# Patient Record
Sex: MALE | Race: WHITE | Employment: UNEMPLOYED | URBAN - METROPOLITAN AREA
[De-identification: names, ages, dates, MRNs, and addresses within clinical notes are randomized per-mention and may not be internally consistent; named-entity substitution may affect disease eponyms.]

---

## 2017-05-31 ENCOUNTER — OFFICE VISIT (OUTPATIENT)
Dept: FAMILY MEDICINE | Facility: CLINIC | Age: 22
End: 2017-05-31
Payer: COMMERCIAL

## 2017-05-31 VITALS
HEART RATE: 82 BPM | TEMPERATURE: 97.9 F | RESPIRATION RATE: 14 BRPM | SYSTOLIC BLOOD PRESSURE: 118 MMHG | HEIGHT: 71 IN | OXYGEN SATURATION: 99 % | DIASTOLIC BLOOD PRESSURE: 76 MMHG | WEIGHT: 208 LBS | BODY MASS INDEX: 29.12 KG/M2

## 2017-05-31 DIAGNOSIS — R53.83 OTHER FATIGUE: Primary | ICD-10-CM

## 2017-05-31 DIAGNOSIS — F41.9 ANXIETY: ICD-10-CM

## 2017-05-31 DIAGNOSIS — L70.8 OTHER ACNE: ICD-10-CM

## 2017-05-31 LAB
BASOPHILS # BLD AUTO: 0 10E9/L (ref 0–0.2)
BASOPHILS NFR BLD AUTO: 0.5 %
DIFFERENTIAL METHOD BLD: NORMAL
EOSINOPHIL # BLD AUTO: 0.2 10E9/L (ref 0–0.7)
EOSINOPHIL NFR BLD AUTO: 2.6 %
ERYTHROCYTE [DISTWIDTH] IN BLOOD BY AUTOMATED COUNT: 13.2 % (ref 10–15)
HCT VFR BLD AUTO: 45.8 % (ref 40–53)
HETEROPH AB SER QL: NEGATIVE
HGB BLD-MCNC: 15.8 G/DL (ref 13.3–17.7)
LYMPHOCYTES # BLD AUTO: 2.2 10E9/L (ref 0.8–5.3)
LYMPHOCYTES NFR BLD AUTO: 28.5 %
MCH RBC QN AUTO: 30.5 PG (ref 26.5–33)
MCHC RBC AUTO-ENTMCNC: 34.5 G/DL (ref 31.5–36.5)
MCV RBC AUTO: 88 FL (ref 78–100)
MONOCYTES # BLD AUTO: 0.7 10E9/L (ref 0–1.3)
MONOCYTES NFR BLD AUTO: 8.7 %
NEUTROPHILS # BLD AUTO: 4.7 10E9/L (ref 1.6–8.3)
NEUTROPHILS NFR BLD AUTO: 59.7 %
PLATELET # BLD AUTO: 232 10E9/L (ref 150–450)
RBC # BLD AUTO: 5.18 10E12/L (ref 4.4–5.9)
WBC # BLD AUTO: 7.8 10E9/L (ref 4–11)

## 2017-05-31 PROCEDURE — 86308 HETEROPHILE ANTIBODY SCREEN: CPT | Performed by: NURSE PRACTITIONER

## 2017-05-31 PROCEDURE — 80050 GENERAL HEALTH PANEL: CPT | Performed by: NURSE PRACTITIONER

## 2017-05-31 PROCEDURE — 99214 OFFICE O/P EST MOD 30 MIN: CPT | Performed by: NURSE PRACTITIONER

## 2017-05-31 PROCEDURE — 82306 VITAMIN D 25 HYDROXY: CPT | Performed by: NURSE PRACTITIONER

## 2017-05-31 PROCEDURE — 36415 COLL VENOUS BLD VENIPUNCTURE: CPT | Performed by: NURSE PRACTITIONER

## 2017-05-31 RX ORDER — BENZOYL PEROXIDE 10 G/100G
GEL TOPICAL AT BEDTIME
Qty: 56 G | Refills: 9 | Status: SHIPPED | OUTPATIENT
Start: 2017-05-31 | End: 2019-03-04

## 2017-05-31 RX ORDER — FLUOXETINE 10 MG/1
CAPSULE ORAL
Qty: 60 CAPSULE | Refills: 1 | Status: SHIPPED | OUTPATIENT
Start: 2017-05-31 | End: 2017-06-05

## 2017-05-31 ASSESSMENT — ANXIETY QUESTIONNAIRES
IF YOU CHECKED OFF ANY PROBLEMS ON THIS QUESTIONNAIRE, HOW DIFFICULT HAVE THESE PROBLEMS MADE IT FOR YOU TO DO YOUR WORK, TAKE CARE OF THINGS AT HOME, OR GET ALONG WITH OTHER PEOPLE: VERY DIFFICULT
5. BEING SO RESTLESS THAT IT IS HARD TO SIT STILL: MORE THAN HALF THE DAYS
3. WORRYING TOO MUCH ABOUT DIFFERENT THINGS: MORE THAN HALF THE DAYS
7. FEELING AFRAID AS IF SOMETHING AWFUL MIGHT HAPPEN: NOT AT ALL
GAD7 TOTAL SCORE: 11
2. NOT BEING ABLE TO STOP OR CONTROL WORRYING: SEVERAL DAYS
1. FEELING NERVOUS, ANXIOUS, OR ON EDGE: SEVERAL DAYS
6. BECOMING EASILY ANNOYED OR IRRITABLE: NEARLY EVERY DAY

## 2017-05-31 ASSESSMENT — PATIENT HEALTH QUESTIONNAIRE - PHQ9: 5. POOR APPETITE OR OVEREATING: MORE THAN HALF THE DAYS

## 2017-05-31 NOTE — MR AVS SNAPSHOT
"              After Visit Summary   2017    Ren Lawton    MRN: [id number]           Patient Information     Date Of Birth          1995        Visit Information        Provider Department      2017 11:00 AM Haase, Susan Rachele, APRN CNP [facility]        Today's Diagnoses     Anxiety    -  1    Other fatigue           Follow-ups after your visit        Follow-up notes from your care team     Return in about 4 weeks (around 2017).      Who to contact     If you have questions or need follow up information about today's clinic visit or your schedule please contact [facility] directly at [phone].  Normal or non-critical lab and imaging results will be communicated to you by MyChart, letter or phone within 4 business days after the clinic has received the results. If you do not hear from us within 7 days, please contact the clinic through MyChart or phone. If you have a critical or abnormal lab result, we will notify you by phone as soon as possible.  Submit refill requests through [employer] or call your pharmacy and they will forward the refill request to us. Please allow 3 business days for your refill to be completed.          Additional Information About Your Visit        MyChart Information     [employer] lets you send messages to your doctor, view your test results, renew your prescriptions, schedule appointments and more. To sign up, go to [email or web address] . Click on \"Log in\" on the left side of the screen, which will take you to the Welcome page. Then click on \"Sign up Now\" on the right side of the page.     You will be asked to enter the access code listed below, as well as some personal information. Please follow the directions to create your username and password.     Your access code is: [email or web address]  Expires: 2017 11:34 AM     Your access code will  in 90 days. If you need help or a new code, please call your [facility] " "or [phone].        Care EveryWhere ID     This is your Care EveryWhere ID. This could be used by other organizations to access your [place] medical records  [id number]        Your Vitals Were     Pulse Temperature Respirations Height Pulse Oximetry BMI (Body Mass Index)    82 97.9  F (36.6  C) (Oral) 14 5' 11\" (1.803 m) 99% 29.01 kg/m2       Blood Pressure from Last 3 Encounters:   05/31/17 118/76   04/03/16 (!) 153/92   12/10/15 127/80    Weight from Last 3 Encounters:   05/31/17 208 lb (94.3 kg) (95 %)*   04/03/16 230 lb (104.3 kg) (98 %)*   12/07/15 226 lb (102.5 kg) (98 %)*     * Growth percentiles are based on [facility] 2-20 Years data.              We Performed the Following     CBC with platelets differential     Comprehensive metabolic panel     Mononucleosis screen     TSH with free T4 reflex     Vitamin D Deficiency          Today's Medication Changes          These changes are accurate as of: 5/31/17 11:34 AM.  If you have any questions, ask your nurse or doctor.               Start taking these medicines.        Dose/Directions    FLUoxetine 10 MG capsule   Commonly known as:  PROzac   Used for:  Anxiety   Started by:  Haase, Susan Rachele, APRN CNP        Take 1 tablet every day for 2 weeks, after 2 weeks increase to 20 mg.   Quantity:  60 capsule   Refills:  1            Where to get your medicines      These medications were sent to [facility] [address] [place] [address] [facility]  [address] [place] [address]     Phone:  [phone]     FLUoxetine 10 MG capsule                Primary Care Provider Office Phone # Fax #    Susan Rachele Haase, APRN -[phone]       [facility] [address]  [facility] [address]        Thank you!     Thank you for choosing [facility]  for your care. Our goal is always to provide you with excellent care. Hearing back from our patients is one way we can continue to improve our " services. Please take a few minutes to complete the written survey that you may receive in the mail after your visit with us. Thank you!             Your Updated Medication List - Protect others around you: Learn how to safely use, store and throw away your medicines at www.disposemymeds.org.          This list is accurate as of: 5/31/17 11:34 AM.  Always use your most recent med list.                   Brand Name Dispense Instructions for use    benzoyl peroxide 10 % topical gel     56 g    Apply topically At Bedtime       clindamycin 1 % topical gel    CLINDAMAX    60 g    Apply topically 2 times daily       doxycycline 100 MG capsule    VIBRAMYCIN    60 capsule    Take 1 capsule (100 mg) by mouth 2 times daily       erythromycin ophthalmic ointment    ROMYCIN    1 Tube    Place 1 Application into the right eye 3 times daily       FLUoxetine 10 MG capsule    PROzac    60 capsule    Take 1 tablet every day for 2 weeks, after 2 weeks increase to 20 mg.

## 2017-05-31 NOTE — PROGRESS NOTES
"  SUBJECTIVE:                                                    Ren Lawton is a 21 year old male who presents to clinic today for the following health issues:    Fatigue    Duration: 7 months but worse for past 3    Description (location/character/radiation): always tired     Intensity:  severe    Accompanying signs and symptoms: pressure in head and allergies    History (similar episodes/previous evaluation): None    Precipitating or alleviating factors: None    Therapies tried and outcome: ibuprofen    Ren reports fatigue for the past several months but worsening for the past 3 months. States that he is tired all the time. Feels more \"mentally fatigued\" like he \"can't turn his brain off\" rather than physically fatigued. He is getting and average of 5-6 hours of sleep per night. Reports difficulty falling asleep and does not feel rested when he wakes up. Has associated pressure in head, sometimes feels sad but believes this is normal. Has been taking sudafed for allergies. Denies headaches, sneezing, or sore throat. Of note, patient eats about 1 meal per day at home, does not have a very good appetite. Spends most of his time working at [employer]. Not currently in school. Spending free time at home or with friends. Has a few drinks per week and smoking marijuana on the weekends but denies daily substance use.    Ren's mother reports that he has been more agitated/irritable than usual and seems down. Thinks symptoms may be exacerbated by allergies. Also going through difficult transition from graduating high school to working and putting more schooling on hold. He originally moved out but is not living at home again. Denies history of depression or anxiety, denies thoughts of harming self or others.    PHQ-9: 13  SUDHEER-7: 11   Acne:  Requests refill of benzoyl peroxide,well controlled.   Problem list and histories reviewed & adjusted, as indicated.  Additional history: as documented    Patient " "Active Problem List   Diagnosis     GERD (gastroesophageal reflux disease)     Intervertebral disc protrusion     Lumbar foraminal stenosis     Obesity     History reviewed. No pertinent surgical history.    Social History   Substance Use Topics     Smoking status: Never Smoker     Smokeless tobacco: Never Used     Alcohol use No     Family History   Problem Relation Age of Onset     DIABETES Mother      gestational diabetes     DIABETES Paternal Grandmother          Current Outpatient Prescriptions   Medication Sig Dispense Refill     doxycycline (VIBRAMYCIN) 100 MG capsule Take 1 capsule (100 mg) by mouth 2 times daily (Patient not taking: Reported on 5/31/2017) 60 capsule 3     erythromycin (ROMYCIN) ophthalmic ointment Place 1 Application into the right eye 3 times daily (Patient not taking: Reported on 5/31/2017) 1 Tube 0     clindamycin (CLINDAMAX) 1 % gel Apply topically 2 times daily (Patient not taking: Reported on 5/31/2017) 60 g 11     benzoyl peroxide 10 % gel Apply topically At Bedtime (Patient not taking: Reported on 5/31/2017) 56 g 9     Allergies   Allergen Reactions     Cats      Reviewed and updated as needed this visit by clinical staff  Reviewed and updated as needed this visit by Provider    ROS:  Constitutional, HEENT, cardiovascular, pulmonary, GI, , musculoskeletal, neuro, skin, endocrine and psych systems are negative, except as otherwise noted.    This document serves as a record of the services and decisions personally performed and made by Susan Haase, CNP. It was created on her behalf by Candis Back, a trained medical scribe. The creation of this document is based the provider's statements to the medical scribe.  Candis Back May 31, 2017 11:15 AM   OBJECTIVE:                                                    /76 (BP Location: Left arm, Patient Position: Chair, Cuff Size: Adult Large)  Pulse 82  Temp 97.9  F (36.6  C) (Oral)  Resp 14  Ht 1.803 m (5' 11\")  Wt 94.3 " kg (208 lb)  SpO2 99%  BMI 29.01 kg/m2  Body mass index is 29.01 kg/(m^2).  GENERAL: healthy, alert and no distress, overweight  HENT: ear canals and TM's normal, nose and mouth without ulcers or lesions  NECK: no adenopathy, no asymmetry, masses, or scars and thyroid normal to palpation  RESP: lungs clear to auscultation - no rales, rhonchi or wheezes  CV: regular rate and rhythm, normal S1 S2, no S3 or S4, no murmur, click or rub, no peripheral edema   ABDOMEN: soft, nontender, no hepatosplenomegaly, no masses and bowel sounds normal  NEURO: Normal strength and tone, mentation intact and speech normal  PSYCH: mentation appears normal, affect normal    Diagnostic Test Results:  none      ASSESSMENT/PLAN:                                                    Ren was seen today for fatigue.    Diagnoses and all orders for this visit:    Other fatigue: will obtain the following labs due to symptoms of fatigue,  -     CBC with platelets differential  -     Comprehensive metabolic panel  -     TSH with free T4 reflex  -     Vitamin D Deficiency  -     Mononucleosis screen    Anxiety  -     FLUoxetine (PROZAC) 10 MG capsule; Take 1 tablet every day for 2 weeks, after 2 weeks increase to 20 mg.  Discussed risks and benefits of medication, need to take on a daily basis, will take at least 2-4 weeks to notice decrease in depression or anxiety, if symptoms of depression worsen stop taking medication and notify the clinic.  Is aware of emergency resources.  Encouraged increased in exercise and counseling.   Other acne  -     benzoyl peroxide 10 % topical gel; Apply topically At Bedtime      Follow up visit in 4-6 weeks, sooner as needed.    There are no Patient Instructions on file for this visit.    The information in this document, created by the medical scribe for me, accurately reflects the services I personally performed and the decisions made by me. I have reviewed and approved this document for accuracy.   Apurva  Haase, APRN [facility]

## 2017-05-31 NOTE — LETTER
[facility]  [address] [place], [place], [id number] [phone]        June 2, 2017      Ren Lawton  [address] [facility] [address]        [place] lab results are as below:   1)  TSH (thyroid level) 0.68 which is normal (range 0.4-4)   2)  CBC (checks for anemia and infection) was normal.   3)  Glucose was normal at 88 (normal fasting is <100).   4)  Mono test was negative.   5)  Vitamin D level was normal at 28.     Results for orders placed or performed in visit on 05/31/17   CBC with platelets differential   Result Value Ref Range    WBC 7.8 4.0 - 11.0 10e9/L    RBC Count 5.18 4.4 - 5.9 10e12/L    Hemoglobin 15.8 13.3 - 17.7 g/dL    Hematocrit 45.8 40.0 - 53.0 %    MCV 88 78 - 100 fl    MCH 30.5 26.5 - 33.0 pg    MCHC 34.5 31.5 - 36.5 g/dL    RDW 13.2 10.0 - 15.0 %    Platelet Count 232 150 - 450 10e9/L    Diff Method Automated Method     % Neutrophils 59.7 %    % Lymphocytes 28.5 %    % Monocytes 8.7 %    % Eosinophils 2.6 %    % Basophils 0.5 %    Absolute Neutrophil 4.7 1.6 - 8.3 10e9/L    Absolute Lymphocytes 2.2 0.8 - 5.3 10e9/L    Absolute Monocytes 0.7 0.0 - 1.3 10e9/L    Absolute Eosinophils 0.2 0.0 - 0.7 10e9/L    Absolute Basophils 0.0 0.0 - 0.2 10e9/L   Comprehensive metabolic panel   Result Value Ref Range    Sodium 141 133 - 144 mmol/L    Potassium 4.5 3.4 - 5.3 mmol/L    Chloride 106 98 - 110 mmol/L    Carbon Dioxide 25 20 - 32 mmol/L    Anion Gap 10 3 - 14 mmol/L    Glucose 88 70 - 99 mg/dL    Urea Nitrogen 19 7 - 30 mg/dL    Creatinine 1.04 (H) 0.50 - 1.00 mg/dL    GFR Estimate >90  Non  GFR Calc   >60 mL/min/1.7m2    GFR Estimate If Black >90   GFR Calc   >60 mL/min/1.7m2    Calcium 9.4 8.5 - 10.1 mg/dL    Bilirubin Total 0.6 0.2 - 1.3 mg/dL    Albumin 4.6 3.4 - 5.0 g/dL    Protein Total 8.2 6.8 - 8.8 g/dL    Alkaline Phosphatase 92 65 - 260 U/L    ALT 57 (H) 0 - 50 U/L    AST 48 (H) 0 - 35 U/L   TSH with  free T4 reflex   Result Value Ref Range    TSH 0.68 0.40 - 4.00 mU/L   Vitamin D Deficiency   Result Value Ref Range    Vitamin D Deficiency screening 28 20 - 75 ug/L   Mononucleosis screen   Result Value Ref Range    Mononucleosis Screen Negative NEG     If you have any questions do not hesitate to call the clinic to discuss the results with me further.     Sincerely,     Susan Haase, CNP

## 2017-05-31 NOTE — NURSING NOTE
"Chief Complaint   Patient presents with     Fatigue       Initial /76 (BP Location: Left arm, Patient Position: Chair, Cuff Size: Adult Large)  Pulse 82  Temp 97.9  F (36.6  C) (Oral)  Resp 14  Ht 5' 11\" (1.803 m)  Wt 208 lb (94.3 kg)  SpO2 99%  BMI 29.01 kg/m2 Estimated body mass index is 29.01 kg/(m^2) as calculated from the following:    Height as of this encounter: 5' 11\" (1.803 m).    Weight as of this encounter: 208 lb (94.3 kg).  Medication Reconciliation: complete   Kira Kruger CMA      "

## 2017-06-01 LAB
ALBUMIN SERPL-MCNC: 4.6 G/DL (ref 3.4–5)
ALP SERPL-CCNC: 92 U/L (ref 65–260)
ALT SERPL W P-5'-P-CCNC: 57 U/L (ref 0–50)
ANION GAP SERPL CALCULATED.3IONS-SCNC: 10 MMOL/L (ref 3–14)
AST SERPL W P-5'-P-CCNC: 48 U/L (ref 0–35)
BILIRUB SERPL-MCNC: 0.6 MG/DL (ref 0.2–1.3)
BUN SERPL-MCNC: 19 MG/DL (ref 7–30)
CALCIUM SERPL-MCNC: 9.4 MG/DL (ref 8.5–10.1)
CHLORIDE SERPL-SCNC: 106 MMOL/L (ref 98–110)
CO2 SERPL-SCNC: 25 MMOL/L (ref 20–32)
CREAT SERPL-MCNC: 1.04 MG/DL (ref 0.5–1)
DEPRECATED CALCIDIOL+CALCIFEROL SERPL-MC: 28 UG/L (ref 20–75)
GFR SERPL CREATININE-BSD FRML MDRD: ABNORMAL ML/MIN/1.7M2
GLUCOSE SERPL-MCNC: 88 MG/DL (ref 70–99)
POTASSIUM SERPL-SCNC: 4.5 MMOL/L (ref 3.4–5.3)
PROT SERPL-MCNC: 8.2 G/DL (ref 6.8–8.8)
SODIUM SERPL-SCNC: 141 MMOL/L (ref 133–144)
TSH SERPL DL<=0.005 MIU/L-ACNC: 0.68 MU/L (ref 0.4–4)

## 2017-06-01 ASSESSMENT — PATIENT HEALTH QUESTIONNAIRE - PHQ9: SUM OF ALL RESPONSES TO PHQ QUESTIONS 1-9: 13

## 2017-06-01 ASSESSMENT — ANXIETY QUESTIONNAIRES: GAD7 TOTAL SCORE: 11

## 2017-06-05 ENCOUNTER — TELEPHONE (OUTPATIENT)
Dept: FAMILY MEDICINE | Facility: CLINIC | Age: 22
End: 2017-06-05

## 2017-06-05 DIAGNOSIS — F41.9 ANXIETY: ICD-10-CM

## 2017-06-05 RX ORDER — FLUOXETINE 10 MG/1
CAPSULE ORAL
Qty: 14 CAPSULE | Refills: 0 | Status: SHIPPED | OUTPATIENT
Start: 2017-06-05 | End: 2017-06-27 | Stop reason: ALTCHOICE

## 2017-06-05 NOTE — TELEPHONE ENCOUNTER
Fax from [facility], insurance over covers fluoxetine only 1 per day, resent for 2 prescriptions  Prescription approved per [facility] Refill Protocol.  Rolanda Tripp, RN, BSN

## 2017-06-27 ENCOUNTER — TELEPHONE (OUTPATIENT)
Dept: FAMILY MEDICINE | Facility: CLINIC | Age: 22
End: 2017-06-27

## 2017-06-27 ENCOUNTER — VIRTUAL VISIT (OUTPATIENT)
Dept: FAMILY MEDICINE | Facility: CLINIC | Age: 22
End: 2017-06-27
Payer: COMMERCIAL

## 2017-06-27 DIAGNOSIS — F41.9 ANXIETY: Primary | ICD-10-CM

## 2017-06-27 PROCEDURE — 98966 PH1 ASSMT&MGMT NQHP 5-10: CPT | Performed by: NURSE PRACTITIONER

## 2017-06-27 RX ORDER — BUPROPION HYDROCHLORIDE 150 MG/1
150 TABLET ORAL EVERY MORNING
Qty: 30 TABLET | Refills: 1 | Status: SHIPPED | OUTPATIENT
Start: 2017-06-27 | End: 2017-07-18

## 2017-06-27 ASSESSMENT — ANXIETY QUESTIONNAIRES
2. NOT BEING ABLE TO STOP OR CONTROL WORRYING: SEVERAL DAYS
5. BEING SO RESTLESS THAT IT IS HARD TO SIT STILL: NEARLY EVERY DAY
6. BECOMING EASILY ANNOYED OR IRRITABLE: NEARLY EVERY DAY
GAD7 TOTAL SCORE: 9
3. WORRYING TOO MUCH ABOUT DIFFERENT THINGS: SEVERAL DAYS
IF YOU CHECKED OFF ANY PROBLEMS ON THIS QUESTIONNAIRE, HOW DIFFICULT HAVE THESE PROBLEMS MADE IT FOR YOU TO DO YOUR WORK, TAKE CARE OF THINGS AT HOME, OR GET ALONG WITH OTHER PEOPLE: VERY DIFFICULT
7. FEELING AFRAID AS IF SOMETHING AWFUL MIGHT HAPPEN: NOT AT ALL
1. FEELING NERVOUS, ANXIOUS, OR ON EDGE: SEVERAL DAYS

## 2017-06-27 ASSESSMENT — PATIENT HEALTH QUESTIONNAIRE - PHQ9: 5. POOR APPETITE OR OVEREATING: NOT AT ALL

## 2017-06-27 NOTE — MR AVS SNAPSHOT
After Visit Summary   6/27/2017    Ren Lawton    MRN: [id number]           Patient Information     Date Of Birth          1995        Visit Information        Provider Department      6/27/2017 11:15 AM Haase, Susan Rachele, APRN CNP [facility]        Today's Diagnoses     Anxiety    -  1       Follow-ups after your visit        Additional Services     MENTAL HEALTH REFERRAL       Your provider has referred you to: [facility] [phone] for ADD testing    All scheduling is subject to the client's specific insurance plan & benefits, provider/location availability, and provider clinical specialities.  Please arrive 15 minutes early for your first appointment and bring your completed paperwork.    Please be aware that coverage of these services is subject to the terms and limitations of your health insurance plan.  Call member services at your health plan with any benefit or coverage questions.                  Follow-up notes from your care team     Return in about 2 months (around 8/27/2017).      Who to contact     If you have questions or need follow up information about today's clinic visit or your schedule please contact [facility] directly at [phone].  Normal or non-critical lab and imaging results will be communicated to you by [employer], letter or phone within 4 business days after the clinic has received the results. If you do not hear from us within 7 days, please contact the clinic through [employer] or phone. If you have a critical or abnormal lab result, we will notify you by phone as soon as possible.  Submit refill requests through [employer] or call your pharmacy and they will forward the refill request to us. Please allow 3 business days for your refill to be completed.          Additional Information About Your Visit        [employer] Information     [employer] lets you send messages to your doctor, view your test results,  "renew your prescriptions, schedule appointments and more. To sign up, go to [email or web address] . Click on \"Log in\" on the left side of the screen, which will take you to the Welcome page. Then click on \"Sign up Now\" on the right side of the page.     You will be asked to enter the access code listed below, as well as some personal information. Please follow the directions to create your username and password.     Your access code is: [email or web address]  Expires: 2017 11:34 AM     Your access code will  in 90 days. If you need help or a new code, please call your [place] clinic or [phone].        Care EveryWhere ID     This is your Care EveryWhere ID. This could be used by other organizations to access your [place] medical records  [id number]         Blood Pressure from Last 3 Encounters:   17 118/76   16 (!) 153/92   12/10/15 127/80    Weight from Last 3 Encounters:   17 208 lb (94.3 kg) (95 %)*   16 230 lb (104.3 kg) (98 %)*   12/07/15 226 lb (102.5 kg) (98 %)*     * Growth percentiles are based on [facility] 2-20 Years data.              We Performed the Following     MENTAL HEALTH REFERRAL          Today's Medication Changes          These changes are accurate as of: 17 12:33 PM.  If you have any questions, ask your nurse or doctor.               Start taking these medicines.        Dose/Directions    buPROPion 150 MG 24 hr tablet   Commonly known as:  WELLBUTRIN XL   Used for:  Anxiety   Started by:  Haase, Susan Rachele, APRN CNP        Dose:  150 mg   Take 1 tablet (150 mg) by mouth every morning   Quantity:  30 tablet   Refills:  1         Stop taking these medicines if you haven't already. Please contact your care team if you have questions.     FLUoxetine 10 MG capsule   Commonly known as:  PROzac   Stopped by:  Haase, Susan Rachele, APRN CNP           FLUoxetine 20 MG capsule   Commonly known as:  PROzac   Stopped by:  Haase, Susan Rachele, APRN CNP              "   Where to get your medicines      These medications were sent to [facility] - [place], [place] - [address] [facility]  [address] [facility] [address]     Phone:  [phone]     buPROPion 150 MG 24 hr tablet                Primary Care Provider Office Phone # Fax #    Susan Rachele Haase, APRN -[phone]       [facility] [address]  [facility] [address]        Equal Access to Services     KLARISSA HALE : Hadii aad ku hadasho Soomaali, waaxda luqadaha, qaybta kaalmada adeegyada, waxay idiin hayaan adeeg kharash lamarlyn . So [facility] [phone].    ATENCIÓN: Si habla español, tiene a foley disposición servicios gratuitos de asistencia lingüística. [facility] [phone].    We comply with applicable federal civil rights laws and [place] laws. We do not discriminate on the basis of race, color, national origin, age, disability sex, sexual orientation or gender identity.            Thank you!     Thank you for choosing [facility]  for your care. Our goal is always to provide you with excellent care. Hearing back from our patients is one way we can continue to improve our services. Please take a few minutes to complete the written survey that you may receive in the mail after your visit with us. Thank you!             Your Updated Medication List - Protect others around you: Learn how to safely use, store and throw away your medicines at www.disposemymeds.org.          This list is accurate as of: 6/27/17 12:33 PM.  Always use your most recent med list.                   Brand Name Dispense Instructions for use Diagnosis    benzoyl peroxide 10 % topical gel     56 g    Apply topically At Bedtime    Other acne       buPROPion 150 MG 24 hr tablet    WELLBUTRIN XL    30 tablet    Take 1 tablet (150 mg) by mouth every morning    Anxiety       clindamycin 1 % topical gel    CLINDAMAX    60 g    Apply topically 2 times daily    Other acne        doxycycline 100 MG capsule    VIBRAMYCIN    60 capsule    Take 1 capsule (100 mg) by mouth 2 times daily    Other acne       erythromycin ophthalmic ointment    ROMYCIN    1 Tube    Place 1 Application into the right eye 3 times daily

## 2017-06-27 NOTE — TELEPHONE ENCOUNTER
Mom calls for patient. CTC on file.   Pt is confused whether he is to discontinue Prozac without a taper?  Is he to start Wellbutrin?  Or hold all meds until eval for ADD?  He recalled Apurva told him not to  Wellbutrin but mom just received call from pharmacy Rx is ready.    Ramior Brooks RN

## 2017-06-27 NOTE — PROGRESS NOTES
SUBJECTIVE:                                                    Ren Lawton is a 21 year old male who presents to clinic today for the following health issues:    Anxiety Follow-Up    Status since last visit: No change    Other associated symptoms:None    Complicating factors:   Significant life event: No   Current substance abuse: None  Depression symptoms: No  SUDHEER-7 SCORE 5/31/2017 6/27/2017   Total Score 11 9       GAD7            Amount of exercise or physical activity: 3-4 days/week for an average of greater than 60 minutes    Problems taking medications regularly: No    Medication side effects: none    Diet: regular (no restrictions)  Has been taking prozac 20 mg for the past 2 months, has not had any change in depression or anxiety.  Is concerned that he may have ADD since anxious with difficulty concentrating. Has had this issues since childhood, has never been treated for ADD.    Problem list and histories reviewed & adjusted, as indicated.  Additional history: as documented    Patient Active Problem List   Diagnosis     GERD (gastroesophageal reflux disease)     Intervertebral disc protrusion     Lumbar foraminal stenosis     Obesity     Anxiety     History reviewed. No pertinent surgical history.    Social History   Substance Use Topics     Smoking status: Never Smoker     Smokeless tobacco: Never Used     Alcohol use No     Family History   Problem Relation Age of Onset     DIABETES Mother      gestational diabetes     DIABETES Paternal Grandmother          Current Outpatient Prescriptions   Medication Sig Dispense Refill     FLUoxetine (PROZAC) 20 MG capsule Take 1 capsule (20 mg) by mouth daily 30 capsule 1     benzoyl peroxide 10 % topical gel Apply topically At Bedtime 56 g 9     FLUoxetine (PROZAC) 10 MG capsule Take 1 tablet every day for 2 weeks, than change to 20 mg rx (Patient not taking: Reported on 6/27/2017) 14 capsule 0     doxycycline (VIBRAMYCIN) 100 MG capsule Take 1 capsule (100  mg) by mouth 2 times daily (Patient not taking: Reported on 5/31/2017) 60 capsule 3     erythromycin (ROMYCIN) ophthalmic ointment Place 1 Application into the right eye 3 times daily (Patient not taking: Reported on 5/31/2017) 1 Tube 0     clindamycin (CLINDAMAX) 1 % gel Apply topically 2 times daily (Patient not taking: Reported on 5/31/2017) 60 g 11     Allergies   Allergen Reactions     Cats        Reviewed and updated as needed this visit by clinical staff  Tobacco  Allergies  Med Hx  Surg Hx  Fam Hx  Soc Hx      Reviewed and updated as needed this visit by Provider         ROS:  C: NEGATIVE for fever, chills, change in weight  R: NEGATIVE for significant cough or SOB  CV: NEGATIVE for chest pain, palpitations or peripheral edema  PSYCHIATRIC: NEGATIVE for changes in mood or affect    OBJECTIVE:      Phone visit    ASSESSMENT:       PLAN:   Ren was seen today for recheck medication.    Diagnoses and all orders for this visit:    Anxiety  -     buPROPion (WELLBUTRIN XL) 150 MG 24 hr tablet; Take 1 tablet (150 mg) by mouth every morning  -     MENTAL HEALTH REFERRAL    Discussed evaluation for ADD with [facility], after testing please bring a copy of the assessment for my evaluation, if indicated medication will be prescribed.  Follow up in 2-3 months, sooner as needed.  Phone visit lasted 10 minutes.  Susan Haase, APRN [facility]

## 2017-06-28 ASSESSMENT — PATIENT HEALTH QUESTIONNAIRE - PHQ9: SUM OF ALL RESPONSES TO PHQ QUESTIONS 1-9: 13

## 2017-06-28 ASSESSMENT — ANXIETY QUESTIONNAIRES: GAD7 TOTAL SCORE: 9

## 2017-06-28 NOTE — TELEPHONE ENCOUNTER
Spoke with Magy (mother), discussed the he may discontinue prozac at this time. Refer to [facility] for ADD assessment.  Has an appt for next week.  Instructed to only start wellbutrin if not able to get in for evaluation.  Susan Haase, CNP

## 2017-07-17 ENCOUNTER — TELEPHONE (OUTPATIENT)
Dept: FAMILY MEDICINE | Facility: CLINIC | Age: 22
End: 2017-07-17

## 2017-07-17 DIAGNOSIS — F41.9 ANXIETY: ICD-10-CM

## 2017-07-17 NOTE — TELEPHONE ENCOUNTER
Mom calls, CTC on file, calls with update to , ok for 7/18, pt canceled psych appointment due to cost, does not have insurance, see recent notes, pt has Improved energy but  Focus still an issue, wonders about next step, wants  input, routed, inform mom of plan  Rolanda Tripp RN, BSN  Message handled by Nurse Triage.

## 2017-07-18 RX ORDER — BUPROPION HYDROCHLORIDE 300 MG/1
300 TABLET ORAL EVERY MORNING
Qty: 30 TABLET | Refills: 1 | Status: SHIPPED | OUTPATIENT
Start: 2017-07-18 | End: 2017-08-08 | Stop reason: DRUGHIGH

## 2017-07-18 NOTE — TELEPHONE ENCOUNTER
I would suggest an office visit or phone visit with both mom and delilah on the line, at that time we can discuss possibility of starting on Wellbutrin.  Thanks,  Susan Haase, CNP

## 2017-07-18 NOTE — TELEPHONE ENCOUNTER
Called mom, pt started on wellbutrin  mg on  6/27, prozac was discontinued, did have increased energy but focus still an issue, wonders if can increase dose? SH please confirm plan and if you still need phone visit, route again for processing  Rolanda Tripp RN, BSN  Message handled by Nurse Triage.

## 2017-07-19 NOTE — TELEPHONE ENCOUNTER
Called mom, informed, agrees, f/u appointment made  Rolanda Tripp RN, BSN  Message handled by Nurse Triage.

## 2017-07-19 NOTE — TELEPHONE ENCOUNTER
I would suggest increasing to wellbutrin  mg, a new prescription has liza sent. I will need to see him for follow up in 1 month.  Thanks,  Susan Haase, CNP

## 2017-08-08 ENCOUNTER — OFFICE VISIT (OUTPATIENT)
Dept: FAMILY MEDICINE | Facility: CLINIC | Age: 22
End: 2017-08-08
Payer: COMMERCIAL

## 2017-08-08 VITALS
OXYGEN SATURATION: 100 % | HEART RATE: 68 BPM | SYSTOLIC BLOOD PRESSURE: 152 MMHG | WEIGHT: 215 LBS | DIASTOLIC BLOOD PRESSURE: 80 MMHG | BODY MASS INDEX: 29.99 KG/M2 | RESPIRATION RATE: 14 BRPM | TEMPERATURE: 98.1 F

## 2017-08-08 DIAGNOSIS — F41.9 ANXIETY: Primary | ICD-10-CM

## 2017-08-08 PROCEDURE — 99213 OFFICE O/P EST LOW 20 MIN: CPT | Performed by: NURSE PRACTITIONER

## 2017-08-08 RX ORDER — BUPROPION HYDROCHLORIDE 150 MG/1
150 TABLET ORAL EVERY MORNING
Qty: 30 TABLET | Refills: 1 | Status: SHIPPED | OUTPATIENT
Start: 2017-08-08 | End: 2017-09-08

## 2017-08-08 ASSESSMENT — ANXIETY QUESTIONNAIRES
2. NOT BEING ABLE TO STOP OR CONTROL WORRYING: SEVERAL DAYS
GAD7 TOTAL SCORE: 10
3. WORRYING TOO MUCH ABOUT DIFFERENT THINGS: NEARLY EVERY DAY
1. FEELING NERVOUS, ANXIOUS, OR ON EDGE: MORE THAN HALF THE DAYS
6. BECOMING EASILY ANNOYED OR IRRITABLE: MORE THAN HALF THE DAYS
5. BEING SO RESTLESS THAT IT IS HARD TO SIT STILL: SEVERAL DAYS
7. FEELING AFRAID AS IF SOMETHING AWFUL MIGHT HAPPEN: NOT AT ALL

## 2017-08-08 ASSESSMENT — PATIENT HEALTH QUESTIONNAIRE - PHQ9
SUM OF ALL RESPONSES TO PHQ QUESTIONS 1-9: 13
5. POOR APPETITE OR OVEREATING: SEVERAL DAYS

## 2017-08-08 NOTE — PATIENT INSTRUCTIONS
Follow up with Neuropsychology regarding ADD testing, as discussed    Consider [facility] genetic testing to find potential effective medications    Continue with Wellbutrin for 2-4 weeks - if not improvement, decrease to 150 mg daily at next refill

## 2017-08-08 NOTE — PROGRESS NOTES
SUBJECTIVE:                                                    Ren Lawton is a 21 year old male who presents to clinic today with his mother for the following health issues:    Anxiety Follow-Up    Status since last visit: Worsened     Other associated symptoms:None    Complicating factors:   Significant life event: No   Current substance abuse: None  Depression symptoms: No  SUDHEER-7 SCORE 5/31/2017 6/27/2017   Total Score 11 9     Wellbutrin  mg daily. He reported feeling worse, and that he is having significant trouble focusing. He has tried other medications, which also haven't helped.     He reported intermittent nights where he wakes up lots of times - 3-4 days/week. Average of 5-9 hours of sleep and fatigued during the day.     He reported days of overeating and days of under-eating.     PHQ-9: 13, denies thoughts of harming self or others.  SUDHEER-7: 10    Amount of exercise or physical activity: 2-3 days/week for an average of 30-45 minutes    Problems taking medications regularly: No    Medication side effects: none    Diet: regular (no restrictions)    Problem list and histories reviewed & adjusted, as indicated.  Additional history: as documented    Reviewed and updated as needed this visit by clinical staffTobacco  Allergies  Med Hx  Surg Hx  Fam Hx  Soc Hx      Reviewed and updated as needed this visit by Provider         ROS:  Constitutional, HEENT, cardiovascular, pulmonary, GI, , musculoskeletal, neuro, skin, endocrine and psych systems are negative, except as otherwise noted.    This document serves as a record of the services and decisions personally performed and made by Susan Haase, CNP. It was created on her behalf by Gildardo Valle, a trained medical scribe. The creation of this document is based the provider's statements to the medical scribe.  Gildardo Valle August 8, 2017 11:55 AM    OBJECTIVE:   /80 (BP Location: Left arm, Patient Position: Chair, Cuff Size: Adult Large)   Pulse 68  Temp 98.1  F (36.7  C) (Oral)  Resp 14  Wt 97.5 kg (215 lb)  SpO2 100%  BMI 29.99 kg/m2  Body mass index is 29.99 kg/(m^2).  GENERAL: healthy, alert and no distress, overweight  NEURO: mentation intact and speech normal  PSYCH: mentation appears normal, affect flat/agitated, tearful    Diagnostic Test Results:  Results for orders placed or performed in visit on 05/31/17   CBC with platelets differential   Result Value Ref Range    WBC 7.8 4.0 - 11.0 10e9/L    RBC Count 5.18 4.4 - 5.9 10e12/L    Hemoglobin 15.8 13.3 - 17.7 g/dL    Hematocrit 45.8 40.0 - 53.0 %    MCV 88 78 - 100 fl    MCH 30.5 26.5 - 33.0 pg    MCHC 34.5 31.5 - 36.5 g/dL    RDW 13.2 10.0 - 15.0 %    Platelet Count 232 150 - 450 10e9/L    Diff Method Automated Method     % Neutrophils 59.7 %    % Lymphocytes 28.5 %    % Monocytes 8.7 %    % Eosinophils 2.6 %    % Basophils 0.5 %    Absolute Neutrophil 4.7 1.6 - 8.3 10e9/L    Absolute Lymphocytes 2.2 0.8 - 5.3 10e9/L    Absolute Monocytes 0.7 0.0 - 1.3 10e9/L    Absolute Eosinophils 0.2 0.0 - 0.7 10e9/L    Absolute Basophils 0.0 0.0 - 0.2 10e9/L   Comprehensive metabolic panel   Result Value Ref Range    Sodium 141 133 - 144 mmol/L    Potassium 4.5 3.4 - 5.3 mmol/L    Chloride 106 98 - 110 mmol/L    Carbon Dioxide 25 20 - 32 mmol/L    Anion Gap 10 3 - 14 mmol/L    Glucose 88 70 - 99 mg/dL    Urea Nitrogen 19 7 - 30 mg/dL    Creatinine 1.04 (H) 0.50 - 1.00 mg/dL    GFR Estimate >90  Non  GFR Calc   >60 mL/min/1.7m2    GFR Estimate If Black >90   GFR Calc   >60 mL/min/1.7m2    Calcium 9.4 8.5 - 10.1 mg/dL    Bilirubin Total 0.6 0.2 - 1.3 mg/dL    Albumin 4.6 3.4 - 5.0 g/dL    Protein Total 8.2 6.8 - 8.8 g/dL    Alkaline Phosphatase 92 65 - 260 U/L    ALT 57 (H) 0 - 50 U/L    AST 48 (H) 0 - 35 U/L   TSH with free T4 reflex   Result Value Ref Range    TSH 0.68 0.40 - 4.00 mU/L   Vitamin D Deficiency   Result Value Ref Range    Vitamin D Deficiency screening 28  20 - 75 ug/L   Mononucleosis screen   Result Value Ref Range    Mononucleosis Screen Negative NEG     ASSESSMENT/PLAN:   Ren was seen today for recheck medication.    Diagnoses and all orders for this visit:    Anxiety  -     buPROPion (WELLBUTRIN XL) 150 MG 24 hr tablet; Take 1 tablet (150 mg) by mouth every morning  Encouraged further assess ment for ADD since focus seems to be the main issue, once he has received test results instructed to bring a copy to the clinic and to set up a phone visit to discuss medication options if recommended.     Patient Instructions   Follow up with Neuropsychology regarding ADD testing, as discussed    Consider [facility] genetic testing to find potential effective medications    Continue with Wellbutrin for 2-4 weeks - if not improvement, decrease to 150 mg daily at next refill    The information in this document, created by the medical scribe for me, accurately reflects the services I personally performed and the decisions made by me. I have reviewed and approved this document for accuracy.   Susan Haase, CNP Susan Haase, APRN CNP  [facility]

## 2017-08-08 NOTE — NURSING NOTE
"Chief Complaint   Patient presents with     Recheck Medication       Initial /80 (BP Location: Left arm, Patient Position: Chair, Cuff Size: Adult Large)  Pulse 68  Temp 98.1  F (36.7  C) (Oral)  Resp 14  Wt 215 lb (97.5 kg)  SpO2 100%  BMI 29.99 kg/m2 Estimated body mass index is 29.99 kg/(m^2) as calculated from the following:    Height as of 5/31/17: 5' 11\" (1.803 m).    Weight as of this encounter: 215 lb (97.5 kg).  Medication Reconciliation: complete   Kira Kruger CMA      "

## 2017-08-08 NOTE — MR AVS SNAPSHOT
"              After Visit Summary   8/8/2017    Ren Lawton    MRN: [id number]           Patient Information     Date Of Birth          1995        Visit Information        Provider Department      8/8/2017 11:45 AM Haase, Susan Rachele, APRN CNP [facility]        Today's Diagnoses     Anxiety    -  1      Care Instructions    Follow up with Neuropsychology regarding ADD testing, as discussed    Consider [facility] genetic testing to find potential effective medications    Continue with Wellbutrin for 2-4 weeks - if not improvement, decrease to 150 mg daily at next refill          Follow-ups after your visit        Who to contact     If you have questions or need follow up information about today's clinic visit or your schedule please contact [facility] directly at [phone].  Normal or non-critical lab and imaging results will be communicated to you by MyChart, letter or phone within 4 business days after the clinic has received the results. If you do not hear from us within 7 days, please contact the clinic through MyChart or phone. If you have a critical or abnormal lab result, we will notify you by phone as soon as possible.  Submit refill requests through [employer] or call your pharmacy and they will forward the refill request to us. Please allow 3 business days for your refill to be completed.          Additional Information About Your Visit        MyChart Information     [employer] lets you send messages to your doctor, view your test results, renew your prescriptions, schedule appointments and more. To sign up, go to [email or web address] . Click on \"Log in\" on the left side of the screen, which will take you to the Welcome page. Then click on \"Sign up Now\" on the right side of the page.     You will be asked to enter the access code listed below, as well as some personal information. Please follow the directions to create your username and password.     Your " access code is: [email or web address]  Expires: 2017 11:34 AM     Your access code will  in 90 days. If you need help or a new code, please call your [place] clinic or [phone].        Care EveryWhere ID     This is your Care EveryWhere ID. This could be used by other organizations to access your [place] medical records  [id number]        Your Vitals Were     Pulse Temperature Respirations Pulse Oximetry BMI (Body Mass Index)       68 98.1  F (36.7  C) (Oral) 14 100% 29.99 kg/m2        Blood Pressure from Last 3 Encounters:   17 152/80   17 118/76   16 (!) 153/92    Weight from Last 3 Encounters:   17 215 lb (97.5 kg) (96 %)*   17 208 lb (94.3 kg) (95 %)*   16 230 lb (104.3 kg) (98 %)*     * Growth percentiles are based on [facility] 2-20 Years data.              Today, you had the following     No orders found for display         Today's Medication Changes          These changes are accurate as of: 17 12:26 PM.  If you have any questions, ask your nurse or doctor.               These medicines have changed or have updated prescriptions.        Dose/Directions    * buPROPion 300 MG 24 hr tablet   Commonly known as:  WELLBUTRIN XL   This may have changed:  Another medication with the same name was added. Make sure you understand how and when to take each.   Used for:  Anxiety   Changed by:  Haase, Susan Rachele, APRN CNP        Dose:  300 mg   Take 1 tablet (300 mg) by mouth every morning   Quantity:  30 tablet   Refills:  1       * buPROPion 150 MG 24 hr tablet   Commonly known as:  WELLBUTRIN XL   This may have changed:  You were already taking a medication with the same name, and this prescription was added. Make sure you understand how and when to take each.   Used for:  Anxiety   Changed by:  Haase, Susan Rachele, APRN CNP        Dose:  150 mg   Take 1 tablet (150 mg) by mouth every morning   Quantity:  30 tablet   Refills:  1       * Notice:  This list has 2  medication(s) that are the same as other medications prescribed for you. Read the directions carefully, and ask your doctor or other care provider to review them with you.         Where to get your medicines      These medications were sent to [facility] [address] [address] [facility]  [address] [facility] [address]     Phone:  [phone]     buPROPion 150 MG 24 hr tablet                Primary Care Provider Office Phone # Fax #    Susan Rachele Haase, APRN -[phone]       [facility] [address]  [facility] [address]        Equal Access to Services     [facility]ino[facility]RI[facility] : Hadii aad ku hadasho Soomaali, waaxda luqadaha, qaybta kaalmada adeegyada, puma rosales hayjay gao . So [facility] [phone].    ATENCIÓN: Si habla español, tiene a foley disposición servicios gratuitos de asistencia lingüística. Llame al [phone].    We comply with applicable federal civil rights laws and [place] laws. We do not discriminate on the basis of race, color, national origin, age, disability sex, sexual orientation or gender identity.            Thank you!     Thank you for choosing [facility]  for your care. Our goal is always to provide you with excellent care. Hearing back from our patients is one way we can continue to improve our services. Please take a few minutes to complete the written survey that you may receive in the mail after your visit with us. Thank you!             Your Updated Medication List - Protect others around you: Learn how to safely use, store and throw away your medicines at www.disposemymeds.org.          This list is accurate as of: 8/8/17 12:26 PM.  Always use your most recent med list.                   Brand Name Dispense Instructions for use Diagnosis    benzoyl peroxide 10 % topical gel     56 g    Apply topically At Bedtime    Other acne       * buPROPion 300 MG 24 hr tablet    WELLBUTRIN  XL    30 tablet    Take 1 tablet (300 mg) by mouth every morning    Anxiety       * buPROPion 150 MG 24 hr tablet    WELLBUTRIN XL    30 tablet    Take 1 tablet (150 mg) by mouth every morning    Anxiety       clindamycin 1 % topical gel    CLINDAMAX    60 g    Apply topically 2 times daily    Other acne       doxycycline 100 MG capsule    VIBRAMYCIN    60 capsule    Take 1 capsule (100 mg) by mouth 2 times daily    Other acne       erythromycin ophthalmic ointment    ROMYCIN    1 Tube    Place 1 Application into the right eye 3 times daily        * Notice:  This list has 2 medication(s) that are the same as other medications prescribed for you. Read the directions carefully, and ask your doctor or other care provider to review them with you.

## 2017-08-09 ASSESSMENT — ANXIETY QUESTIONNAIRES: GAD7 TOTAL SCORE: 10

## 2017-08-22 ENCOUNTER — TRANSFERRED RECORDS (OUTPATIENT)
Dept: HEALTH INFORMATION MANAGEMENT | Facility: CLINIC | Age: 22
End: 2017-08-22

## 2017-09-08 ENCOUNTER — VIRTUAL VISIT (OUTPATIENT)
Dept: FAMILY MEDICINE | Facility: CLINIC | Age: 22
End: 2017-09-08
Payer: COMMERCIAL

## 2017-09-08 DIAGNOSIS — F90.9 ATTENTION DEFICIT HYPERACTIVITY DISORDER (ADHD), UNSPECIFIED ADHD TYPE: Primary | ICD-10-CM

## 2017-09-08 PROCEDURE — 98966 PH1 ASSMT&MGMT NQHP 5-10: CPT | Performed by: NURSE PRACTITIONER

## 2017-09-08 RX ORDER — DEXTROAMPHETAMINE SACCHARATE, AMPHETAMINE ASPARTATE MONOHYDRATE, DEXTROAMPHETAMINE SULFATE AND AMPHETAMINE SULFATE 5; 5; 5; 5 MG/1; MG/1; MG/1; MG/1
20 CAPSULE, EXTENDED RELEASE ORAL DAILY
Qty: 30 CAPSULE | Refills: 0 | Status: SHIPPED | OUTPATIENT
Start: 2017-09-08 | End: 2017-10-09

## 2017-09-08 NOTE — PROGRESS NOTES
SUBJECTIVE:                                                    Ren Lawton is a 21 year old male who presents to clinic today     Chief Complaint   Patient presents with     Medication Request        HPI:  ADHD Initial    Major concerns: discuss results from [facility].  Had evaluation completed and diagnosed with ADHD, predominately attention deficit.    Is currently taking Wellbutrin without any change in symptoms.    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     Symptom Checklist:  Inattentiveness: often failing to give attention to detail or making careless error(s), often having trouble sustaining attention, often not seeming to listen when spoken to directly, often not following through on instructions, school work, or chores, often having difficulty with organizing tasks and activities, often avoiding tasks that require sustained mental effort, often losing things, often easily distracted and often forgetful in daily activities.  Hyperactivity: no symptoms.  Impulsivity: no symptoms.     Co-Morbid Diagnosis: Depression  Currently in counseling: No    New diagnosis:  Testing completed at [facility]    Family Cardiac history reviewed and is negative.      ROS:  GENERAL: No fever, weight change, fatigue  SKIN: No rash, hives, or significant lesions  HEENT: Hearing/vision: No Eye redness/discharge, nasal congestion, sneezing, snoring  RESP: No cough, wheezing, SOB  CV: No cyanosis, palpitations, syncope, chest pain  GI: No constipation, diarrhea, abdominal pain  Neuro: No headaches, tics, migraines, tremor  PSYCH: No history of depression or ODD, suicide attempts, cutting    PROBLEM LIST:Patient Active Problem List    Diagnosis Date Noted     Anxiety 05/31/2017     Priority: Medium     Intervertebral disc protrusion 06/08/2015     Priority: Medium     Lumbar foraminal stenosis 06/08/2015     Priority: Medium     Obesity 06/08/2015      Priority: Medium     GERD (gastroesophageal reflux disease) 04/21/2011     Priority: Medium      MEDICATIONS:  Current Outpatient Prescriptions   Medication Sig Dispense Refill     buPROPion (WELLBUTRIN XL) 150 MG 24 hr tablet Take 1 tablet (150 mg) by mouth every morning 30 tablet 1     benzoyl peroxide 10 % topical gel Apply topically At Bedtime 56 g 9     doxycycline (VIBRAMYCIN) 100 MG capsule Take 1 capsule (100 mg) by mouth 2 times daily 60 capsule 3     clindamycin (CLINDAMAX) 1 % gel Apply topically 2 times daily 60 g 11      ALLERGIES:  Allergies   Allergen Reactions     Cats        Problem list and histories reviewed & adjusted, as indicated.    OBJECTIVE:                                                    Phone visit.     ASSESSMENT/PLAN:                                                    Ren was seen today for medication request.    Diagnoses and all orders for this visit:    Attention deficit hyperactivity disorder (ADHD), unspecified ADHD type  -     amphetamine-dextroamphetamine (ADDERALL XR) 20 MG per 24 hr capsule; Take 1 capsule (20 mg) by mouth daily    Discussed risks and benefits of medication, take prior to 10 am or will have an impact on insomnia. If symptoms of ADHD worsen stop taking medication and notify the clinic.  Encouraged increased in exercise and counseling.   Discussed need to be seen every 3 months, needs to follow up in 3 months for a clinic visit at that time will complete a CSA.   Phone visit lasted 8 minutes.    Susan Haase, APRN CNP

## 2017-09-08 NOTE — MR AVS SNAPSHOT
"              After Visit Summary   2017    Ren Lawton    MRN: [id number]           Patient Information     Date Of Birth          1995        Visit Information        Provider Department      2017 1:30 PM Haase, Susan Rachele, APRN CNP [facility]        Today's Diagnoses     Attention deficit hyperactivity disorder (ADHD), unspecified ADHD type    -  1       Follow-ups after your visit        Follow-up notes from your care team     Return in about 3 months (around 2017).      Who to contact     If you have questions or need follow up information about today's clinic visit or your schedule please contact [facility] directly at [phone].  Normal or non-critical lab and imaging results will be communicated to you by MyChart, letter or phone within 4 business days after the clinic has received the results. If you do not hear from us within 7 days, please contact the clinic through MyChart or phone. If you have a critical or abnormal lab result, we will notify you by phone as soon as possible.  Submit refill requests through [employer] or call your pharmacy and they will forward the refill request to us. Please allow 3 business days for your refill to be completed.          Additional Information About Your Visit        MyChart Information     [employer]tex lets you send messages to your doctor, view your test results, renew your prescriptions, schedule appointments and more. To sign up, go to [email or web address] . Click on \"Log in\" on the left side of the screen, which will take you to the Welcome page. Then click on \"Sign up Now\" on the right side of the page.     You will be asked to enter the access code listed below, as well as some personal information. Please follow the directions to create your username and password.     Your access code is: [email or web address]  Expires: 2017  9:09 PM     Your access code will  in 90 days. If you need help or a " new code, please call your [place] clinic or [phone].        Care EveryWhere ID     This is your Care EveryWhere ID. This could be used by other organizations to access your [place] medical records  [id number]         Blood Pressure from Last 3 Encounters:   08/08/17 152/80   05/31/17 118/76   04/03/16 (!) 153/92    Weight from Last 3 Encounters:   08/08/17 215 lb (97.5 kg) (96 %)*   05/31/17 208 lb (94.3 kg) (95 %)*   04/03/16 230 lb (104.3 kg) (98 %)*     * Growth percentiles are based on [facility] 2-20 Years data.              Today, you had the following     No orders found for display         Today's Medication Changes          These changes are accurate as of: 9/8/17  9:09 PM.  If you have any questions, ask your nurse or doctor.               Start taking these medicines.        Dose/Directions    amphetamine-dextroamphetamine 20 MG per 24 hr capsule   Commonly known as:  ADDERALL XR   Used for:  Attention deficit hyperactivity disorder (ADHD), unspecified ADHD type   Started by:  Haase, Susan Rachele, APRN CNP        Dose:  20 mg   Take 1 capsule (20 mg) by mouth daily   Quantity:  30 capsule   Refills:  0            Where to get your medicines      Some of these will need a paper prescription and others can be bought over the counter.  Ask your nurse if you have questions.     Bring a paper prescription for each of these medications     amphetamine-dextroamphetamine 20 MG per 24 hr capsule                Primary Care Provider Office Phone # Fax #    Susan Rachele Haase, APRN -[phone] [address] [facility] [address]        Equal Access to Services     [facility] AH: Hadii tarik yarbrough Sodaxa, waaxda luqadaha, qaybta kaalmapuma epperson. So [facility] [phone].    ATENCIÓN: Si habla español, tiene a foley disposición servicios gratuitos de asistencia lingüística. Llame al [phone].    We comply with applicable [facility] civil  rights laws and [place] laws. We do not discriminate on the basis of race, color, national origin, age, disability sex, sexual orientation or gender identity.            Thank you!     Thank you for choosing [facility]  for your care. Our goal is always to provide you with excellent care. Hearing back from our patients is one way we can continue to improve our services. Please take a few minutes to complete the written survey that you may receive in the mail after your visit with us. Thank you!             Your Updated Medication List - Protect others around you: Learn how to safely use, store and throw away your medicines at www.disposemymeds.org.          This list is accurate as of: 9/8/17  9:09 PM.  Always use your most recent med list.                   Brand Name Dispense Instructions for use Diagnosis    amphetamine-dextroamphetamine 20 MG per 24 hr capsule    ADDERALL XR    30 capsule    Take 1 capsule (20 mg) by mouth daily    Attention deficit hyperactivity disorder (ADHD), unspecified ADHD type       benzoyl peroxide 10 % topical gel     56 g    Apply topically At Bedtime    Other acne       clindamycin 1 % topical gel    CLINDAMAX    60 g    Apply topically 2 times daily    Other acne       doxycycline 100 MG capsule    VIBRAMYCIN    60 capsule    Take 1 capsule (100 mg) by mouth 2 times daily    Other acne

## 2017-09-18 ENCOUNTER — TELEPHONE (OUTPATIENT)
Dept: FAMILY MEDICINE | Facility: CLINIC | Age: 22
End: 2017-09-18

## 2017-09-18 DIAGNOSIS — F90.9 ATTENTION DEFICIT HYPERACTIVITY DISORDER (ADHD), UNSPECIFIED ADHD TYPE: Primary | ICD-10-CM

## 2017-09-18 NOTE — TELEPHONE ENCOUNTER
Mother, Magy calling [facility] on file.  States on Adderall XR 20 mg since a week ago Friday and states is noticing it helps for school but then by afternoon is wearing off between 2-4 pm and trouble focusing again.  Wondering about increasing dose or taking something twice a day?  Please advise.  If new RX would pick-up at .  Call Magy back at [phone].  Malathi Lowe RN

## 2017-09-19 RX ORDER — DEXTROAMPHETAMINE SACCHARATE, AMPHETAMINE ASPARTATE, DEXTROAMPHETAMINE SULFATE AND AMPHETAMINE SULFATE 2.5; 2.5; 2.5; 2.5 MG/1; MG/1; MG/1; MG/1
10 TABLET ORAL
Qty: 30 TABLET | Refills: 0 | Status: SHIPPED | OUTPATIENT
Start: 2017-09-19 | End: 2017-10-11

## 2017-09-19 NOTE — TELEPHONE ENCOUNTER
Mom informed, TC will place RX up front for   Rolanda Tripp RN, BSN  Message handled by Nurse Triage.

## 2017-09-19 NOTE — TELEPHONE ENCOUNTER
Can you call mom and let her know the following: Let's try adderall 10 mg at lunch and see how that goes. My only concern is if this will cause problems with sleep?  Thanks,  Susan Haase, CNP

## 2017-10-09 DIAGNOSIS — F90.9 ATTENTION DEFICIT HYPERACTIVITY DISORDER (ADHD), UNSPECIFIED ADHD TYPE: ICD-10-CM

## 2017-10-09 NOTE — TELEPHONE ENCOUNTER
Controlled Substance Refill Request for amphetamine-dextroamphetamine (ADDERALL XR) 20 MG per 24 hr capsule  Problem List Complete:  Yes    Patient is followed by HAASE, SUSAN RACHELE for ongoing prescription of stimulants.  All refills should be approved by this provider, or covering partner.     Medication(s): adderall XR 20 mg every day.   Maximum quantity per month: #30  Clinic visit frequency required: Q 3 months      Controlled substance agreement on file: No  Neuropsych evaluation for ADD completed:  Yes, completed 8/22/2017, on file and diagnosis confirmed     Last [facility] website verification:  done on 9/8/2017      Last Written Prescription Date:  09/08/17  Last Fill Quantity: 30,   # refills: 0    Last Office Visit with [facility] primary care provider: 09/08/17    Clinic visit frequency required: Q 3 months     Future Office visit:   Next 5 appointments (look out 90 days)     Dec 05, 2017 10:00 AM CST   SHORT with Susan Rachele Haase, APRN [facility] ([facility])    [address] [place] [address] [id number] [phone]                  Controlled substance agreement on file: No.     Processing:  Patient will  in clinic     checked in past 6 months?  Yes 09/08/17

## 2017-10-11 RX ORDER — DEXTROAMPHETAMINE SACCHARATE, AMPHETAMINE ASPARTATE MONOHYDRATE, DEXTROAMPHETAMINE SULFATE AND AMPHETAMINE SULFATE 5; 5; 5; 5 MG/1; MG/1; MG/1; MG/1
20 CAPSULE, EXTENDED RELEASE ORAL DAILY
Qty: 30 CAPSULE | Refills: 0 | Status: SHIPPED | OUTPATIENT
Start: 2017-10-11 | End: 2017-11-09

## 2017-10-11 RX ORDER — DEXTROAMPHETAMINE SACCHARATE, AMPHETAMINE ASPARTATE, DEXTROAMPHETAMINE SULFATE AND AMPHETAMINE SULFATE 2.5; 2.5; 2.5; 2.5 MG/1; MG/1; MG/1; MG/1
10 TABLET ORAL
Qty: 30 TABLET | Refills: 0 | Status: SHIPPED | OUTPATIENT
Start: 2017-10-11 | End: 2017-12-05

## 2017-10-11 NOTE — TELEPHONE ENCOUNTER
"Called mom, discussed, mom does not appear upset, \"blames on son not informing until out of med\", mom trying to get son more involved in process, discussed Mychart as this is processed same day so hopefully no delays, mom would prefer that and also would put responsibility on pt, signed rx's placed up front, mom will f/u with pharmacy if insurance issue, aware [facility] wrote ok to fill early, Emma Tripp, RN, BSN  Message handled by Nurse Triage.    "

## 2017-10-11 NOTE — TELEPHONE ENCOUNTER
Can you let Odalys know about this, I am upset as well that I didn't get this refill until today, now he is going to have difficulty getting the 10's reiflled because it it too early.    I will write on the prescription to fill early so hopefully not an issue.    Thanks,  Susan Haase, CNP

## 2017-10-11 NOTE — TELEPHONE ENCOUNTER
Mom reports pt has been taking 10 mg  2 tablets daily since Monday because out of pills Monday.  So now need 10 mg refilled too.  Please call mom [phone] when Rx's ready for        (Apurva, mom upset. Was told someone would call her Monday.)  Ramiro Brooks RN

## 2017-10-11 NOTE — TELEPHONE ENCOUNTER
Mom called to check on status of refill.  I advised that the prescription was not ready yet.  I advised of the 24-72 hour turn around time.  Mom states patient needs prescription today and requested to speak to a nurse.    Astrid Clemons/

## 2017-11-09 DIAGNOSIS — F90.9 ATTENTION DEFICIT HYPERACTIVITY DISORDER (ADHD), UNSPECIFIED ADHD TYPE: ICD-10-CM

## 2017-11-09 NOTE — TELEPHONE ENCOUNTER
Controlled Substance Refill Request for Adderall  Problem List Complete:  Yes  Patient is followed by HAASE, SUSAN RACHELE for ongoing prescription of stimulants.  All refills should be approved by this provider, or covering partner.     Medication(s): adderall XR 20 mg every day.   Maximum quantity per month: #30  Clinic visit frequency required: Q 3 months      Controlled substance agreement on file: No  Neuropsych evaluation for ADD completed:  Yes, completed 8/22/2017, on file and diagnosis confirmed     Last [facility] website verification:  done on 9/8/20  Last Written Prescription Date:  10/11/17  Last Fill Quantity: 30,   # refills: 0    Last Office Visit with [facility] primary care provider: 8/08/17 w/Susan Haase    Clinic visit frequency required: Q 3 months     Future Office visit:   Next 5 appointments (look out 90 days)     Dec 05, 2017 10:00 AM CST   SHORT with Susan Rachele Haase, APRN [facility] ([facility])    [address] [place] [id number] [phone]                  Controlled substance agreement on file: No.     Processing:  Patient will  in clinic     checked in past 6 months?  Yes 09/08/17

## 2017-11-10 RX ORDER — DEXTROAMPHETAMINE SACCHARATE, AMPHETAMINE ASPARTATE MONOHYDRATE, DEXTROAMPHETAMINE SULFATE AND AMPHETAMINE SULFATE 5; 5; 5; 5 MG/1; MG/1; MG/1; MG/1
20 CAPSULE, EXTENDED RELEASE ORAL DAILY
Qty: 30 CAPSULE | Refills: 0 | Status: SHIPPED | OUTPATIENT
Start: 2017-11-10 | End: 2017-12-05 | Stop reason: DRUGHIGH

## 2017-11-10 NOTE — TELEPHONE ENCOUNTER
Can you let Ren know that he will be due for an in clinic visit in 12/2017.  Thanks,  Susan Haase, CNP

## 2017-12-01 DIAGNOSIS — F90.9 ATTENTION DEFICIT HYPERACTIVITY DISORDER (ADHD), UNSPECIFIED ADHD TYPE: ICD-10-CM

## 2017-12-04 RX ORDER — DEXTROAMPHETAMINE SACCHARATE, AMPHETAMINE ASPARTATE, DEXTROAMPHETAMINE SULFATE AND AMPHETAMINE SULFATE 2.5; 2.5; 2.5; 2.5 MG/1; MG/1; MG/1; MG/1
10 TABLET ORAL
Qty: 30 TABLET | Refills: 0 | OUTPATIENT
Start: 2017-12-04

## 2017-12-05 ENCOUNTER — OFFICE VISIT (OUTPATIENT)
Dept: FAMILY MEDICINE | Facility: CLINIC | Age: 22
End: 2017-12-05
Payer: COMMERCIAL

## 2017-12-05 VITALS
BODY MASS INDEX: 29.01 KG/M2 | HEART RATE: 78 BPM | DIASTOLIC BLOOD PRESSURE: 70 MMHG | WEIGHT: 208 LBS | RESPIRATION RATE: 12 BRPM | SYSTOLIC BLOOD PRESSURE: 130 MMHG | TEMPERATURE: 98 F | OXYGEN SATURATION: 100 %

## 2017-12-05 DIAGNOSIS — F90.9 ATTENTION DEFICIT HYPERACTIVITY DISORDER (ADHD), UNSPECIFIED ADHD TYPE: Primary | ICD-10-CM

## 2017-12-05 PROCEDURE — 80307 DRUG TEST PRSMV CHEM ANLYZR: CPT | Mod: 90 | Performed by: NURSE PRACTITIONER

## 2017-12-05 PROCEDURE — 99000 SPECIMEN HANDLING OFFICE-LAB: CPT | Performed by: NURSE PRACTITIONER

## 2017-12-05 PROCEDURE — 99213 OFFICE O/P EST LOW 20 MIN: CPT | Performed by: NURSE PRACTITIONER

## 2017-12-05 RX ORDER — DEXTROAMPHETAMINE SACCHARATE, AMPHETAMINE ASPARTATE MONOHYDRATE, DEXTROAMPHETAMINE SULFATE AND AMPHETAMINE SULFATE 7.5; 7.5; 7.5; 7.5 MG/1; MG/1; MG/1; MG/1
30 CAPSULE, EXTENDED RELEASE ORAL DAILY
Qty: 30 CAPSULE | Refills: 0 | Status: SHIPPED | OUTPATIENT
Start: 2017-12-10 | End: 2017-12-05

## 2017-12-05 RX ORDER — DEXTROAMPHETAMINE SACCHARATE, AMPHETAMINE ASPARTATE, DEXTROAMPHETAMINE SULFATE AND AMPHETAMINE SULFATE 2.5; 2.5; 2.5; 2.5 MG/1; MG/1; MG/1; MG/1
10 TABLET ORAL
Qty: 30 TABLET | Refills: 0 | Status: SHIPPED | OUTPATIENT
Start: 2018-02-05 | End: 2018-04-04

## 2017-12-05 RX ORDER — DEXTROAMPHETAMINE SACCHARATE, AMPHETAMINE ASPARTATE, DEXTROAMPHETAMINE SULFATE AND AMPHETAMINE SULFATE 2.5; 2.5; 2.5; 2.5 MG/1; MG/1; MG/1; MG/1
10 TABLET ORAL
Qty: 30 TABLET | Refills: 0 | Status: SHIPPED | OUTPATIENT
Start: 2018-01-05 | End: 2017-12-05

## 2017-12-05 RX ORDER — DEXTROAMPHETAMINE SACCHARATE, AMPHETAMINE ASPARTATE MONOHYDRATE, DEXTROAMPHETAMINE SULFATE AND AMPHETAMINE SULFATE 7.5; 7.5; 7.5; 7.5 MG/1; MG/1; MG/1; MG/1
30 CAPSULE, EXTENDED RELEASE ORAL DAILY
Qty: 30 CAPSULE | Refills: 0 | Status: SHIPPED | OUTPATIENT
Start: 2018-02-10 | End: 2018-04-04

## 2017-12-05 RX ORDER — DEXTROAMPHETAMINE SACCHARATE, AMPHETAMINE ASPARTATE MONOHYDRATE, DEXTROAMPHETAMINE SULFATE AND AMPHETAMINE SULFATE 7.5; 7.5; 7.5; 7.5 MG/1; MG/1; MG/1; MG/1
30 CAPSULE, EXTENDED RELEASE ORAL DAILY
Qty: 30 CAPSULE | Refills: 0 | Status: SHIPPED | OUTPATIENT
Start: 2018-01-10 | End: 2017-12-05

## 2017-12-05 RX ORDER — DEXTROAMPHETAMINE SACCHARATE, AMPHETAMINE ASPARTATE, DEXTROAMPHETAMINE SULFATE AND AMPHETAMINE SULFATE 2.5; 2.5; 2.5; 2.5 MG/1; MG/1; MG/1; MG/1
10 TABLET ORAL
Qty: 30 TABLET | Refills: 0 | Status: SHIPPED | OUTPATIENT
Start: 2017-12-05 | End: 2017-12-05

## 2017-12-05 NOTE — LETTER
December 18, 2017      Ren Lawton  [address] [facility] [address]        Hi Ren,   Your urine drug screen was positive for THC.  This is an illegal substance and can not be taken with adderall.  Please follow up for a clinic visit in 3 months, at that appointment we will be required to complete another drug test.  If you have any future tests that are positive for THC I will no longer be able to prescribe adderall.       Resulted Orders   Drug  Screen Comprehensive , Urine with Reported Meds (MedTox) (Pain Care Package)   Result Value Ref Range    Pain Drug SCR UR W RPTD Meds FINAL       Comment:      (Note)  ====================================================================  [facility]RE COMP DRUG ANALYSIS,UR  ====================================================================  Test                             Result       Flag       Units        Drug Present not Declared for Prescription Verification   Amphetamine                    2703         UNEXPECTED ng/mg creat    Amphetamine is available as a schedule II prescription drug.   Carboxy-THC                    131          UNEXPECTED ng/mg creat    Carboxy-THC is a metabolite of tetrahydrocannabinol  (THC).    Source of THC is most commonly illicit, but THC is also present    in a scheduled prescription medication.   Ibuprofen                      PRESENT      UNEXPECTED              Drug Absent but Declared for Prescription Verification   Ephedrine/Pseudoephedrine      Not Detected UNEXPECTED              ====================================================================  Test                      Result    Flag   Units        Ref Range        Creatinine              202              mg/dL      >=20            ====================================================================  Declared Medications:  The flagging and interpretation on this report are based on the  following declared medications.  Unexpected results may arise  from  inaccuracies in the declared medications.  **Note: The testing scope of this panel includes these medications:  Pseudoephedrine (Sudafed)  ====================================================================  For clinical consultation, please call [phone].  ====================================================================  Analysis performed by [facility] [employer] [employer] [place] [address]         If you have any questions or concerns, please call the clinic at the number listed above.       Sincerely,        Susan Haase, APRN CNP

## 2017-12-05 NOTE — PROGRESS NOTES
"  SUBJECTIVE:   Ren Lawton is a 22 year old male who presents to clinic today for the following health issues:    HPI  ADHD Follow-Up    Date of last ADHD office visit: 9/8/2017  Status since last visit: Improving  Taking controlled (daily) medications as prescribed: Yes                       Parent/Patient Concerns with Medications: focus   School:  Name of : [employer]  Grade: College - 2 classes  School Concerns/Teacher Feedback: None  School services/Modifications: none  Homework: Improving  Grades: Stable    Sleep: trouble falling asleep and trouble staying asleep  Home/Family Concerns: None  Peer Concerns: None    Co-Morbid Diagnosis: None    Currently in counseling: No    Follow-up [place] completed: Criteria met for ADHD -  Inattentive    Medication Benefits:   Controlled symptoms: Hyperactivity - motor restlessness, Attention span, Distractability, Finishing tasks, Impulse control, Frustration tolerance, Accepting limits and Peer relations  Uncontrolled symptoms: None    Medication side effects:  Side effects noted: appetite suppression and weight loss  Denies: insomnia, tics, palpitations, stomach ache, headache, emotional lability, rebound irritability, drowsiness, \"zombie\" effect, growth suppression and dry mouth    Ren reports that his energy levels have been much better since starting on Adderall, but he is concerned that he is not as focused. He reports that he has had problems sleeping, but says he has had problems with sleep in the past and doesn't think that is due to the Adderall. He also has also noticed he has had a decreased appetite and has lost weight.      Problem list and histories reviewed & adjusted, as indicated.  Additional history: as documented    Patient Active Problem List   Diagnosis     GERD (gastroesophageal reflux disease)     Intervertebral disc protrusion     Lumbar foraminal stenosis     Obesity     Anxiety     Attention deficit hyperactivity " disorder (ADHD), unspecified ADHD type     History reviewed. No pertinent surgical history.    Social History   Substance Use Topics     Smoking status: Current Every Day Smoker     Smokeless tobacco: Never Used     Alcohol use Yes     Family History   Problem Relation Age of Onset     DIABETES Mother      gestational diabetes     DIABETES Paternal Grandmother          Reviewed and updated as needed this visit by clinical staff       Reviewed and updated as needed this visit by Provider       ROS:  Constitutional, HEENT, cardiovascular, pulmonary, GI, , musculoskeletal, neuro, skin, endocrine and psych systems are negative, except as otherwise noted.    This document serves as a record of the services and decisions personally performed and made by Susan Haase, CNP. It was created on her behalf by Raphael Miller, a trained medical scribe. The creation of this document is based the provider's statements to the medical scribe.  Raphael Miller December 5, 2017 10:12 AM      OBJECTIVE:   /70 (BP Location: Left arm, Patient Position: Chair, Cuff Size: Adult Large)  Pulse 78  Temp 98  F (36.7  C) (Oral)  Resp 12  Wt 94.3 kg (208 lb)  SpO2 100%  BMI 29.01 kg/m2  Body mass index is 29.01 kg/(m^2).    GENERAL:  Alert and interactive.,   EYES:  Normal extra-ocular movements.  PERRLA,   LUNGS:  Clear,   HEART:  Normal rate and rhythm.  Normal S1 and S2.  No murmurs.,   ABDOMEN:  Soft, non-tender, no organomegaly. and   NEURO:  No tics or tremor.  Normal tone and strength. Normal gait and balance.       ASSESSMENT/PLAN:   Ren was seen today for recheck medication.    Diagnoses and all orders for this visit:    Attention deficit hyperactivity disorder (ADHD), unspecified ADHD type:  Will increase adderall XR to 30 mg every day, continue 10 mg IR at noon.    -     Drug  Screen Comprehensive , Urine with Reported Meds (MedTox) (Pain Care Package)  -     Discontinue: amphetamine-dextroamphetamine (ADDERALL XR) 30 MG per  24 hr capsule; Take 1 capsule (30 mg) by mouth daily  -     Discontinue: amphetamine-dextroamphetamine (ADDERALL XR) 30 MG per 24 hr capsule; Take 1 capsule (30 mg) by mouth daily  -     amphetamine-dextroamphetamine (ADDERALL XR) 30 MG per 24 hr capsule; Take 1 capsule (30 mg) by mouth daily  -     Discontinue: amphetamine-dextroamphetamine (ADDERALL) 10 MG per tablet; Take 1 tablet (10 mg) by mouth daily (with lunch)  -     Discontinue: amphetamine-dextroamphetamine (ADDERALL) 10 MG per tablet; Take 1 tablet (10 mg) by mouth daily (with lunch)  -     amphetamine-dextroamphetamine (ADDERALL) 10 MG per tablet; Take 1 tablet (10 mg) by mouth daily (with lunch)  Reviewed and signed controlled substance agreement, copy given to Ren.   Follow up  Phone visit in 3 months.   The information in this document, created by the medical scribe for me, accurately reflects the services I personally performed and the decisions made by me. I have reviewed and approved this document for accuracy.   Susan Haase, CNP Susan Haase, ANIRUDH [facility]          .[email or web address]

## 2017-12-05 NOTE — MR AVS SNAPSHOT
"              After Visit Summary   2017    Ren Lawton    MRN: [id number]           Patient Information     Date Of Birth          1995        Visit Information        Provider Department      2017 10:00 AM Haase, Susan Rachele, APRN CNP [facility]        Today's Diagnoses     Attention deficit hyperactivity disorder (ADHD), unspecified ADHD type    -  1       Follow-ups after your visit        Follow-up notes from your care team     Return in about 3 months (around 3/5/2018) for phone visit.      Who to contact     If you have questions or need follow up information about today's clinic visit or your schedule please contact [facility] directly at [phone].  Normal or non-critical lab and imaging results will be communicated to you by MyChart, letter or phone within 4 business days after the clinic has received the results. If you do not hear from us within 7 days, please contact the clinic through MyChart or phone. If you have a critical or abnormal lab result, we will notify you by phone as soon as possible.  Submit refill requests through [employer] or call your pharmacy and they will forward the refill request to us. Please allow 3 business days for your refill to be completed.          Additional Information About Your Visit        MyChart Information     [employer] lets you send messages to your doctor, view your test results, renew your prescriptions, schedule appointments and more. To sign up, go to [email or web address] . Click on \"Log in\" on the left side of the screen, which will take you to the Welcome page. Then click on \"Sign up Now\" on the right side of the page.     You will be asked to enter the access code listed below, as well as some personal information. Please follow the directions to create your username and password.     Your access code is: [email or web address]  Expires: 2017  8:09 PM     Your access code will  in 90 days. If you " need help or a new code, please call your [place] clinic or [phone].        Care EveryWhere ID     This is your Care EveryWhere ID. This could be used by other organizations to access your [place] medical records  [id number]        Your Vitals Were     Pulse Temperature Respirations Pulse Oximetry BMI (Body Mass Index)       78 98  F (36.7  C) (Oral) 12 100% 29.01 kg/m2        Blood Pressure from Last 3 Encounters:   12/05/17 130/70   08/08/17 152/80   05/31/17 118/76    Weight from Last 3 Encounters:   12/05/17 208 lb (94.3 kg)   08/08/17 215 lb (97.5 kg) (96 %)*   05/31/17 208 lb (94.3 kg) (95 %)*     * Growth percentiles are based on [facility] 2-20 Years data.              We Performed the Following     Drug  Screen Comprehensive , Urine with Reported Meds (MedTox) (Pain Care Package)        Primary Care Provider Office Phone # Fax #    Susan Rachele Haase, APRN -[phone] [address] [facility] [address]        Equal Access to Services     [facility] : Hadii aad ku hadasho Socristianeali, waaxda luqadaha, qaybta kaalmada adeegyada, puma gao . So [place] [phone].    ATENCIÓN: Si habla español, tiene a foley disposición servicios gratuitos de asistencia lingüística. Llame al [phone].    We comply with applicable federal civil rights laws and [place] laws. We do not discriminate on the basis of race, color, national origin, age, disability, sex, sexual orientation, or gender identity.            Thank you!     Thank you for choosing [facility]  for your care. Our goal is always to provide you with excellent care. Hearing back from our patients is one way we can continue to improve our services. Please take a few minutes to complete the written survey that you may receive in the mail after your visit with us. Thank you!             Your Updated Medication List - Protect others around you: Learn how to safely use, store and throw  away your medicines at www.disposemymeds.org.          This list is accurate as of: 12/5/17 10:29 AM.  Always use your most recent med list.                   Brand Name Dispense Instructions for use Diagnosis    * amphetamine-dextroamphetamine 10 MG per tablet    ADDERALL    30 tablet    Take 1 tablet (10 mg) by mouth daily (with lunch)    Attention deficit hyperactivity disorder (ADHD), unspecified ADHD type       * amphetamine-dextroamphetamine 20 MG per 24 hr capsule    ADDERALL XR    30 capsule    Take 1 capsule (20 mg) by mouth daily    Attention deficit hyperactivity disorder (ADHD), unspecified ADHD type       benzoyl peroxide 10 % topical gel     56 g    Apply topically At Bedtime    Other acne       * Notice:  This list has 2 medication(s) that are the same as other medications prescribed for you. Read the directions carefully, and ask your doctor or other care provider to review them with you.

## 2017-12-09 LAB — PAIN DRUG SCR UR W RPTD MEDS: NORMAL

## 2018-01-06 ENCOUNTER — HOSPITAL ENCOUNTER (EMERGENCY)
Facility: CLINIC | Age: 23
Discharge: HOME | End: 2018-01-06
Attending: EMERGENCY MEDICINE | Admitting: EMERGENCY MEDICINE
Payer: COMMERCIAL

## 2018-01-06 VITALS
TEMPERATURE: 97.5 F | HEART RATE: 72 BPM | SYSTOLIC BLOOD PRESSURE: 137 MMHG | OXYGEN SATURATION: 100 % | DIASTOLIC BLOOD PRESSURE: 85 MMHG | RESPIRATION RATE: 16 BRPM

## 2018-01-06 DIAGNOSIS — R55 SYNCOPE, UNSPECIFIED SYNCOPE TYPE: ICD-10-CM

## 2018-01-06 LAB
ANION GAP SERPL CALCULATED.3IONS-SCNC: 5 MMOL/L (ref 3–14)
BASOPHILS # BLD AUTO: 0.1 10E9/L (ref 0–0.2)
BASOPHILS NFR BLD AUTO: 0.5 %
BUN SERPL-MCNC: 13 MG/DL (ref 7–30)
CALCIUM SERPL-MCNC: 8.9 MG/DL (ref 8.5–10.1)
CHLORIDE SERPL-SCNC: 106 MMOL/L (ref 94–109)
CO2 SERPL-SCNC: 29 MMOL/L (ref 20–32)
CREAT SERPL-MCNC: 0.94 MG/DL (ref 0.66–1.25)
DIFFERENTIAL METHOD BLD: ABNORMAL
EOSINOPHIL # BLD AUTO: 0.2 10E9/L (ref 0–0.7)
EOSINOPHIL NFR BLD AUTO: 0.9 %
ERYTHROCYTE [DISTWIDTH] IN BLOOD BY AUTOMATED COUNT: 12.5 % (ref 10–15)
GFR SERPL CREATININE-BSD FRML MDRD: >90 ML/MIN/1.7M2
GLUCOSE SERPL-MCNC: 97 MG/DL (ref 70–99)
HCT VFR BLD AUTO: 52.1 % (ref 40–53)
HGB BLD-MCNC: 17.6 G/DL (ref 13.3–17.7)
IMM GRANULOCYTES # BLD: 0.3 10E9/L (ref 0–0.4)
IMM GRANULOCYTES NFR BLD: 1.9 %
INTERPRETATION ECG - MUSE: NORMAL
LYMPHOCYTES # BLD AUTO: 2.2 10E9/L (ref 0.8–5.3)
LYMPHOCYTES NFR BLD AUTO: 12.4 %
MCH RBC QN AUTO: 30.7 PG (ref 26.5–33)
MCHC RBC AUTO-ENTMCNC: 33.8 G/DL (ref 31.5–36.5)
MCV RBC AUTO: 91 FL (ref 78–100)
MONOCYTES # BLD AUTO: 0.6 10E9/L (ref 0–1.3)
MONOCYTES NFR BLD AUTO: 3.4 %
NEUTROPHILS # BLD AUTO: 14.3 10E9/L (ref 1.6–8.3)
NEUTROPHILS NFR BLD AUTO: 80.9 %
NRBC # BLD AUTO: 0 10*3/UL
NRBC BLD AUTO-RTO: 0 /100
PLATELET # BLD AUTO: 280 10E9/L (ref 150–450)
POTASSIUM SERPL-SCNC: 4.5 MMOL/L (ref 3.4–5.3)
RBC # BLD AUTO: 5.73 10E12/L (ref 4.4–5.9)
SODIUM SERPL-SCNC: 140 MMOL/L (ref 133–144)
TROPONIN I SERPL-MCNC: <0.015 UG/L (ref 0–0.04)
WBC # BLD AUTO: 17.6 10E9/L (ref 4–11)

## 2018-01-06 PROCEDURE — 25000128 H RX IP 250 OP 636: Performed by: EMERGENCY MEDICINE

## 2018-01-06 PROCEDURE — 99284 EMERGENCY DEPT VISIT MOD MDM: CPT | Mod: 25

## 2018-01-06 PROCEDURE — 80048 BASIC METABOLIC PNL TOTAL CA: CPT | Performed by: PHYSICIAN ASSISTANT

## 2018-01-06 PROCEDURE — 84484 ASSAY OF TROPONIN QUANT: CPT | Performed by: PHYSICIAN ASSISTANT

## 2018-01-06 PROCEDURE — 85025 COMPLETE CBC W/AUTO DIFF WBC: CPT | Performed by: PHYSICIAN ASSISTANT

## 2018-01-06 PROCEDURE — 96360 HYDRATION IV INFUSION INIT: CPT

## 2018-01-06 PROCEDURE — 93005 ELECTROCARDIOGRAM TRACING: CPT

## 2018-01-06 RX ADMIN — SODIUM CHLORIDE, POTASSIUM CHLORIDE, SODIUM LACTATE AND CALCIUM CHLORIDE 1000 ML: 600; 310; 30; 20 INJECTION, SOLUTION INTRAVENOUS at 15:40

## 2018-01-06 ASSESSMENT — ENCOUNTER SYMPTOMS
ABDOMINAL PAIN: 0
HEADACHES: 0
VOMITING: 0
COLOR CHANGE: 1
NUMBNESS: 1
NAUSEA: 1

## 2018-01-06 NOTE — ED AVS SNAPSHOT
[facility] Emergency Department    [address]    [facility] [address]    Phone:  [phone]    Fax:  [phone]                                       Ren Lawton   MRN: [id number]    Department:  [facility] Emergency Department   Date of Visit:  1/6/2018           Patient Information     Date Of Birth          1995        Your diagnoses for this visit were:     Syncope, unspecified syncope type        You were seen by Merna Carias MD.      Follow-up Information     Follow up with Haase, Susan Rachele, APRN CNP In 3 days.    Specialty:  Nurse Practitioner    Contact information:    [address]  [facility] [id number] [phone]          Follow up with [facility] Emergency Department.    Specialty:  EMERGENCY MEDICINE    Why:  As needed, If symptoms worsen    Contact information:    [address] [facility] [id number] [phone]        Discharge Instructions         Discharge Instructions  Syncope    Syncope (fainting) is a sudden, short loss of consciousness (passing out spell). People will usually fall to the ground when they faint or slump over if seated.  People may also shake when this happens, and it can sometimes be difficult to tell the difference between syncope and a seizure. At this time, your provider does not find a reason to suspect that your fainting spell is a sign of anything dangerous or life-threatening.  However, sometimes the signs of serious illness do not show up right away.     Generally, every Emergency Department visit should have a follow-up clinic visit with either a primary or a specialty clinic/provider. Please follow-up as instructed by your emergency provider today.    Return to the Emergency Department if:    You faint again.     You have any significant bleeding.    You have chest pain or a fast or irregular heartbeat.    You feel short of breath.    You cough up any blood.    You  have abdominal (belly) pain or unusual back pain.    You have ongoing vomiting (throwing up) or diarrhea (loose stools).    You have a black or tarry bowel movement, or blood in the stool or in your vomit.    You have a fever over 101 F.    You lose feeling or cannot move a part of your body or cannot talk normally.    You are confused, have a headache, cannot see well, or have a seizure.    DO NOT DRIVE. CALL 911 INSTEAD!    What can I do to help myself?    Follow any specific instructions that your provider discussed with you.    If you feel light-headed, make sure to sit down right away, even if you have to sit on the floor.    Follow up with your regular medical provider as discussed for further management. This may include lowering your blood pressure medications, insulin or other diabetic medications, checking your blood sugar more frequently, and drinking more fluids, taking medicines for vomiting or diarrhea or getting up slower.  If you were given a prescription for medicine here today, be sure to read all of the information (including the package insert) that comes with your prescription.  This will include important information about the medicine, its side effects, and any warnings that you need to know about.  The pharmacist who fills the prescription can provide more information and answer questions you may have about the medicine.  If you have questions or concerns that the pharmacist cannot address, please call or return to the Emergency Department.   Remember that you can always come back to the Emergency Department if you are not able to see your regular provider in the amount of time listed above, if you get any new symptoms, or if there is anything that worries you.      24 Hour Appointment Hotline       To make an appointment at any [facility], call [phone] ([phone]). If you don't have a family doctor or clinic, we will help you find one. [facility] are conveniently  located to serve the needs of you and your family.             Review of your medicines      Our records show that you are taking the medicines listed below. If these are incorrect, please call your family doctor or clinic.        Dose / Directions Last dose taken    * amphetamine-dextroamphetamine 10 MG per tablet   Commonly known as:  ADDERALL   Dose:  10 mg   Quantity:  30 tablet   Start taking on:  2/5/2018        Take 1 tablet (10 mg) by mouth daily (with lunch)   Refills:  0        * amphetamine-dextroamphetamine 30 MG per 24 hr capsule   Commonly known as:  ADDERALL XR   Dose:  30 mg   Quantity:  30 capsule   Start taking on:  2/10/2018        Take 1 capsule (30 mg) by mouth daily   Refills:  0        benzoyl peroxide 10 % topical gel   Quantity:  56 g        Apply topically At Bedtime   Refills:  9        * Notice:  This list has 2 medication(s) that are the same as other medications prescribed for you. Read the directions carefully, and ask your doctor or other care provider to review them with you.            Procedures and tests performed during your visit     Basic metabolic panel (BMP)    CBC + differential    EKG 12 lead    Orthostatic blood pressure and pulse    Troponin I (now)      Orders Needing Specimen Collection     None      Pending Results     Date and Time Order Name Status Description    1/6/2018 1357 EKG 12 lead Preliminary             Pending Culture Results     No orders found from 1/4/2018 to 1/7/2018.            Pending Results Instructions     If you had any lab results that were not finalized at the time of your Discharge, you can call the ED Lab Result RN at [phone]. You will be contacted by this team for any positive Lab results or changes in treatment. The nurses are available 7 days a week from 10A to 6:30P.  You can leave a message 24 hours per day and they will return your call.        Test Results From Your Hospital Stay        1/6/2018  2:55 PM      Component Results      Component Value Ref Range & Units Status    WBC 17.6 (H) 4.0 - 11.0 10e9/L Final    RBC Count 5.73 4.4 - 5.9 10e12/L Final    Hemoglobin 17.6 13.3 - 17.7 g/dL Final    Hematocrit 52.1 40.0 - 53.0 % Final    MCV 91 78 - 100 fl Final    MCH 30.7 26.5 - 33.0 pg Final    MCHC 33.8 31.5 - 36.5 g/dL Final    RDW 12.5 10.0 - 15.0 % Final    Platelet Count 280 150 - 450 10e9/L Final    Diff Method Automated Method  Final    % Neutrophils 80.9 % Final    % Lymphocytes 12.4 % Final    % Monocytes 3.4 % Final    % Eosinophils 0.9 % Final    % Basophils 0.5 % Final    % Immature Granulocytes 1.9 % Final    Nucleated RBCs 0 0 /100 Final    Absolute Neutrophil 14.3 (H) 1.6 - 8.3 10e9/L Final    Absolute Lymphocytes 2.2 0.8 - 5.3 10e9/L Final    Absolute Monocytes 0.6 0.0 - 1.3 10e9/L Final    Absolute Eosinophils 0.2 0.0 - 0.7 10e9/L Final    Absolute Basophils 0.1 0.0 - 0.2 10e9/L Final    Abs Immature Granulocytes 0.3 0 - 0.4 10e9/L Final    Absolute Nucleated RBC 0.0  Final         1/6/2018  3:14 PM      Component Results     Component Value Ref Range & Units Status    Sodium 140 133 - 144 mmol/L Final    Potassium 4.5 3.4 - 5.3 mmol/L Final    Chloride 106 94 - 109 mmol/L Final    Carbon Dioxide 29 20 - 32 mmol/L Final    Anion Gap 5 3 - 14 mmol/L Final    Glucose 97 70 - 99 mg/dL Final    Urea Nitrogen 13 7 - 30 mg/dL Final    Creatinine 0.94 0.66 - 1.25 mg/dL Final    GFR Estimate >90 >60 mL/min/1.7m2 Final    Non  GFR Calc    GFR Estimate If Black >90 >60 mL/min/1.7m2 Final    African American GFR Calc    Calcium 8.9 8.5 - 10.1 mg/dL Final         1/6/2018  3:14 PM      Component Results     Component Value Ref Range & Units Status    Troponin I ES <0.015 0.000 - 0.045 ug/L Final    The 99th percentile for upper reference range is 0.045 ug/L.  Troponin values   in the range of 0.045 - 0.120 ug/L may be associated with risks of adverse   clinical events.                  Clinical Quality Measure: Blood  Pressure Screening     Your blood pressure was checked while you were in the emergency department today. The last reading we obtained was  BP: (!) 139/93 . Please read the guidelines below about what these numbers mean and what you should do about them.  If your systolic blood pressure (the top number) is less than 120 and your diastolic blood pressure (the bottom number) is less than 80, then your blood pressure is normal. There is nothing more that you need to do about it.  If your systolic blood pressure (the top number) is 120-139 or your diastolic blood pressure (the bottom number) is 80-89, your blood pressure may be higher than it should be. You should have your blood pressure rechecked within a year by a primary care provider.  If your systolic blood pressure (the top number) is 140 or greater or your diastolic blood pressure (the bottom number) is 90 or greater, you may have high blood pressure. High blood pressure is treatable, but if left untreated over time it can put you at risk for heart attack, stroke, or kidney failure. You should have your blood pressure rechecked by a primary care provider within the next 4 weeks.  If your provider in the emergency department today gave you specific instructions to follow-up with your doctor or provider even sooner than that, you should follow that instruction and not wait for up to 4 weeks for your follow-up visit.        Thank you for choosing [facility]       Thank you for choosing [facility] for your care. Our goal is always to provide you with excellent care. Hearing back from our patients is one way we can continue to improve our services. Please take a few minutes to complete the written survey that you may receive in the mail after you visit with us. Thank you!        [facility] Information     [employer] lets you send messages to your doctor, view your test results, renew your prescriptions, schedule appointments and more. To sign up, go to [email or web address]  [email or web address] Click on \"Log in\" on the left side of the screen, which will take you to the Welcome page. Then click on \"Sign up Now\" on the right side of the page.     You will be asked to enter the access code listed below, as well as some personal information. Please follow the directions to create your username and password.     Your access code is: [email or web address]  Expires: 2018  4:25 PM     Your access code will  in 90 days. If you need help or a new code, please call your [facility] clinic or [phone].        Care EveryWhere ID     This is your Care EveryWhere ID. This could be used by other organizations to access your [facility] medical records  [id number]        Equal Access to Services     KLARISSA HALE : Woody Mclean, mane nath, favio kaalangelica soliz, puma gao . So [facility] [phone].    ATENCIÓN: Si habla español, tiene a foley disposición servicios gratuitos de asistencia lingüística. Llame al [phone].    We comply with applicable federal civil rights laws and [place] laws. We do not discriminate on the basis of race, color, national origin, age, disability, sex, sexual orientation, or gender identity.            After Visit Summary       This is your record. Keep this with you and show to your community pharmacist(s) and doctor(s) at your next visit.                  "

## 2018-01-06 NOTE — ED AVS SNAPSHOT
[facility] Emergency Department    [address]    [facility] [address]    Phone:  [phone]    Fax:  [phone]                                       Ren Lawton   MRN: [id number]    Department:  [facility] Emergency Department   Date of Visit:  1/6/2018           After Visit Summary Signature Page     I have received my discharge instructions, and my questions have been answered. I have discussed any challenges I see with this plan with the nurse or doctor.    ..........................................................................................................................................  Patient/Patient Representative Signature      ..........................................................................................................................................  Patient Representative Print Name and Relationship to Patient    ..................................................               ................................................  Date                                            Time    ..........................................................................................................................................  Reviewed by Signature/Title    ...................................................              ..............................................  Date                                                            Time

## 2018-01-06 NOTE — ED NOTES
"ABC's intact.  Alert and oriented x4.    Pt states at work today he felt \"pressure to my face\" and noticed his face was reddened when looking in mirror.  Pt went to tell his boss.  As he was sitting with his boss, pt reportedely \"lost consciousness\" slumped in chair.  As pt awoke, his boss was helping him to the floor.  Pt states he now feels fine.  He reports he had an acid reflux sensation throughout the morning and took an omeprazole 20 mg. Pt states he continues to have acid reflux sensation.  "

## 2018-01-06 NOTE — ED PROVIDER NOTES
"  History     Chief Complaint:  Loss of Consciousness      HPI   Ren Lawton is a 22 year old male who presents with loss of consciousness. 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 He went to talk to his boss who told him to sit down. After sitting down he saw flashing lights and felt presyncopal. He then lost consciousness and slumped over in the chair for approximately 2-5 minutes. Upon regaining consciousness he noticed redness and pins/needles tingling in his bilateral upper and lower extremities and felt nauseated. He states that he otherwise felt normal when he woke up this morning besides some acid reflux. Upon his arrival to the ED he notes complete resolution of his symptoms. Patient denies any alcohol or drug use in the last 24 hours. Patient denies headache, abdominal pain, vomiting, and all other complaints.     Allergies:  Cats      Medications:    Adderall XR  Omeprazole    Past Medical History:    Anxiety  Intervertebral Disc Protrusion  Lumbar Foraminal Stenosis  Obesity  GERD  ADHD    Past Surgical History:    History reviewed. No pertinent surgical history.     Family History:    Diabetes     Social History:  Presents with step dad   Tobacco use: current everyday smoker  Alcohol use: Yes  PCP: Susan Haase    Marital Status:  Single      Review of Systems   Gastrointestinal: Positive for nausea. Negative for abdominal pain and vomiting.   Skin: Positive for color change.   Neurological: Positive for syncope and numbness (pins and needles). Negative for headaches.   All other systems reviewed and are negative.    Physical Exam     Patient Vitals for the past 24 hrs:   BP Temp Temp src Pulse Heart Rate Resp SpO2   01/06/18 1615 (!) 139/93 - - - 71 - 100 %   01/06/18 " 1600 144/88 - - - 73 - 100 %   01/06/18 1545 134/80 - - - 79 - 99 %   01/06/18 1531 126/88 - - - 93 - 100 %   01/06/18 1530 137/83 - - - - - -   01/06/18 1528 134/81 - - - 80 - 100 %   01/06/18 1515 122/76 - - - 76 - 100 %   01/06/18 1500 126/77 - - - 87 - 100 %   01/06/18 1445 126/84 - - - 78 - 100 %   01/06/18 1430 - - - - 82 - 100 %   01/06/18 1404 142/85 97.5  F (36.4  C) Oral 72 - 16 100 %      Physical Exam  General: Alert, interactive in no distress. Blanching redness to bilateral upper extremities.   Head:  Scalp is atraumatic.  Eyes:  EOM intact. The pupils are equal, round, and reactive to light. No scleral icterus.    Neck:  Normal range of motion. There is no rigidity.   CV:  Regular rate and rhythm. No murmur. 2+ radial and DP pulses  Resp:  Breath sounds are clear bilaterally. Non-labored, no retractions or accessory muscle use.  GI:  Abdomen is soft, no distension, no tenderness.   MS:  Normal strength in all 4 extremities.   Skin:  Warm and dry, No rash or lesions noted.  Neuro:  Strength 5/5 x4. Sensation intact in all 4 extremities. GCS: 15  Psych:  Awake. Alert and orientedx3. Appropriate interactions.   Lymph: No anterior or posterior cervical lymphadenopathy noted.       Emergency Department Course     ECG (14:09:56):  Rate 83 bpm. LA interval 138. QRS duration 94. QT/QTc 362/425. P-R-T axes 67 65 37. Normal sinus rhythm. Normal ECG. Interpreted at 1414 by Merna Carias MD.     Laboratory:  CBC: WNL (WBC 17.6, HGB 17.6, )    BMP: WNL (Creatinine 0.94)   Troponin I: <0.015     Interventions:  Medications   lactated ringers BOLUS 1,000 mL (0 mLs Intravenous Stopped 1/6/18 1624)       Emergency Department Course:  Past medical records, nursing notes, and vitals reviewed.  1418: I performed an exam of the patient and obtained history, as documented above.  IV inserted and blood drawn.   Above interventions provided.   Orthostatics completed, plan on 1 liter NS.  3:45 PM: Updated the  "patient and family on the findings thus far.   The patient was ambulated here in the emergency department without difficulty.   I personally reviewed the laboratory results with the Patient and step dad and answered all related questions prior to   Findings and plan explained to the Patient and father. Patient discharged home, status improved, with instructions regarding supportive care, medications, and reasons to return as well as the importance of close follow-up was reviewed. .        Impression & Plan    Medical Decision Making:  eRn Lawton is a 22 year old male hx of ADHD, GERD, and anxiety who presents with syncope. The patient reports prior to arrival while at work he felt \"pressure to [his] face\". He went to his boss who noted a red face and the patient reports feeling like he was going to faint and next thing he remembers was his boss lowering him to the ground. Here, he has no complaints and is alert&orientedx3. The differential for syncope is broad and includes etiologies such as cardiac arrhythmia, ACS, HOCM, PE, orthostatic hypotension, drugs, seizure, vasovagal.  Orthostacis here are positive with HR increase of 40 from lying to standing. He has not eaten anything today and I suspect vasovagal. Received 1L NS. Patient was alert immediantly afterwards and was able to talk to EMS. With no post-ictal state and lack of seizure-like activity I do not suspect seizure. In addition, there is no family history of sudden death, no chest pain, no murmur, no focal neurologic symptoms, and no complaints of concerning headache. The EKG was reviewed and showed no evidence of Brugada syndrome, hypertrophic cardiomyopathy, gonzalez-parkinson white, pericarditis or prolonged QTc syndrome. The workup here is negative and the physical exam is re-assurring. There are no signs of a concerning etiology for syncope at this point. Follow up with PCP in 3-5 days for continued symptoms or sooner if worsening. The patient was " ambulated here in the emergency department without difficulty. Return precautions discussed including headache, numbness, weakness, chest pain, shortness of breath, visual changes, or for other concerns.      Diagnosis:    ICD-10-CM    1. Syncope, unspecified syncope type R55        Disposition:  discharged to home      1/6/2018   [facility] EMERGENCY DEPARTMENT  Archana MAGALLANES am serving as a scribe at 2:18 PM on 1/6/2018 to document services personally performed by Meeta Gracia PA-C based on my observations and the provider's statements to me.       Meeta Gracia PA-C  01/06/18 8219

## 2018-01-06 NOTE — DISCHARGE INSTRUCTIONS
Discharge Instructions  Syncope    Syncope (fainting) is a sudden, short loss of consciousness (passing out spell). People will usually fall to the ground when they faint or slump over if seated.  People may also shake when this happens, and it can sometimes be difficult to tell the difference between syncope and a seizure. At this time, your provider does not find a reason to suspect that your fainting spell is a sign of anything dangerous or life-threatening.  However, sometimes the signs of serious illness do not show up right away.     Generally, every Emergency Department visit should have a follow-up clinic visit with either a primary or a specialty clinic/provider. Please follow-up as instructed by your emergency provider today.    Return to the Emergency Department if:    You faint again.     You have any significant bleeding.    You have chest pain or a fast or irregular heartbeat.    You feel short of breath.    You cough up any blood.    You have abdominal (belly) pain or unusual back pain.    You have ongoing vomiting (throwing up) or diarrhea (loose stools).    You have a black or tarry bowel movement, or blood in the stool or in your vomit.    You have a fever over 101 F.    You lose feeling or cannot move a part of your body or cannot talk normally.    You are confused, have a headache, cannot see well, or have a seizure.    DO NOT DRIVE. CALL 911 INSTEAD!    What can I do to help myself?    Follow any specific instructions that your provider discussed with you.    If you feel light-headed, make sure to sit down right away, even if you have to sit on the floor.    Follow up with your regular medical provider as discussed for further management. This may include lowering your blood pressure medications, insulin or other diabetic medications, checking your blood sugar more frequently, and drinking more fluids, taking medicines for vomiting or diarrhea or getting up slower.  If you were given a  prescription for medicine here today, be sure to read all of the information (including the package insert) that comes with your prescription.  This will include important information about the medicine, its side effects, and any warnings that you need to know about.  The pharmacist who fills the prescription can provide more information and answer questions you may have about the medicine.  If you have questions or concerns that the pharmacist cannot address, please call or return to the Emergency Department.   Remember that you can always come back to the Emergency Department if you are not able to see your regular provider in the amount of time listed above, if you get any new symptoms, or if there is anything that worries you.

## 2018-03-07 DIAGNOSIS — F90.9 ATTENTION DEFICIT HYPERACTIVITY DISORDER (ADHD), UNSPECIFIED ADHD TYPE: ICD-10-CM

## 2018-03-07 RX ORDER — DEXTROAMPHETAMINE SACCHARATE, AMPHETAMINE ASPARTATE MONOHYDRATE, DEXTROAMPHETAMINE SULFATE AND AMPHETAMINE SULFATE 7.5; 7.5; 7.5; 7.5 MG/1; MG/1; MG/1; MG/1
30 CAPSULE, EXTENDED RELEASE ORAL DAILY
Start: 2018-03-07

## 2018-03-07 RX ORDER — DEXTROAMPHETAMINE SACCHARATE, AMPHETAMINE ASPARTATE, DEXTROAMPHETAMINE SULFATE AND AMPHETAMINE SULFATE 2.5; 2.5; 2.5; 2.5 MG/1; MG/1; MG/1; MG/1
10 TABLET ORAL
Start: 2018-03-07

## 2018-03-07 NOTE — TELEPHONE ENCOUNTER
Controlled Substance Refill Request for amphetamine-dextroamphetamine (ADDERALL XR) 30 MG per 24 hr capsule  Problem List Complete:  Yes    Patient is followed by HAASE, SUSAN RACHELE for ongoing prescription of stimulants.  All refills should be approved by this provider, or covering partner.     Medication(s): adderall XR 30 mg every day. Adderall IR 10 mg every day  Maximum quantity per month: #30, #30  Clinic visit frequency required: Q 3 months      Controlled substance agreement on file: Yes  Neuropsych evaluation for ADD completed:  Yes, completed 8/22/2017, on file and diagnosis confirmed     Last [facility] website verification:  done on 9/8/2017    Last Written Prescription Date:  02/10/18  Last Fill Quantity: 30,   # refills: 0    Last Office Visit with [facility] primary care provider: 12/05/17    Clinic visit frequency required: Q 3 months     Future Office visit:     Controlled substance agreement on file: Yes:  Date 12/05/17.     Processing:  Patient will  in clinic   checked in past 6 months?  No, route to RN       Controlled Substance Refill Request for amphetamine-dextroamphetamine (ADDERALL) 10 MG per tablet  Problem List Complete:  Yes    Patient is followed by HAASE, SUSAN RACHELE for ongoing prescription of stimulants.  All refills should be approved by this provider, or covering partner.     Medication(s): adderall XR 30 mg every day. Adderall IR 10 mg every day  Maximum quantity per month: #30, #30  Clinic visit frequency required: Q 3 months      Controlled substance agreement on file: Yes  Neuropsych evaluation for ADD completed:  Yes, completed 8/22/2017, on file and diagnosis confirmed     Last [facility] website verification:  done on 9/8/2017     Last Written Prescription Date:  02/05/18  Last Fill Quantity: 30,   # refills: 0    Last Office Visit with [facility] primary care provider: 12/05/17    Clinic visit frequency required: Q 3 months     Future Office visit:     Controlled substance agreement  on file: Yes:  Date 12/05/17.     Processing:  Patient will  in clinic   checked in past 6 months?  No, route to RN

## 2018-03-07 NOTE — TELEPHONE ENCOUNTER
We called Ren.  12/5/17 visit note f/u 3 months telephone visit.  Agrees. Scheduled PV 3/8/18 5:30 PM.   Ramiro Brooks RN

## 2018-03-08 ENCOUNTER — VIRTUAL VISIT (OUTPATIENT)
Dept: FAMILY MEDICINE | Facility: CLINIC | Age: 23
End: 2018-03-08
Payer: COMMERCIAL

## 2018-03-08 DIAGNOSIS — F90.9 ATTENTION DEFICIT HYPERACTIVITY DISORDER (ADHD), UNSPECIFIED ADHD TYPE: Primary | ICD-10-CM

## 2018-03-08 PROCEDURE — 98966 PH1 ASSMT&MGMT NQHP 5-10: CPT | Performed by: NURSE PRACTITIONER

## 2018-03-08 NOTE — PROGRESS NOTES
"Ren Lawton is a 22 year old male who is being evaluated via a telephone visit.      The patient has been notified of following:     \"This telephone visit will be conducted via a call between you and your physician/provider. We have found that certain health care needs can be provided without the need for a physical exam.  This service lets us provide the care you need with a short phone conversation.  If a prescription is necessary we can send it directly to your pharmacy.  If lab work is needed we can place an order for that and you can then stop by our lab to have the test done at a later time.    We will bill your insurance company for this service.  Please check with your medical insurance if this type of visit is covered. You may be responsible for the cost of this type of visit if insurance coverage is denied.  The typical cost is $30 (10 min), $59 (11-20 min) and $85 (21-30 min).  Most often these visits are shorter than 10 minutes.    If during the course of the call the physician/provider feels a telephone visit is not appropriate, you will not be charged for this service.\"       Consent has been obtained for this service by care team member: yes.   See the scanned image in the medical record.    Ren Lawton complains of  ADHD follow up    I have reviewed and updated the patient's Past Medical History, Social History, Family History and Medication List.    ALLERGIES  Cats    Kira Kruger CMA   (MA signature)    Date of last ADHD office visit: 12/5/2017  Status since last visit: Improving  Taking controlled (daily) medications as prescribed: Yes                       Parent/Patient Concerns with Medications: focus   School:  Name of :Not in college at this time.      Sleep:denies difficulty falling or staying asleep.  Home/Family Concerns: None  Peer Concerns: None     Co-Morbid Diagnosis: None     Currently in counseling: No     Follow-up [place] completed: Criteria met for ADHD -  " "Inattentive     Medication Benefits:   Controlled symptoms: Hyperactivity - motor restlessness, Attention span, Distractability, Finishing tasks, Impulse control, Frustration tolerance, Accepting limits and Peer relations  Uncontrolled symptoms: None     Medication side effects:  Side effects noted: appetite suppression and weight loss  Denies: insomnia, tics, palpitations, stomach ache, headache, emotional lability, rebound irritability, drowsiness, \"zombie\" effect, growth suppression and dry mouth     Assessment/Plan:  Ren was seen today for recheck medication.    Diagnoses and all orders for this visit:    Attention deficit hyperactivity disorder (ADHD), unspecified ADHD type:  Urine in 12/2017 with THC, Ren reports that he did not receive the results from his urine drug test.  Informed him of the results, will not be able to refill his adderall without a negative repeat urine drug test.  Will return tomorrow for urine drug screen.  If negative will refill for 3 months, is positive will not longer be prescribed adderall by our clinic.   -     Drug abuse screen 8 urine (UR); Future    Follow up tomorrow for urine drug screen.  Will notify Ren of results.      I have reviewed the note as documented above.  This accurately captures the substance of my conversation with the patient,  Ren Lawton    Total time of call between patient and provider was 10 minutes     "

## 2018-03-08 NOTE — MR AVS SNAPSHOT
"              After Visit Summary   3/8/2018    Ren Lawton    MRN: [id number]           Patient Information     Date Of Birth          1995        Visit Information        Provider Department      3/8/2018 5:30 PM Haase, Susan Rachele, APRN CNP [facility]        Today's Diagnoses     Attention deficit hyperactivity disorder (ADHD), unspecified ADHD type    -  1       Follow-ups after your visit        Follow-up notes from your care team     Return in about 3 months (around 6/8/2018).      Future tests that were ordered for you today     Open Future Orders        Priority Expected Expires Ordered    Drug abuse screen 8 urine (UR) Routine  4/8/2018 3/8/2018            Who to contact     If you have questions or need follow up information about today's clinic visit or your schedule please contact [facility] directly at [phone].  Normal or non-critical lab and imaging results will be communicated to you by MyChart, letter or phone within 4 business days after the clinic has received the results. If you do not hear from us within 7 days, please contact the clinic through MyChart or phone. If you have a critical or abnormal lab result, we will notify you by phone as soon as possible.  Submit refill requests through [employer] or call your pharmacy and they will forward the refill request to us. Please allow 3 business days for your refill to be completed.          Additional Information About Your Visit        MyChart Information     [employer] lets you send messages to your doctor, view your test results, renew your prescriptions, schedule appointments and more. To sign up, go to [email or web address] . Click on \"Log in\" on the left side of the screen, which will take you to the Welcome page. Then click on \"Sign up Now\" on the right side of the page.     You will be asked to enter the access code listed below, as well as some personal information. Please follow the " directions to create your username and password.     Your access code is: [email or web address]  Expires: 2018  4:25 PM     Your access code will  in 90 days. If you need help or a new code, please call your [place] clinic or [phone].        Care EveryWhere ID     This is your Care EveryWhere ID. This could be used by other organizations to access your [place] medical records  [id number]         Blood Pressure from Last 3 Encounters:   18 137/85   17 130/70   17 152/80    Weight from Last 3 Encounters:   17 208 lb (94.3 kg)   17 215 lb (97.5 kg) (96 %)*   17 208 lb (94.3 kg) (95 %)*     * Growth percentiles are based on [facility] 2-20 Years data.               Primary Care Provider Office Phone # Fax #    Apurva Rachele Haase, APRN [phone] [address] [facility] [address]        Equal Access to Services     [facility] Center[facility] : Hadii tarik ku hadasho Sod[place]a, waaxda luqadaha, qaybta kaalmada martínez, puma gao . So [facility] [phone].    ATENCIÓN: Si habla español, tiene a foley disposición servicios gratuitos de asistencia lingüística. [facility] [phone].    We comply with applicable federal civil rights laws and [place] laws. We do not discriminate on the basis of race, color, national origin, age, disability, sex, sexual orientation, or gender identity.            Thank you!     Thank you for choosing [facility]  for your care. Our goal is always to provide you with excellent care. Hearing back from our patients is one way we can continue to improve our services. Please take a few minutes to complete the written survey that you may receive in the mail after your visit with us. Thank you!             Your Updated Medication List - Protect others around you: Learn how to safely use, store and throw away your medicines at www.disposemymeds.org.          This list is accurate as of 3/8/18  9:21 PM.   Always use your most recent med list.                   Brand Name Dispense Instructions for use Diagnosis    * amphetamine-dextroamphetamine 10 MG per tablet    ADDERALL    30 tablet    Take 1 tablet (10 mg) by mouth daily (with lunch)    Attention deficit hyperactivity disorder (ADHD), unspecified ADHD type       * amphetamine-dextroamphetamine 30 MG per 24 hr capsule    ADDERALL XR    30 capsule    Take 1 capsule (30 mg) by mouth daily    Attention deficit hyperactivity disorder (ADHD), unspecified ADHD type       benzoyl peroxide 10 % topical gel     56 g    Apply topically At Bedtime    Other acne       * Notice:  This list has 2 medication(s) that are the same as other medications prescribed for you. Read the directions carefully, and ask your doctor or other care provider to review them with you.

## 2018-04-03 DIAGNOSIS — F90.9 ATTENTION DEFICIT HYPERACTIVITY DISORDER (ADHD), UNSPECIFIED ADHD TYPE: ICD-10-CM

## 2018-04-03 LAB
AMPHETAMINES UR QL SCN: NEGATIVE
BARBITURATES UR QL: NEGATIVE
BENZODIAZ UR QL: NEGATIVE
CANNABINOIDS UR QL SCN: NEGATIVE
COCAINE UR QL: NEGATIVE
ETHANOL UR QL SCN: NEGATIVE
OPIATES UR QL SCN: NEGATIVE
PCP UR QL SCN: NEGATIVE

## 2018-04-03 PROCEDURE — 80307 DRUG TEST PRSMV CHEM ANLYZR: CPT | Performed by: NURSE PRACTITIONER

## 2018-04-03 PROCEDURE — 80320 DRUG SCREEN QUANTALCOHOLS: CPT | Performed by: NURSE PRACTITIONER

## 2018-04-04 ENCOUNTER — TELEPHONE (OUTPATIENT)
Dept: FAMILY MEDICINE | Facility: CLINIC | Age: 23
End: 2018-04-04

## 2018-04-04 DIAGNOSIS — F90.9 ATTENTION DEFICIT HYPERACTIVITY DISORDER (ADHD), UNSPECIFIED ADHD TYPE: ICD-10-CM

## 2018-04-04 RX ORDER — DEXTROAMPHETAMINE SACCHARATE, AMPHETAMINE ASPARTATE MONOHYDRATE, DEXTROAMPHETAMINE SULFATE AND AMPHETAMINE SULFATE 7.5; 7.5; 7.5; 7.5 MG/1; MG/1; MG/1; MG/1
30 CAPSULE, EXTENDED RELEASE ORAL DAILY
Qty: 30 CAPSULE | Refills: 0 | Status: SHIPPED | OUTPATIENT
Start: 2018-05-04 | End: 2018-04-04

## 2018-04-04 RX ORDER — DEXTROAMPHETAMINE SACCHARATE, AMPHETAMINE ASPARTATE, DEXTROAMPHETAMINE SULFATE AND AMPHETAMINE SULFATE 2.5; 2.5; 2.5; 2.5 MG/1; MG/1; MG/1; MG/1
10 TABLET ORAL
Qty: 30 TABLET | Refills: 0 | Status: SHIPPED | OUTPATIENT
Start: 2018-04-04 | End: 2018-04-04

## 2018-04-04 RX ORDER — DEXTROAMPHETAMINE SACCHARATE, AMPHETAMINE ASPARTATE MONOHYDRATE, DEXTROAMPHETAMINE SULFATE AND AMPHETAMINE SULFATE 7.5; 7.5; 7.5; 7.5 MG/1; MG/1; MG/1; MG/1
30 CAPSULE, EXTENDED RELEASE ORAL DAILY
Qty: 30 CAPSULE | Refills: 0 | Status: SHIPPED | OUTPATIENT
Start: 2018-04-04 | End: 2018-04-04

## 2018-04-04 RX ORDER — DEXTROAMPHETAMINE SACCHARATE, AMPHETAMINE ASPARTATE, DEXTROAMPHETAMINE SULFATE AND AMPHETAMINE SULFATE 2.5; 2.5; 2.5; 2.5 MG/1; MG/1; MG/1; MG/1
10 TABLET ORAL
Qty: 30 TABLET | Refills: 0 | Status: SHIPPED | OUTPATIENT
Start: 2018-06-04 | End: 2018-07-10

## 2018-04-04 RX ORDER — DEXTROAMPHETAMINE SACCHARATE, AMPHETAMINE ASPARTATE MONOHYDRATE, DEXTROAMPHETAMINE SULFATE AND AMPHETAMINE SULFATE 7.5; 7.5; 7.5; 7.5 MG/1; MG/1; MG/1; MG/1
30 CAPSULE, EXTENDED RELEASE ORAL DAILY
Qty: 30 CAPSULE | Refills: 0 | Status: SHIPPED | OUTPATIENT
Start: 2018-06-04 | End: 2018-07-10

## 2018-04-04 RX ORDER — DEXTROAMPHETAMINE SACCHARATE, AMPHETAMINE ASPARTATE, DEXTROAMPHETAMINE SULFATE AND AMPHETAMINE SULFATE 2.5; 2.5; 2.5; 2.5 MG/1; MG/1; MG/1; MG/1
10 TABLET ORAL
Qty: 30 TABLET | Refills: 0 | Status: SHIPPED | OUTPATIENT
Start: 2018-05-04 | End: 2018-04-04

## 2018-04-04 NOTE — TELEPHONE ENCOUNTER
Can you let Ren know that his urine drug screen is negative.  I have printed 3 months of prescriptions for him to .  I will need to see him back in clinic (not a phone visit) in 3 months for further refills.  Also let him know that if any future drug screens are positive I will not longer prescribe ADHD medications for him.  Thanks,  Susan Haase, CNP

## 2018-04-17 ENCOUNTER — HOSPITAL ENCOUNTER (OUTPATIENT)
Dept: MRI IMAGING | Facility: CLINIC | Age: 23
Discharge: HOME | End: 2018-04-17
Attending: NURSE PRACTITIONER | Admitting: NURSE PRACTITIONER
Payer: COMMERCIAL

## 2018-04-17 ENCOUNTER — OFFICE VISIT (OUTPATIENT)
Dept: FAMILY MEDICINE | Facility: CLINIC | Age: 23
End: 2018-04-17
Payer: COMMERCIAL

## 2018-04-17 VITALS
WEIGHT: 195 LBS | RESPIRATION RATE: 14 BRPM | SYSTOLIC BLOOD PRESSURE: 122 MMHG | HEART RATE: 82 BPM | BODY MASS INDEX: 27.2 KG/M2 | OXYGEN SATURATION: 100 % | DIASTOLIC BLOOD PRESSURE: 80 MMHG | TEMPERATURE: 99 F

## 2018-04-17 DIAGNOSIS — M48.061 LUMBAR FORAMINAL STENOSIS: ICD-10-CM

## 2018-04-17 DIAGNOSIS — F90.9 ATTENTION DEFICIT HYPERACTIVITY DISORDER (ADHD), UNSPECIFIED ADHD TYPE: ICD-10-CM

## 2018-04-17 DIAGNOSIS — F41.9 ANXIETY: ICD-10-CM

## 2018-04-17 DIAGNOSIS — M48.061 LUMBAR FORAMINAL STENOSIS: Primary | ICD-10-CM

## 2018-04-17 PROCEDURE — 72148 MRI LUMBAR SPINE W/O DYE: CPT

## 2018-04-17 PROCEDURE — 99000 SPECIMEN HANDLING OFFICE-LAB: CPT | Performed by: NURSE PRACTITIONER

## 2018-04-17 PROCEDURE — 99214 OFFICE O/P EST MOD 30 MIN: CPT | Performed by: NURSE PRACTITIONER

## 2018-04-17 PROCEDURE — 80307 DRUG TEST PRSMV CHEM ANLYZR: CPT | Mod: 90 | Performed by: NURSE PRACTITIONER

## 2018-04-17 NOTE — MR AVS SNAPSHOT
"              After Visit Summary   4/17/2018    Ren Lawton    MRN: [id number]           Patient Information     Date Of Birth          1995        Visit Information        Provider Department      4/17/2018 10:15 AM Haase, Susan Rachele, APRN CNP [facility]        Today's Diagnoses     Lumbar foraminal stenosis    -  1    Attention deficit hyperactivity disorder (ADHD), unspecified ADHD type           Follow-ups after your visit        Future tests that were ordered for you today     Open Future Orders        Priority Expected Expires Ordered    MR Lumbar Spine w/o Contrast Routine  4/17/2019 4/17/2018            Who to contact     If you have questions or need follow up information about today's clinic visit or your schedule please contact [facility] directly at [phone].  Normal or non-critical lab and imaging results will be communicated to you by MyChart, letter or phone within 4 business days after the clinic has received the results. If you do not hear from us within 7 days, please contact the clinic through MyChart or phone. If you have a critical or abnormal lab result, we will notify you by phone as soon as possible.  Submit refill requests through [employer] or call your pharmacy and they will forward the refill request to us. Please allow 3 business days for your refill to be completed.          Additional Information About Your Visit        [employer] Information     [employer] lets you send messages to your doctor, view your test results, renew your prescriptions, schedule appointments and more. To sign up, go to [email or web address] . Click on \"Log in\" on the left side of the screen, which will take you to the Welcome page. Then click on \"Sign up Now\" on the right side of the page.     You will be asked to enter the access code listed below, as well as some personal information. Please follow the directions to create your username and password.     Your " access code is: [email or web address]  Expires: 2018 10:53 AM     Your access code will  in 90 days. If you need help or a new code, please call your [place] clinic or [phone].        Care EveryWhere ID     This is your Care EveryWhere ID. This could be used by other organizations to access your [place] medical records  [id number]        Your Vitals Were     Pulse Temperature Respirations Pulse Oximetry BMI (Body Mass Index)       82 99  F (37.2  C) (Oral) 14 100% 27.2 kg/m2        Blood Pressure from Last 3 Encounters:   18 122/80   18 137/85   17 130/70    Weight from Last 3 Encounters:   18 195 lb (88.5 kg)   17 208 lb (94.3 kg)   17 215 lb (97.5 kg) (96 %)*     * Growth percentiles are based on [facility] 2-20 Years data.              We Performed the Following     Drug  Screen Comprehensive , Urine with Reported Meds (MedTox) (Pain Care Package)        Primary Care Provider Office Phone # Fax #    Susan Rachele Haase, APRN [phone] [address] [facility] [address]        Equal Access to Services     KLARISSA HALE : Hadii tarik ku hadasho Soomaali, waaxda luqadaha, qaybta kaalmada adeegyada, waxay connie hayjay gao . So [facility] [phone].    ATENCIÓN: Si habla español, tiene a foley disposición servicios gratuitos de asistencia lingüística. Llame al [phone].    We comply with applicable federal civil rights laws and [place] laws. We do not discriminate on the basis of race, color, national origin, age, disability, sex, sexual orientation, or gender identity.            Thank you!     Thank you for choosing [facility]  for your care. Our goal is always to provide you with excellent care. Hearing back from our patients is one way we can continue to improve our services. Please take a few minutes to complete the written survey that you may receive in the mail after your visit with us. Thank you!              Your Updated Medication List - Protect others around you: Learn how to safely use, store and throw away your medicines at www.disposemymeds.org.          This list is accurate as of 4/17/18 10:53 AM.  Always use your most recent med list.                   Brand Name Dispense Instructions for use Diagnosis    * amphetamine-dextroamphetamine 30 MG per 24 hr capsule   Start taking on:  6/4/2018    ADDERALL XR    30 capsule    Take 1 capsule (30 mg) by mouth daily    Attention deficit hyperactivity disorder (ADHD), unspecified ADHD type       * amphetamine-dextroamphetamine 10 MG per tablet   Start taking on:  6/4/2018    ADDERALL    30 tablet    Take 1 tablet (10 mg) by mouth daily (with lunch)    Attention deficit hyperactivity disorder (ADHD), unspecified ADHD type       benzoyl peroxide 10 % topical gel     56 g    Apply topically At Bedtime    Other acne       * Notice:  This list has 2 medication(s) that are the same as other medications prescribed for you. Read the directions carefully, and ask your doctor or other care provider to review them with you.

## 2018-04-17 NOTE — PROGRESS NOTES
"  SUBJECTIVE:   Ren Lawton is a 22 year old male who presents to clinic today for the following health issues:    Musculoskeletal problem/pain      Duration: 4 days    Description  Location: back and neck pain    Intensity:  mild    Accompanying signs and symptoms: numbness and trouble concentrating    History  Previous similar problem: YES  Previous evaluation:  none    Precipitating or alleviating factors:  Trauma or overuse: no   Aggravating factors include: none    Therapies tried and outcome: heat, ice, chiropractor and Ibuprofen    MS: Ren has been having significant back pain for the past four days. He has gone to the chiropractor with no relief. He has also tried taking Ibuprofen and using ice and heat. He currently rates the pain as 2/10. He feels like his \"spine is not aligned\". He complains of numbness in his lower back, and numbness/tingling in his left foot when he starts to move it. He has not been feeling the urge to urinate or stool. Denies urinary or bowel incontinence.    Neuro: Ren reports that he has been feeling numb for the past four days. He reports that he has been, \"feeling like nothing. I can't feel or smell or taste, no urgency to use the bathroom or pass gas\". He also notes that has been having problems tasting and smelling. \"Right now I can taste these mints in my mouth, but not entirely\". He also notes that his eyes also don't seem to focus together. He has also been struggling to focus at work.He has not been sleeping more than usual, but he is more fatigued. He last took Adderall yesterday, and hasn't taken any yet today.    Vision: He has an eye doctor appointment later today.    Anxiety/Depression: Ren reports that he doesn't feel depressed. His mother notes that he has been more irritable due to his pain, but    Drugs/Alcohol: He reports that he went out to a bar this past weekend. He does not use drugs.      Problem list and histories reviewed & adjusted, as " indicated.  Additional history: as documented    Reviewed and updated as needed this visit by clinical staff  Tobacco  Allergies  Meds  Med Hx  Surg Hx  Fam Hx  Soc Hx      Reviewed and updated as needed this visit by Provider       ROS:  Constitutional, cardiovascular, pulmonary, musculoskeletal, neuro, skin, endocrine and psych systems are negative, except as otherwise noted.    This document serves as a record of the services and decisions personally performed and made by Susan Haase, CNP. It was created on her behalf by Raphael Miller, a trained medical scribe. The creation of this document is based the provider's statements to the medical scribe.  Raphael Miller April 17, 2018 10:36 AM      OBJECTIVE:     /80 (BP Location: Left arm, Patient Position: Chair, Cuff Size: Adult Large)  Pulse 82  Temp 99  F (37.2  C) (Oral)  Resp 14  Wt 88.5 kg (195 lb)  SpO2 100%  BMI 27.2 kg/m2  Body mass index is 27.2 kg/(m^2).  GENERAL: healthy, alert and no distress  RESP: lungs clear to auscultation - no rales, rhonchi or wheezes  CV: regular rate and rhythm, normal S1 S2, no S3 or S4, no murmur, click or rub, no peripheral edema SKIN: no suspicious lesions or rashes  MENTAL STATUS:  Alert, oriented x3.  Speech fluent with normal naming. CRANIAL NERVES:  Pupils are equal, round, reactive to light.  Extraocular movements full.  Visual fields full.  Facial sensation, movement normal.  Palate moves symmetrically.  Tongue midline.  Sternocleidomastoid and trapezius strength intact.  Neck strength was normal.    NEUROLOGIC:  Motor 5/5.  Reflexes 2/4.  Good finger-nose-finger, romberg negative.  Normal heel to toe gait without ataxia.  Negative straight leg raise.  MS: left para lumbar spinal tenderness upon palpation, increased pain with flexion and lateral rotation.  Cervical spine with full flexion, hyperextension and rotation. No gross musculoskeletal defects noted, no edema  PSYCH: mentation appears normal, affect  normal/bright      ASSESSMENT/PLAN:   Ren was seen today for musculoskeletal problem.    Diagnoses and all orders for this visit:    Lumbar foraminal stenosis:  With symptoms of LLE paresthesia will obtain an MRI for further evaluation, will be done today at noon, will call with results and discuss results.   -     MR Lumbar Spine w/o Contrast; Future    Attention deficit hyperactivity disorder (ADHD), unspecified ADHD type:  Unclear etiology of neuro complaints (anosmia, vision disturbance, foggy thought process).  Neuro exam normal. Discussed stopping adderall to see if symptoms resolve. May need follow up with neurology if symptoms persist.   -     Drug  Screen Comprehensive , Urine with Reported Meds (MedTox) (Pain Care Package)    Anxiety: well controlled.    Follow up in 1-2 days, sooner as needed.    The information in this document, created by the me     accurately reflects the services I personally performed and the decisions made by me. I have reviewed and approved this document for accuracy.   Susan Haase, CNP Susan Haase, APRN CNP  [facility]

## 2018-04-17 NOTE — LETTER
April 21, 2018      Ren Lawton  [address] [address]        Dear ,    We are writing to inform you of your test results.    Your test results fall within the expected range(s) or remain unchanged from previous results.  Please continue with current treatment plan.    Resulted Orders   Drug  Screen Comprehensive , Urine with Reported Meds (MedTox) (Pain Care Package)   Result Value Ref Range    Pain Drug SCR UR W RPTD Meds FINAL       Comment:      (Note)  ====================================================================  [facility] COMP DRUG ANALYSIS,UR  ====================================================================  Test                             Result       Flag       Units        Drug Present and Declared for Prescription Verification   Amphetamine                    >3436        EXPECTED   ng/mg creat    Amphetamine is available as a schedule II prescription drug.  Drug Present not Declared for Prescription Verification   Carboxy-THC                    120          UNEXPECTED ng/mg creat    Carboxy-THC is a metabolite of tetrahydrocannabinol  (THC).    Source of THC is most commonly illicit, but THC is also present    in a scheduled prescription medication.   Ephedrine/Pseudoephedrine      PRESENT      UNEXPECTED               Phenylpropanolamine            PRESENT      UNEXPECTED                Source of ephedrine/pseudoephedrine is most commonly    pseudoephedrine in over-the-counter or prescription cold and      allergy medications. Phenylpropanolamine is an expected    metabolite of ephedrine/pseudoephedrine.   Ibuprofen                      PRESENT      UNEXPECTED              ====================================================================  Test                      Result    Flag   Units      Ref Range        Creatinine              291              mg/dL      >=20             ====================================================================  Declared Medications:  The flagging and interpretation on this report are based on the  following declared medications.  Unexpected results may arise from  inaccuracies in the declared medications.  **Note: The testing scope of this panel includes these medications:  Amphetamine (Adderall)  ====================================================================  For clinical consultation, please call [phone].  ====================================================================  Analysis performed by [facility] [place] [address]  [id number]         If you have any questions or concerns, please call the clinic at the number listed above.       Sincerely,        Susan Haase, APRN CNP

## 2018-04-19 ENCOUNTER — TELEPHONE (OUTPATIENT)
Dept: FAMILY MEDICINE | Facility: CLINIC | Age: 23
End: 2018-04-19

## 2018-04-19 DIAGNOSIS — M48.061 LUMBAR FORAMINAL STENOSIS: Primary | ICD-10-CM

## 2018-04-19 NOTE — TELEPHONE ENCOUNTER
Spoke with Ren about his MRI results, continues to have dull pain, denies bowel/bladder incontinence.  Will refer to ortho for further evaluation, referral sent.   Susan Haase, CNP

## 2018-04-20 LAB — PAIN DRUG SCR UR W RPTD MEDS: NORMAL

## 2018-04-25 ENCOUNTER — OFFICE VISIT (OUTPATIENT)
Dept: NEUROSURGERY | Facility: CLINIC | Age: 23
End: 2018-04-25
Attending: NEUROLOGICAL SURGERY
Payer: COMMERCIAL

## 2018-04-25 VITALS
WEIGHT: 195 LBS | DIASTOLIC BLOOD PRESSURE: 76 MMHG | HEART RATE: 69 BPM | BODY MASS INDEX: 27.3 KG/M2 | SYSTOLIC BLOOD PRESSURE: 119 MMHG | HEIGHT: 71 IN | OXYGEN SATURATION: 99 %

## 2018-04-25 DIAGNOSIS — M54.16 LUMBAR RADICULOPATHY: Primary | ICD-10-CM

## 2018-04-25 PROCEDURE — 99244 OFF/OP CNSLTJ NEW/EST MOD 40: CPT | Mod: 57 | Performed by: NEUROLOGICAL SURGERY

## 2018-04-25 PROCEDURE — G0463 HOSPITAL OUTPT CLINIC VISIT: HCPCS | Performed by: NEUROLOGICAL SURGERY

## 2018-04-25 ASSESSMENT — PAIN SCALES - GENERAL: PAINLEVEL: SEVERE PAIN (7)

## 2018-04-25 NOTE — NURSING NOTE
"Ren Lawton is a 22 year old male who presents for:  Chief Complaint   Patient presents with     Neurologic Problem     Low back pain, left leg numbness, weakness, and tingling         Initial Vitals:  /76 (BP Location: Right arm, Patient Position: Sitting, Cuff Size: Adult Large)  Pulse 69  Ht 5' 11\" (1.803 m)  Wt 195 lb (88.5 kg)  SpO2 99%  BMI 27.2 kg/m2 Estimated body mass index is 27.2 kg/(m^2) as calculated from the following:    Height as of this encounter: 5' 11\" (1.803 m).    Weight as of this encounter: 195 lb (88.5 kg).. Body surface area is 2.11 meters squared. BP completed using cuff size: large  Severe Pain (7)    Do you feel safe in your environment?  Yes  Do you need any refills today? No    Nursing Comments: Low back pain, left leg numbness, weakness, and tingling.        5 min. nursing intake time  Roxanna Schaefer MA       Discharge plan: Patient Instructions  Pre-Operative:  -Surgery scheduled at [facility] for left L5-S1 Microdiscectomy  -Surgical risks: blood clots in the leg or lung, problems urinating, nerve damage, drainage from the incision, infection,stiffness  - Pre-operative physical with primary care physician within 30 days of surgical date.   - Likely same day procedure with discharge home day of surgery, may stay for 23 hour observation hospitalization for monitoring.     -Shower procedure: please shower with antimicrobial soap the night before surgery and morning of surgery. Please refer to showering instruction sheet in folder.  -Stop all solid foods 8 hours before surgery.  -Keep drinking clear liquids until 4 hours before surgery. Clear liquids include water, clear juice, black coffee, or clear tea without milk, Gatorade, clear soda.   - Discontinue Aspirin, NSAIDs (Advil/Ibuprofen, Naproxen,Nuprin,Relafen/Nabumetone, Diclofenac,Meloxicam, Aleve, Celebrex) x 7 days prior to surgical date.   - May try tylenol for pain 1000 mg three times per day for " pain      Post-Operative:  -you may resume taking NSAIDs 7 days post op   - Post operative incisional pain x 1-2 weeks which will require pain medications and muscle relaxants. You will receive medication upon discharge.  -Do NOT drive while taking narcotic pain medication.  -Do not drink alcohol while using any pain medication  -Post operative incision care- Watch for signs of infection: redness, swelling, warmth, drainage, and fever of 101 degrees or higher. Notify clinic [phone].  -No submerging incision in water such as pools, hot tubs,baths for at least 8 weeks or until incision is healed. Showers are fine.   - Post operative activity limitations: 6-8 weeks, no lifting > 10 pounds, no bending, twisting, or overhead reaching.  - Post op follow up appointments:6 week post op follow up visit with Nurse practitioner.Please call to schedule follow up appointment at [phone].  -If you are currently employed, you will need to be off work for 2-4 weeks for post op recovery and healing.   2 min. nursing discharge time  Roxanna Schaefer MA

## 2018-04-25 NOTE — PROGRESS NOTES
Neurosurgery Spine Consult [facility]      CC: Left lower extremity radiculopathy    Primary care Provider: Haase, Susan Rachele    I was aked to see the patient by;  Susan Rachele Haase, APRN CNP  [address] [place] [address]      [place]: Ren Lawton is a 22 year old male that presents to clinic with a complaint of left lower extremity radiculopathy in the S1 distribution.  Patient states this initially started in 2015 while playing football.  He had an exacerbation recently where he heard a pop in his back pain going down his left lower leg.  He has tried physical therapy and epidural steroid injections without resolution.      Past Medical History:   Diagnosis Date     LBP (low back pain) 2/5/2015       History reviewed. No pertinent surgical history.    Current Outpatient Prescriptions   Medication     [START ON 6/4/2018] amphetamine-dextroamphetamine (ADDERALL XR) 30 MG per 24 hr capsule     [START ON 6/4/2018] amphetamine-dextroamphetamine (ADDERALL) 10 MG per tablet     benzoyl peroxide 10 % topical gel     No current facility-administered medications for this visit.        Allergies   Allergen Reactions     Cats        Social History     Social History     Marital status: Single     Spouse name: N/A     Number of children: N/A     Years of education: N/A     Social History Main Topics     Smoking status: Current Every Day Smoker     Smokeless tobacco: Never Used     Alcohol use Yes     Drug use: No     Sexual activity: Yes     Other Topics Concern     None     Social History Narrative       Family History   Problem Relation Age of Onset     DIABETES Mother      gestational diabetes     DIABETES Paternal Grandmother          Review Of Systems  Skin: negative  Eyes: negative  Ears/Nose/Throat: negative  Respiratory: No shortness of breath, dyspnea on exertion, cough, or hemoptysis  Cardiovascular: negative  Gastrointestinal: negative  Genitourinary:  negative  Musculoskeletal: as above and back pain  Neurologic: as above  Psychiatric: negative  Hematologic/Lymphatic/Immunologic: negative  Endocrine: negative    B/P: Data Unavailable, T: Data Unavailable, P: Data Unavailable, R: Data Unavailable    Examination:  Normal affect and mood  No obvious labored respiration  No skin lesions noted   No obvious pitting edema  No abnormal psychiatric behavior  No obvious musculoskeletal abnormalities   Awake  Alert  Oriented x 3  Speech clear  Cranial nerves II - XII intact  Face symmetric  Back nontender  Limited ROM of back  Motor exam is limited by left lower extremity radicular pain  Sensation decreased in the left S1 distribution  Clonus -negative  DTR 1+  Positive lase'rajesh's sign on the left 20   Ambulation able to heel and toe walk    Imaging:   MRI lumbar spine shows L5-S1 degenerative disc disease with a very large left sided herniated disc with compression of the left S1 root        Diagnosis:  22-year-old male with left L5-S1 large herniated disc with left lower extremity radicular      Assessment/Plan:   I recommended a left L5-S1 microdiscectomy. I discussed with the patient and family the risk of surgery to include, but, not be limited to; nerve injury, failure of improvement of symptoms, CSF leak, infection, post op hematoma, the need for recurrent surgery, paralysis, coma and death         Jus Benitez MD, MS, FAANS  Neurosurgeon  [facility]  [address], [address]  [place] [id number] [phone]

## 2018-04-25 NOTE — LETTER
4/25/2018         RE: Ren Lawton  [address] [facility] [address]        Dear Colleague,    Thank you for referring your patient, Ren Lawton, to the [facility]. Please see a copy of my visit note below.    Neurosurgery Spine Consult [facility]      CC: Left lower extremity radiculopathy    Primary care Provider: Haase, Susan Rachele    I was aked to see the patient by;  Susan Rachele Haase, APRN CNP  [address] [place] [address]      [place]: Ren Lawton is a 22 year old male that presents to clinic with a complaint of left lower extremity radiculopathy in the S1 distribution.  Patient states this initially started in 2015 while playing football.  He had an exacerbation recently where he heard a pop in his back pain going down his left lower leg.  He has tried physical therapy and epidural steroid injections without resolution.      Past Medical History:   Diagnosis Date     LBP (low back pain) 2/5/2015       History reviewed. No pertinent surgical history.    Current Outpatient Prescriptions   Medication     [START ON 6/4/2018] amphetamine-dextroamphetamine (ADDERALL XR) 30 MG per 24 hr capsule     [START ON 6/4/2018] amphetamine-dextroamphetamine (ADDERALL) 10 MG per tablet     benzoyl peroxide 10 % topical gel     No current facility-administered medications for this visit.        Allergies   Allergen Reactions     Cats        Social History     Social History     Marital status: Single     Spouse name: N/A     Number of children: N/A     Years of education: N/A     Social History Main Topics     Smoking status: Current Every Day Smoker     Smokeless tobacco: Never Used     Alcohol use Yes     Drug use: No     Sexual activity: Yes     Other Topics Concern     None     Social History Narrative       Family History   Problem Relation Age of Onset     DIABETES Mother      gestational diabetes     DIABETES  Paternal Grandmother          Review Of Systems  Skin: negative  Eyes: negative  Ears/Nose/Throat: negative  Respiratory: No shortness of breath, dyspnea on exertion, cough, or hemoptysis  Cardiovascular: negative  Gastrointestinal: negative  Genitourinary: negative  Musculoskeletal: as above and back pain  Neurologic: as above  Psychiatric: negative  Hematologic/Lymphatic/Immunologic: negative  Endocrine: negative    B/P: Data Unavailable, T: Data Unavailable, P: Data Unavailable, R: Data Unavailable    Examination:  Normal affect and mood  No obvious labored respiration  No skin lesions noted   No obvious pitting edema  No abnormal psychiatric behavior  No obvious musculoskeletal abnormalities   Awake  Alert  Oriented x 3  Speech clear  Cranial nerves II - XII intact  Face symmetric  Back nontender  Limited ROM of back  Motor exam is limited by left lower extremity radicular pain  Sensation decreased in the left S1 distribution  Clonus -negative  DTR 1+  Positive lase'rajesh's sign on the left 20   Ambulation able to heel and toe walk    Imaging:   MRI lumbar spine shows L5-S1 degenerative disc disease with a very large left sided herniated disc with compression of the left S1 root        Diagnosis:  22-year-old male with left L5-S1 large herniated disc with left lower extremity radicular      Assessment/Plan:   I recommended a left L5-S1 microdiscectomy. I discussed with the patient and family the risk of surgery to include, but, not be limited to; nerve injury, failure of improvement of symptoms, CSF leak, infection, post op hematoma, the need for recurrent surgery, paralysis, coma and death         Jus Benitez MD, MS, FAANS  Neurosurgeon  [facility]  [address], [address]  [place] [address]  [phone]    Again, thank you for allowing me to participate in the care of your patient.        Sincerely,        Jus Benitez MD

## 2018-04-25 NOTE — MR AVS SNAPSHOT
After Visit Summary   4/25/2018    Ren Lawton    MRN: [id number]           Patient Information     Date Of Birth          1995        Visit Information        Provider Department      4/25/2018 9:30 AM Jus Benitez MD [facility]        Care Instructions    Patient Instructions  Pre-Operative:  -Surgery scheduled at [facility] for left L5-S1 Microdiscectomy  -Surgical risks: blood clots in the leg or lung, problems urinating, nerve damage, drainage from the incision, infection,stiffness  - Pre-operative physical with primary care physician within 30 days of surgical date.   - Likely same day procedure with discharge home day of surgery, may stay for 23 hour observation hospitalization for monitoring.     -Shower procedure: please shower with antimicrobial soap the night before surgery and morning of surgery. Please refer to showering instruction sheet in folder.  -Stop all solid foods 8 hours before surgery.  -Keep drinking clear liquids until 4 hours before surgery. Clear liquids include water, clear juice, black coffee, or clear tea without milk, Gatorade, clear soda.   - Discontinue Aspirin, NSAIDs (Advil/Ibuprofen, Naproxen,Nuprin,Relafen/Nabumetone, Diclofenac,Meloxicam, Aleve, Celebrex) x 7 days prior to surgical date.   - May try tylenol for pain 1000 mg three times per day for pain      Post-Operative:  -you may resume taking NSAIDs 7 days post op   - Post operative incisional pain x 1-2 weeks which will require pain medications and muscle relaxants. You will receive medication upon discharge.  -Do NOT drive while taking narcotic pain medication.  -Do not drink alcohol while using any pain medication  -Post operative incision care- Watch for signs of infection: redness, swelling, warmth, drainage, and fever of 101 degrees or higher. Notify clinic [phone].  -No submerging incision in water such as pools, hot tubs,baths for at least  "8 weeks or until incision is healed. Showers are fine.   - Post operative activity limitations: 6-8 weeks, no lifting > 10 pounds, no bending, twisting, or overhead reaching.  - Post op follow up appointments:6 week post op follow up visit with Nurse practitioner.Please call to schedule follow up appointment at [phone].  -If you are currently employed, you will need to be off work for 2-4 weeks for post op recovery and healing.                   Follow-ups after your visit        Who to contact     If you have questions or need follow up information about today's clinic visit or your schedule please contact [facility] directly at [phone].  Normal or non-critical lab and imaging results will be communicated to you by [employer], letter or phone within 4 business days after the clinic has received the results. If you do not hear from us within 7 days, please contact the clinic through [employer] or phone. If you have a critical or abnormal lab result, we will notify you by phone as soon as possible.  Submit refill requests through [employer] or call your pharmacy and they will forward the refill request to us. Please allow 3 business days for your refill to be completed.          Additional Information About Your Visit        [employer] Information     [employer] lets you send messages to your doctor, view your test results, renew your prescriptions, schedule appointments and more. To sign up, go to [email or web address] . Click on \"Log in\" on the left side of the screen, which will take you to the Welcome page. Then click on \"Sign up Now\" on the right side of the page.     You will be asked to enter the access code listed below, as well as some personal information. Please follow the directions to create your username and password.     Your access code is: [email or web address]  Expires: 2018 10:53 AM     Your access code will  in 90 days. If you need help or a new code, please call your [facility] " clinic or [phone].        Care EveryWhere ID     This is your Care EveryWhere ID. This could be used by other organizations to access your [place] medical records  [id number]         Blood Pressure from Last 3 Encounters:   04/17/18 122/80   01/06/18 137/85   12/05/17 130/70    Weight from Last 3 Encounters:   04/17/18 195 lb (88.5 kg)   12/05/17 208 lb (94.3 kg)   08/08/17 215 lb (97.5 kg) (96 %)*     * Growth percentiles are based on [facility] 2-20 Years data.              Today, you had the following     No orders found for display       Primary Care Provider Office Phone # Fax #    Apurva Tran Haase, APRN [phone] [address] [facility] [address]        Equal Access to Services     [facility]: Hadii aad ku hadasho Soomaali, waaxda luqadaha, qaybta kaalmada adeegyada, puma rogelin hayjay gao . So [facility] [phone].    ATENCIÓN: Si habla español, tiene a foley disposición servicios gratuitos de asistencia lingüística. Llame al [phone].    We comply with applicable federal civil rights laws and [place] laws. We do not discriminate on the basis of race, color, national origin, age, disability, sex, sexual orientation, or gender identity.            Thank you!     Thank you for choosing [facility]  for your care. Our goal is always to provide you with excellent care. Hearing back from our patients is one way we can continue to improve our services. Please take a few minutes to complete the written survey that you may receive in the mail after your visit with us. Thank you!             Your Updated Medication List - Protect others around you: Learn how to safely use, store and throw away your medicines at www.disposemymeds.org.          This list is accurate as of 4/25/18  9:54 AM.  Always use your most recent med list.                   Brand Name Dispense Instructions for use Diagnosis    * amphetamine-dextroamphetamine 30 MG per 24 hr  capsule   Start taking on:  6/4/2018    ADDERALL XR    30 capsule    Take 1 capsule (30 mg) by mouth daily    Attention deficit hyperactivity disorder (ADHD), unspecified ADHD type       * amphetamine-dextroamphetamine 10 MG per tablet   Start taking on:  6/4/2018    ADDERALL    30 tablet    Take 1 tablet (10 mg) by mouth daily (with lunch)    Attention deficit hyperactivity disorder (ADHD), unspecified ADHD type       benzoyl peroxide 10 % topical gel     56 g    Apply topically At Bedtime    Other acne       * Notice:  This list has 2 medication(s) that are the same as other medications prescribed for you. Read the directions carefully, and ask your doctor or other care provider to review them with you.

## 2018-04-25 NOTE — NURSING NOTE
Pre-operative Education    Education included but not limited to:  - Pre-operative physical with primary care physician within 30 days of surgical date. Pre op to be completed at [facility] in [place].   - Pre-operative clearance (cardiology, hematology, etc).   - Discontinue Aspirin, NSAIDs, Diclofenac x 7 days prior to surgical date.  -May try tylenol for pain 1000 mg three times per day for pain  -you may resume taking NSAIDs 7 days post op   -Patient is a current smoker. Patient educated on the importance of smoking cessation and how smoking inhibits healing. Patient received handout of resources to quit smoking but declined at this time. Patient stated he has no intention of quitting. Patient was offered prescription for smoking cessation but declined.   -Forms to be completed: FMLA/STD 4 weeks  -Surgical risks: blood clots in the leg or lung, problems urinating, nerve damage, drainage from the incision, infection,stiffness  -Preparation timeline  - When to start NPO           -Special bathing procedure.Patient received Hibiclens and showering instruction sheet.   Hospital stay:  Checking in  The surgery itself   Recovery room  - Managing pain   - Likely same day procedure with discharge home day of surgery, may stay for 23 hour observation hospitalization for monitoring  - Post operative incisional pain x 1-2 weeks which will require pain medications and muscle relaxants.   -Do NOT drive or drink alcohol while taking narcotic pain medication.  -Post operative incision care-Keep your incision clean and dry at all times. OK to remove dressing on postop day 2. OK to shower on postop day 3 and allow water to run over incision, pat dry after shower. No bathing, swimming or submerging in water. Call immediately or come to ED if any drainage occurs, or you develop new pain. Watch for signs of infection: redness, swelling, warmth, drainage, and fever of 101 degrees or higher. Notify clinic.   - Post operative  activity limitations: 6-8 weeks, no lifting > 10 pounds, no bending, twisting, or overhead reaching.    - Post op follow up appointments: 6 weeks with Nurse Practitioner with X-ray prior. Please call to schedule follow up appointment at [phone].   - Spine Education book was also given to the patient for further review.      Patient verbalized understanding of above instructions. All questions were answered to the best of my ability and the patient's satisfaction. Patient advised to call with any additional questions or concerns.  Gali Avelar RN

## 2018-04-26 ENCOUNTER — TELEPHONE (OUTPATIENT)
Dept: NEUROSURGERY | Facility: CLINIC | Age: 23
End: 2018-04-26

## 2018-04-26 NOTE — TELEPHONE ENCOUNTER
Type of surgery: Left L5-S1 Microdiscectomy  Location of surgery: [facility]  Date and time of surgery: 05/14/2018 at 7:30am  Surgeon: Dr. Benitez  Pre-Op Appt Date: 05/07/2018  Post-Op Appt Date: 06/26/2018   Packet sent out: Yes  Pre-cert/Authorization completed:  Yes  Date: MARCELO Sterling on 4/26/2018 at 1:39 PM

## 2018-04-26 NOTE — TELEPHONE ENCOUNTER
REASON FOR CALL:  Called and left message on voicemail for patient to schedule surgery.    Detailed message can be left:  YES    Palomo Sterling on 4/26/2018 at 12:09 PM

## 2018-05-07 ENCOUNTER — OFFICE VISIT (OUTPATIENT)
Dept: FAMILY MEDICINE | Facility: CLINIC | Age: 23
End: 2018-05-07
Payer: COMMERCIAL

## 2018-05-07 VITALS
DIASTOLIC BLOOD PRESSURE: 68 MMHG | RESPIRATION RATE: 16 BRPM | WEIGHT: 199 LBS | BODY MASS INDEX: 28.49 KG/M2 | HEART RATE: 90 BPM | SYSTOLIC BLOOD PRESSURE: 132 MMHG | TEMPERATURE: 97.7 F | HEIGHT: 70 IN | OXYGEN SATURATION: 98 %

## 2018-05-07 DIAGNOSIS — Z01.818 PREOP GENERAL PHYSICAL EXAM: Primary | ICD-10-CM

## 2018-05-07 DIAGNOSIS — M48.061 LUMBAR FORAMINAL STENOSIS: ICD-10-CM

## 2018-05-07 LAB
BASOPHILS # BLD AUTO: 0 10E9/L (ref 0–0.2)
BASOPHILS NFR BLD AUTO: 0.3 %
DIFFERENTIAL METHOD BLD: NORMAL
EOSINOPHIL # BLD AUTO: 0.2 10E9/L (ref 0–0.7)
EOSINOPHIL NFR BLD AUTO: 2 %
ERYTHROCYTE [DISTWIDTH] IN BLOOD BY AUTOMATED COUNT: 12.7 % (ref 10–15)
HCT VFR BLD AUTO: 41.7 % (ref 40–53)
HGB BLD-MCNC: 14.2 G/DL (ref 13.3–17.7)
LYMPHOCYTES # BLD AUTO: 2.7 10E9/L (ref 0.8–5.3)
LYMPHOCYTES NFR BLD AUTO: 28.3 %
MCH RBC QN AUTO: 30.7 PG (ref 26.5–33)
MCHC RBC AUTO-ENTMCNC: 34.1 G/DL (ref 31.5–36.5)
MCV RBC AUTO: 90 FL (ref 78–100)
MONOCYTES # BLD AUTO: 0.8 10E9/L (ref 0–1.3)
MONOCYTES NFR BLD AUTO: 8.7 %
NEUTROPHILS # BLD AUTO: 5.7 10E9/L (ref 1.6–8.3)
NEUTROPHILS NFR BLD AUTO: 60.7 %
PLATELET # BLD AUTO: 221 10E9/L (ref 150–450)
RBC # BLD AUTO: 4.62 10E12/L (ref 4.4–5.9)
WBC # BLD AUTO: 9.4 10E9/L (ref 4–11)

## 2018-05-07 PROCEDURE — 80048 BASIC METABOLIC PNL TOTAL CA: CPT | Performed by: NURSE PRACTITIONER

## 2018-05-07 PROCEDURE — 99214 OFFICE O/P EST MOD 30 MIN: CPT | Performed by: NURSE PRACTITIONER

## 2018-05-07 PROCEDURE — 85025 COMPLETE CBC W/AUTO DIFF WBC: CPT | Performed by: NURSE PRACTITIONER

## 2018-05-07 PROCEDURE — 36415 COLL VENOUS BLD VENIPUNCTURE: CPT | Performed by: NURSE PRACTITIONER

## 2018-05-07 ASSESSMENT — PATIENT HEALTH QUESTIONNAIRE - PHQ9
SUM OF ALL RESPONSES TO PHQ QUESTIONS 1-9: 10
10. IF YOU CHECKED OFF ANY PROBLEMS, HOW DIFFICULT HAVE THESE PROBLEMS MADE IT FOR YOU TO DO YOUR WORK, TAKE CARE OF THINGS AT HOME, OR GET ALONG WITH OTHER PEOPLE: VERY DIFFICULT
SUM OF ALL RESPONSES TO PHQ QUESTIONS 1-9: 10

## 2018-05-07 NOTE — LETTER
May 8, 2018      Damion Gournoe  [address] [facility] [address]        Dear Travis Mcclure,    Your CBC (checks for anemia and infection) and metabolic panel (checks kidney, liver function) are normal.        Resulted Orders   CBC with platelets differential   Result Value Ref Range    WBC 9.4 4.0 - 11.0 10e9/L    RBC Count 4.62 4.4 - 5.9 10e12/L    Hemoglobin 14.2 13.3 - 17.7 g/dL    Hematocrit 41.7 40.0 - 53.0 %    MCV 90 78 - 100 fl    MCH 30.7 26.5 - 33.0 pg    MCHC 34.1 31.5 - 36.5 g/dL    RDW 12.7 10.0 - 15.0 %    Platelet Count 221 150 - 450 10e9/L    Diff Method Automated Method     % Neutrophils 60.7 %    % Lymphocytes 28.3 %    % Monocytes 8.7 %    % Eosinophils 2.0 %    % Basophils 0.3 %    Absolute Neutrophil 5.7 1.6 - 8.3 10e9/L    Absolute Lymphocytes 2.7 0.8 - 5.3 10e9/L    Absolute Monocytes 0.8 0.0 - 1.3 10e9/L    Absolute Eosinophils 0.2 0.0 - 0.7 10e9/L    Absolute Basophils 0.0 0.0 - 0.2 10e9/L   Basic metabolic panel   Result Value Ref Range    Sodium 141 133 - 144 mmol/L    Potassium 3.9 3.4 - 5.3 mmol/L    Chloride 106 94 - 109 mmol/L    Carbon Dioxide 25 20 - 32 mmol/L    Anion Gap 10 3 - 14 mmol/L    Glucose 103 (H) 70 - 99 mg/dL    Urea Nitrogen 20 7 - 30 mg/dL    Creatinine 0.96 0.66 - 1.25 mg/dL    GFR Estimate >90 >60 mL/min/1.7m2      Comment:      Non  GFR Calc    GFR Estimate If Black >90 >60 mL/min/1.7m2      Comment:       GFR Calc    Calcium 9.1 8.5 - 10.1 mg/dL       If you have any questions or concerns, please call the clinic at the number listed above.       Sincerely,        Susan Haase, APRN CNP

## 2018-05-07 NOTE — MR AVS SNAPSHOT
After Visit Summary   5/7/2018    Ren Lawton    MRN: [id number]           Patient Information     Date Of Birth          1995        Visit Information        Provider Department      5/7/2018 9:00 AM Haase, Susan Rachele, APRN CNP [facility]        Today's Diagnoses     Preop general physical exam    -  1      Care Instructions      Before Your Surgery      Call your surgeon if there is any change in your health. This includes signs of a cold or flu (such as a sore throat, runny nose, cough, rash or fever).    Do not smoke, drink alcohol or take over the counter medicine (unless your surgeon or primary care doctor tells you to) for the 24 hours before and after surgery.    If you take prescribed drugs: Follow your doctor s orders about which medicines to take and which to stop until after surgery.    Eating and drinking prior to surgery: follow the instructions from your surgeon    Take a shower or bath the night before surgery. Use the soap your surgeon gave you to gently clean your skin. If you do not have soap from your surgeon, use your regular soap. Do not shave or scrub the surgery site.  Wear clean pajamas and have clean sheets on your bed.           Follow-ups after your visit        Follow-up notes from your care team     Return in about 3 months (around 8/7/2018).      Your next 10 appointments already scheduled     May 14, 2018   Procedure with Jus Benitez MD   [facility] (--)    [address] [facility] [address]   [phone]            Jun 26, 2018 10:40 AM CDT   Return Visit with ANIRUDH Ocampo CNP   [facility] ([facility])    [address] [id number] [phone]              Who to contact     If you have questions or need follow up information about today's clinic visit or your schedule please contact [facility]  [facility] [place] directly at [phone].  Normal or non-critical lab and imaging results will be communicated to you by MyChart, letter or phone within 4 business days after the clinic has received the results. If you do not hear from us within 7 days, please contact the clinic through Linkoveryhart or phone. If you have a critical or abnormal lab result, we will notify you by phone as soon as possible.  Submit refill requests through CareLuLu or call your pharmacy and they will forward the refill request to us. Please allow 3 business days for your refill to be completed.          Additional Information About Your Visit        CareLuLu Information     CareLuLu lets you send messages to your doctor, view your test results, renew your prescriptions, schedule appointments and more. To sign up, go to [email or web address] . Click on \"Log in\" on the left side of the screen, which will take you to the Welcome page. Then click on \"Sign up Now\" on the right side of the page.     You will be asked to enter the access code listed below, as well as some personal information. Please follow the directions to create your username and password.     Your access code is: [email or web address]  Expires: 2018 10:53 AM     Your access code will  in 90 days. If you need help or a new code, please call your [facility] clinic or [phone].        Care EveryWhere ID     This is your Care EveryWhere ID. This could be used by other organizations to access your [facility] medical records  [id number]        Your Vitals Were     Pulse Temperature Respirations Height Pulse Oximetry BMI (Body Mass Index)    90 97.7  F (36.5  C) (Oral) 16 5' 10\" (1.778 m) 98% 28.55 kg/m2       Blood Pressure from Last 3 Encounters:   18 132/68   18 119/76   18 122/80    Weight from Last 3 Encounters:   18 199 lb (90.3 kg)   18 195 lb (88.5 kg)   18 195 lb (88.5 kg)              We Performed the Following     Basic metabolic panel     " CBC with platelets differential        Primary Care Provider Office Phone # Fax #    Susan Rachele Haase, APRN [phone] [address] [facility] [address]        Equal Access to Services     KLARISSA HALE : Hadorquidea benites lupeo Socristianeali, waaxda luqadaha, qaybta kaalmada adeegyada, waxap idiin brookn antonio cheatham lamarlyn beard. So [facility] [phone].    ATENCIÓN: Si habla español, tiene a foley disposición servicios gratuitos de asistencia lingüística. Llame al [phone].    We comply with applicable federal civil rights laws and [place] laws. We do not discriminate on the basis of race, color, national origin, age, disability, sex, sexual orientation, or gender identity.            Thank you!     Thank you for choosing [facility]  for your care. Our goal is always to provide you with excellent care. Hearing back from our patients is one way we can continue to improve our services. Please take a few minutes to complete the written survey that you may receive in the mail after your visit with us. Thank you!             Your Updated Medication List - Protect others around you: Learn how to safely use, store and throw away your medicines at www.disposemymeds.org.          This list is accurate as of 5/7/18  9:39 AM.  Always use your most recent med list.                   Brand Name Dispense Instructions for use Diagnosis    * amphetamine-dextroamphetamine 30 MG per 24 hr capsule   Start taking on:  6/4/2018    ADDERALL XR    30 capsule    Take 1 capsule (30 mg) by mouth daily    Attention deficit hyperactivity disorder (ADHD), unspecified ADHD type       * amphetamine-dextroamphetamine 10 MG per tablet   Start taking on:  6/4/2018    ADDERALL    30 tablet    Take 1 tablet (10 mg) by mouth daily (with lunch)    Attention deficit hyperactivity disorder (ADHD), unspecified ADHD type       benzoyl peroxide 10 % topical gel     56 g    Apply topically At Bedtime    Other acne        * Notice:  This list has 2 medication(s) that are the same as other medications prescribed for you. Read the directions carefully, and ask your doctor or other care provider to review them with you.

## 2018-05-07 NOTE — PROGRESS NOTES
[facility]  [address] [facility] [address]  [phone]  Dept: [phone]    PRE-OP EVALUATION:  Today's date: 2018    Ren Lawton (: 1995) presents for pre-operative evaluation assessment as requested by Dr. Jus Benitez.  He requires evaluation and anesthesia risk assessment prior to undergoing surgery/procedure for treatment of Discectomy Lumbar Posterior.    Fax number for surgical facility: [facility]  Primary Physician: Haase, Susan Rachele  Type of Anesthesia Anticipated: General    Patient has a Health Care Directive or Living Will:  NO    Preop Questions 2018   1.  Do you have a history of Heart attack, stroke, stent, coronary bypass surgery, or other heart surgery? No   2.  Do you ever have any pain or discomfort in your chest? No   3.  Do you have a history of  Heart Failure? No   4.   Are you troubled by shortness of breath when:  walking on a level surface, or up a slight hill, or at night? No   5.  Do you currently have a cold, bronchitis or other respiratory infection? No   6.  Do you have a cough, shortness of breath, or wheezing? No   7.  Do you sometimes get pains in the calves of your legs when you walk? No   8. Do you or anyone in your family have previous history of blood clots? No   9.  Do you or does anyone in your family have a serious bleeding problem such as prolonged bleeding following surgeries or cuts? No   10. Have you ever had problems with anemia or been told to take iron pills? No   11. Have you had any abnormal blood loss such as black, tarry or bloody stools? No   12. Have you ever had a blood transfusion? No   13. Have you or any of your relatives ever had problems with anesthesia? No   14. Do you have sleep apnea, excessive snoring or daytime drowsiness? YES - daytime drowsiness   15. Do you have any prosthetic heart valves? No   16. Do you have prosthetic joints? No         HPI:     HPI related  to upcoming procedure: Ren Lawton is a 22 year old male who presents to the clinic for a pre-op for a discectomy lumbar posterior. Accompanied by his mother. He reports feeling well, denies URI symptoms. Patient reports numbness, occasional pain of left lower back. Currently taking Adderall daily. Denies complications with past surgeries. Mother denies family history knowledge of complication with anesthesia.     MEDICAL HISTORY:     Patient Active Problem List    Diagnosis Date Noted     Attention deficit hyperactivity disorder (ADHD), unspecified ADHD type 09/08/2017     Priority: Medium     Patient is followed by HAASE, SUSAN RACHELE for ongoing prescription of stimulants.  All refills should be approved by this provider, or covering partner.    Medication(s): adderall XR 30 mg every day. Adderall IR 10 mg every day  Maximum quantity per month: #30, #30  Clinic visit frequency required: Q 3 months     Controlled substance agreement on file: Yes  Neuropsych evaluation for ADD completed:  Yes, completed 8/22/2017, on file and diagnosis confirmed    Last [facility] website verification:  done on 9/8/2017   [email or web address]         Anxiety 05/31/2017     Priority: Medium     Intervertebral disc protrusion 06/08/2015     Priority: Medium     Lumbar foraminal stenosis 06/08/2015     Priority: Medium     Obesity 06/08/2015     Priority: Medium     GERD (gastroesophageal reflux disease) 04/21/2011     Priority: Medium      Past Medical History:   Diagnosis Date     LBP (low back pain) 2/5/2015     History reviewed. No pertinent surgical history.  Current Outpatient Prescriptions   Medication Sig Dispense Refill     [START ON 6/4/2018] amphetamine-dextroamphetamine (ADDERALL XR) 30 MG per 24 hr capsule Take 1 capsule (30 mg) by mouth daily 30 capsule 0     [START ON 6/4/2018] amphetamine-dextroamphetamine (ADDERALL) 10 MG per tablet Take 1 tablet (10 mg) by mouth daily (with lunch) 30 tablet 0     benzoyl  "peroxide 10 % topical gel Apply topically At Bedtime 56 g 9     OTC products: None, except as noted above    Allergies   Allergen Reactions     Cats       Latex Allergy: NO    Social History   Substance Use Topics     Smoking status: Current Every Day Smoker     Smokeless tobacco: Never Used     Alcohol use Yes     History   Drug Use No       REVIEW OF SYSTEMS:   CONSTITUTIONAL: NEGATIVE for fever, chills, change in weight  INTEGUMENTARY/SKIN: NEGATIVE for worrisome rashes, moles or lesions  EYES: NEGATIVE for vision changes or irritation  ENT/MOUTH: NEGATIVE for ear, mouth and throat problems  RESP: NEGATIVE for significant cough or SOB  BREAST: NEGATIVE for masses, tenderness or discharge  CV: NEGATIVE for chest pain, palpitations or peripheral edema  GI: NEGATIVE for nausea, abdominal pain, heartburn, or change in bowel habits  : NEGATIVE for frequency, dysuria, or hematuria  MUSCULOSKELETAL: lower back pain, see HPI  NEURO: NEGATIVE for weakness, dizziness. See HPI  ENDOCRINE: NEGATIVE for temperature intolerance, skin/hair changes  HEME: NEGATIVE for bleeding problems  PSYCHIATRIC: NEGATIVE for changes in mood or affect    This document serves as a record of the services and decisions personally performed and made by Susan Haase, CNP. It was created on her behalf by Lolis Muhammad, a trained medical scribe. The creation of this document is based on the provider's statements to the medical scribe.  Lolis Muhammad 9:31 AM May 7, 2018  EXAM:   /68  Pulse 90  Temp 97.7  F (36.5  C) (Oral)  Resp 16  Ht 1.778 m (5' 10\")  Wt 90.3 kg (199 lb)  SpO2 98%  BMI 28.55 kg/m2   GENERAL APPEARANCE: healthy, alert and no distress  EYES: EOMI,  PERRL  HENT: ear canals and TM's normal and nose and mouth without ulcers or lesions  NECK: no adenopathy, no asymmetry, masses, or scars and thyroid normal to palpation  RESP: lungs clear to auscultation - no rales, rhonchi or wheezes  CV: regular rates and rhythm, normal S1 S2, " no S3 or S4 and no murmur, click or rub  ABDOMEN:  soft, nontender, no HSM or masses and bowel sounds normal  MS: left para lumbar spinal tenderness.    SKIN: no suspicious lesions or rashes  NEURO: Normal strength and tone, sensory exam grossly normal, mentation intact and speech normal  PSYCH: mentation appears normal. and affect normal/bright  LYMPHATICS: No cervical adenopathy    DIAGNOSTICS:   HGB:  14.2    IMPRESSION:   Reason for surgery/procedure: Discectomy lumbar posterior   Diagnosis/reason for consult: Pre - op     The proposed surgical procedure is considered INTERMEDIATE risk.    REVISED CARDIAC RISK INDEX  The patient has the following serious cardiovascular risks for perioperative complications such as (MI, PE, VFib and 3  AV Block):  No serious cardiac risks  INTERPRETATION: 0 risks: Class I (very low risk - 0.4% complication rate)    The patient has the following additional risks for perioperative complications:  No identified additional risks      ICD-10-CM    1. Preop general physical exam Z01.818        RECOMMENDATIONS:   Ren was seen today for pre-op exam.    Diagnoses and all orders for this visit:    Preop general physical exam  -     CBC with platelets differential  -     Basic metabolic panel    Lumbar foraminal stenosis    Approval given to proceed with proposed procedure, without further diagnostic evaluation  Is aware of NPO orders and need to refrain from taking NSAIDS, Aspirin prior to surgery.      The information in this document, created by the medical scribe for me, accurately reflects the services I personally performed and the decisions made by me. I have reviewed and approved this document for accuracy prior to leaving the patient care area.  May 7, 2018 9:34 AM  Signed Electronically by: Susan Haase, APRN CNP    Copy of this evaluation report is provided to requesting physician.    [facility] Preop Guidelines    Revised Cardiac Risk Index

## 2018-05-08 LAB
ANION GAP SERPL CALCULATED.3IONS-SCNC: 10 MMOL/L (ref 3–14)
BUN SERPL-MCNC: 20 MG/DL (ref 7–30)
CALCIUM SERPL-MCNC: 9.1 MG/DL (ref 8.5–10.1)
CHLORIDE SERPL-SCNC: 106 MMOL/L (ref 94–109)
CO2 SERPL-SCNC: 25 MMOL/L (ref 20–32)
CREAT SERPL-MCNC: 0.96 MG/DL (ref 0.66–1.25)
GFR SERPL CREATININE-BSD FRML MDRD: >90 ML/MIN/1.7M2
GLUCOSE SERPL-MCNC: 103 MG/DL (ref 70–99)
POTASSIUM SERPL-SCNC: 3.9 MMOL/L (ref 3.4–5.3)
SODIUM SERPL-SCNC: 141 MMOL/L (ref 133–144)

## 2018-05-08 RX ORDER — MULTIVITAMIN,THERAPEUTIC
1 TABLET ORAL DAILY
COMMUNITY
End: 2023-03-17

## 2018-05-08 ASSESSMENT — PATIENT HEALTH QUESTIONNAIRE - PHQ9: SUM OF ALL RESPONSES TO PHQ QUESTIONS 1-9: 10

## 2018-05-11 NOTE — H&P (VIEW-ONLY)
[facility]  [address] [address] [address] [facility] [address]  [phone]  Dept: [phone]    PRE-OP EVALUATION:  Today's date: 2018    Ren Lawton (: 1995) presents for pre-operative evaluation assessment as requested by Dr. Jus Benitez.  He requires evaluation and anesthesia risk assessment prior to undergoing surgery/procedure for treatment of Discectomy Lumbar Posterior.    Fax number for surgical facility: [facility]  Primary Physician: Haase, Susan Rachele  Type of Anesthesia Anticipated: General    Patient has a Health Care Directive or Living Will:  NO    Preop Questions 2018   1.  Do you have a history of Heart attack, stroke, stent, coronary bypass surgery, or other heart surgery? No   2.  Do you ever have any pain or discomfort in your chest? No   3.  Do you have a history of  Heart Failure? No   4.   Are you troubled by shortness of breath when:  walking on a level surface, or up a slight hill, or at night? No   5.  Do you currently have a cold, bronchitis or other respiratory infection? No   6.  Do you have a cough, shortness of breath, or wheezing? No   7.  Do you sometimes get pains in the calves of your legs when you walk? No   8. Do you or anyone in your family have previous history of blood clots? No   9.  Do you or does anyone in your family have a serious bleeding problem such as prolonged bleeding following surgeries or cuts? No   10. Have you ever had problems with anemia or been told to take iron pills? No   11. Have you had any abnormal blood loss such as black, tarry or bloody stools? No   12. Have you ever had a blood transfusion? No   13. Have you or any of your relatives ever had problems with anesthesia? No   14. Do you have sleep apnea, excessive snoring or daytime drowsiness? YES - daytime drowsiness   15. Do you have any prosthetic heart valves? No   16. Do you have prosthetic joints? No         HPI:     HPI related  to upcoming procedure: Ren Lawton is a 22 year old male who presents to the clinic for a pre-op for a discectomy lumbar posterior. Accompanied by his mother. He reports feeling well, denies URI symptoms. Patient reports numbness, occasional pain of left lower back. Currently taking Adderall daily. Denies complications with past surgeries. Mother denies family history knowledge of complication with anesthesia.     MEDICAL HISTORY:     Patient Active Problem List    Diagnosis Date Noted     Attention deficit hyperactivity disorder (ADHD), unspecified ADHD type 09/08/2017     Priority: Medium     Patient is followed by HAASE, SUSAN RACHELE for ongoing prescription of stimulants.  All refills should be approved by this provider, or covering partner.    Medication(s): adderall XR 30 mg every day. Adderall IR 10 mg every day  Maximum quantity per month: #30, #30  Clinic visit frequency required: Q 3 months     Controlled substance agreement on file: Yes  Neuropsych evaluation for ADD completed:  Yes, completed 8/22/2017, on file and diagnosis confirmed    Last [facility] website verification:  done on 9/8/2017   [email or web address]         Anxiety 05/31/2017     Priority: Medium     Intervertebral disc protrusion 06/08/2015     Priority: Medium     Lumbar foraminal stenosis 06/08/2015     Priority: Medium     Obesity 06/08/2015     Priority: Medium     GERD (gastroesophageal reflux disease) 04/21/2011     Priority: Medium      Past Medical History:   Diagnosis Date     LBP (low back pain) 2/5/2015     History reviewed. No pertinent surgical history.  Current Outpatient Prescriptions   Medication Sig Dispense Refill     [START ON 6/4/2018] amphetamine-dextroamphetamine (ADDERALL XR) 30 MG per 24 hr capsule Take 1 capsule (30 mg) by mouth daily 30 capsule 0     [START ON 6/4/2018] amphetamine-dextroamphetamine (ADDERALL) 10 MG per tablet Take 1 tablet (10 mg) by mouth daily (with lunch) 30 tablet 0     benzoyl  "peroxide 10 % topical gel Apply topically At Bedtime 56 g 9     OTC products: None, except as noted above    Allergies   Allergen Reactions     Cats       Latex Allergy: NO    Social History   Substance Use Topics     Smoking status: Current Every Day Smoker     Smokeless tobacco: Never Used     Alcohol use Yes     History   Drug Use No       REVIEW OF SYSTEMS:   CONSTITUTIONAL: NEGATIVE for fever, chills, change in weight  INTEGUMENTARY/SKIN: NEGATIVE for worrisome rashes, moles or lesions  EYES: NEGATIVE for vision changes or irritation  ENT/MOUTH: NEGATIVE for ear, mouth and throat problems  RESP: NEGATIVE for significant cough or SOB  BREAST: NEGATIVE for masses, tenderness or discharge  CV: NEGATIVE for chest pain, palpitations or peripheral edema  GI: NEGATIVE for nausea, abdominal pain, heartburn, or change in bowel habits  : NEGATIVE for frequency, dysuria, or hematuria  MUSCULOSKELETAL: lower back pain, see HPI  NEURO: NEGATIVE for weakness, dizziness. See HPI  ENDOCRINE: NEGATIVE for temperature intolerance, skin/hair changes  HEME: NEGATIVE for bleeding problems  PSYCHIATRIC: NEGATIVE for changes in mood or affect    This document serves as a record of the services and decisions personally performed and made by Susan Haase, CNP. It was created on her behalf by Lolis Muhammad, a trained medical scribe. The creation of this document is based on the provider's statements to the medical scribe.  Lolis Muhammad 9:31 AM May 7, 2018  EXAM:   /68  Pulse 90  Temp 97.7  F (36.5  C) (Oral)  Resp 16  Ht 1.778 m (5' 10\")  Wt 90.3 kg (199 lb)  SpO2 98%  BMI 28.55 kg/m2   GENERAL APPEARANCE: healthy, alert and no distress  EYES: EOMI,  PERRL  HENT: ear canals and TM's normal and nose and mouth without ulcers or lesions  NECK: no adenopathy, no asymmetry, masses, or scars and thyroid normal to palpation  RESP: lungs clear to auscultation - no rales, rhonchi or wheezes  CV: regular rates and rhythm, normal S1 S2, " no S3 or S4 and no murmur, click or rub  ABDOMEN:  soft, nontender, no HSM or masses and bowel sounds normal  MS: left para lumbar spinal tenderness.    SKIN: no suspicious lesions or rashes  NEURO: Normal strength and tone, sensory exam grossly normal, mentation intact and speech normal  PSYCH: mentation appears normal. and affect normal/bright  LYMPHATICS: No cervical adenopathy    DIAGNOSTICS:   HGB:  14.2    IMPRESSION:   Reason for surgery/procedure: Discectomy lumbar posterior   Diagnosis/reason for consult: Pre - op     The proposed surgical procedure is considered INTERMEDIATE risk.    REVISED CARDIAC RISK INDEX  The patient has the following serious cardiovascular risks for perioperative complications such as (MI, PE, VFib and 3  AV Block):  No serious cardiac risks  INTERPRETATION: 0 risks: Class I (very low risk - 0.4% complication rate)    The patient has the following additional risks for perioperative complications:  No identified additional risks      ICD-10-CM    1. Preop general physical exam Z01.818        RECOMMENDATIONS:   Ren was seen today for pre-op exam.    Diagnoses and all orders for this visit:    Preop general physical exam  -     CBC with platelets differential  -     Basic metabolic panel    Lumbar foraminal stenosis    Approval given to proceed with proposed procedure, without further diagnostic evaluation  Is aware of NPO orders and need to refrain from taking NSAIDS, Aspirin prior to surgery.      The information in this document, created by the medical scribe for me, accurately reflects the services I personally performed and the decisions made by me. I have reviewed and approved this document for accuracy prior to leaving the patient care area.  May 7, 2018 9:34 AM  Signed Electronically by: Susan Haase, APRN CNP    Copy of this evaluation report is provided to requesting physician.    [facility] Preop Guidelines    Revised Cardiac Risk Index

## 2018-05-14 ENCOUNTER — HOSPITAL ENCOUNTER (OUTPATIENT)
Facility: CLINIC | Age: 23
Discharge: HOME | End: 2018-05-14
Attending: NEUROLOGICAL SURGERY | Admitting: NEUROLOGICAL SURGERY
Payer: COMMERCIAL

## 2018-05-14 ENCOUNTER — SURGERY (OUTPATIENT)
Age: 23
End: 2018-05-14

## 2018-05-14 ENCOUNTER — ANESTHESIA (OUTPATIENT)
Dept: SURGERY | Facility: CLINIC | Age: 23
End: 2018-05-14
Payer: COMMERCIAL

## 2018-05-14 ENCOUNTER — ANESTHESIA EVENT (OUTPATIENT)
Dept: SURGERY | Facility: CLINIC | Age: 23
End: 2018-05-14
Payer: COMMERCIAL

## 2018-05-14 ENCOUNTER — TELEPHONE (OUTPATIENT)
Dept: NEUROSURGERY | Facility: CLINIC | Age: 23
End: 2018-05-14

## 2018-05-14 ENCOUNTER — APPOINTMENT (OUTPATIENT)
Dept: GENERAL RADIOLOGY | Facility: CLINIC | Age: 23
End: 2018-05-14
Attending: NEUROLOGICAL SURGERY
Payer: COMMERCIAL

## 2018-05-14 VITALS
HEIGHT: 70 IN | RESPIRATION RATE: 16 BRPM | BODY MASS INDEX: 27.76 KG/M2 | OXYGEN SATURATION: 99 % | DIASTOLIC BLOOD PRESSURE: 70 MMHG | SYSTOLIC BLOOD PRESSURE: 133 MMHG | WEIGHT: 193.9 LBS | TEMPERATURE: 97.7 F

## 2018-05-14 DIAGNOSIS — Z98.890 S/P LUMBAR MICRODISCECTOMY: Primary | ICD-10-CM

## 2018-05-14 PROCEDURE — 25000128 H RX IP 250 OP 636: Performed by: NEUROLOGICAL SURGERY

## 2018-05-14 PROCEDURE — 37000008 ZZH ANESTHESIA TECHNICAL FEE, 1ST 30 MIN: Performed by: NEUROLOGICAL SURGERY

## 2018-05-14 PROCEDURE — 25800025 ZZH RX 258: Performed by: NEUROLOGICAL SURGERY

## 2018-05-14 PROCEDURE — 37000009 ZZH ANESTHESIA TECHNICAL FEE, EACH ADDTL 15 MIN: Performed by: NEUROLOGICAL SURGERY

## 2018-05-14 PROCEDURE — 25000128 H RX IP 250 OP 636: Performed by: ANESTHESIOLOGY

## 2018-05-14 PROCEDURE — 25000132 ZZH RX MED GY IP 250 OP 250 PS 637: Performed by: NEUROLOGICAL SURGERY

## 2018-05-14 PROCEDURE — 63030 LAMOT DCMPRN NRV RT 1 LMBR: CPT | Performed by: NEUROLOGICAL SURGERY

## 2018-05-14 PROCEDURE — 25000125 ZZHC RX 250: Performed by: NURSE ANESTHETIST, CERTIFIED REGISTERED

## 2018-05-14 PROCEDURE — 25000128 H RX IP 250 OP 636: Performed by: NURSE ANESTHETIST, CERTIFIED REGISTERED

## 2018-05-14 PROCEDURE — 25000125 ZZHC RX 250: Performed by: NEUROLOGICAL SURGERY

## 2018-05-14 PROCEDURE — 25000300 ZZH OR RX SURGIFLO HEMOSTATIC MATRIX 10ML 199102S OPNP: Performed by: NEUROLOGICAL SURGERY

## 2018-05-14 PROCEDURE — 36000069 ZZH SURGERY LEVEL 5 EA 15 ADDTL MIN: Performed by: NEUROLOGICAL SURGERY

## 2018-05-14 PROCEDURE — 27210794 ZZH OR GENERAL SUPPLY STERILE: Performed by: NEUROLOGICAL SURGERY

## 2018-05-14 PROCEDURE — 25000132 ZZH RX MED GY IP 250 OP 250 PS 637: Performed by: ANESTHESIOLOGY

## 2018-05-14 PROCEDURE — 40000985 XR LUMBAR SPINE PORT 1 VW: Mod: TC

## 2018-05-14 PROCEDURE — 27210995 ZZH RX 272: Performed by: NEUROLOGICAL SURGERY

## 2018-05-14 PROCEDURE — 40000306 ZZH STATISTIC PRE PROC ASSESS II: Performed by: NEUROLOGICAL SURGERY

## 2018-05-14 PROCEDURE — 71000027 ZZH RECOVERY PHASE 2 EACH 15 MINS: Performed by: NEUROLOGICAL SURGERY

## 2018-05-14 PROCEDURE — 36000071 ZZH SURGERY LEVEL 5 W FLUORO 1ST 30 MIN: Performed by: NEUROLOGICAL SURGERY

## 2018-05-14 PROCEDURE — 71000012 ZZH RECOVERY PHASE 1 LEVEL 1 FIRST HR: Performed by: NEUROLOGICAL SURGERY

## 2018-05-14 PROCEDURE — 25000566 ZZH SEVOFLURANE, EA 15 MIN: Performed by: NEUROLOGICAL SURGERY

## 2018-05-14 RX ORDER — BUPIVACAINE HYDROCHLORIDE AND EPINEPHRINE 5; 5 MG/ML; UG/ML
INJECTION, SOLUTION PERINEURAL PRN
Status: DISCONTINUED | OUTPATIENT
Start: 2018-05-14 | End: 2018-05-14 | Stop reason: HOSPADM

## 2018-05-14 RX ORDER — SODIUM CHLORIDE, SODIUM LACTATE, POTASSIUM CHLORIDE, CALCIUM CHLORIDE 600; 310; 30; 20 MG/100ML; MG/100ML; MG/100ML; MG/100ML
INJECTION, SOLUTION INTRAVENOUS CONTINUOUS
Status: DISCONTINUED | OUTPATIENT
Start: 2018-05-14 | End: 2018-05-14 | Stop reason: HOSPADM

## 2018-05-14 RX ORDER — FENTANYL CITRATE 50 UG/ML
INJECTION, SOLUTION INTRAMUSCULAR; INTRAVENOUS PRN
Status: DISCONTINUED | OUTPATIENT
Start: 2018-05-14 | End: 2018-05-14

## 2018-05-14 RX ORDER — ONDANSETRON 4 MG/1
4 TABLET, ORALLY DISINTEGRATING ORAL EVERY 30 MIN PRN
Status: DISCONTINUED | OUTPATIENT
Start: 2018-05-14 | End: 2018-05-14 | Stop reason: HOSPADM

## 2018-05-14 RX ORDER — GLYCOPYRROLATE 0.2 MG/ML
INJECTION, SOLUTION INTRAMUSCULAR; INTRAVENOUS PRN
Status: DISCONTINUED | OUTPATIENT
Start: 2018-05-14 | End: 2018-05-14

## 2018-05-14 RX ORDER — HYDROMORPHONE HYDROCHLORIDE 2 MG/1
2-4 TABLET ORAL EVERY 6 HOURS PRN
Qty: 60 TABLET | Refills: 0 | Status: SHIPPED | OUTPATIENT
Start: 2018-05-14 | End: 2018-06-26

## 2018-05-14 RX ORDER — HYDROMORPHONE HYDROCHLORIDE 2 MG/1
2 TABLET ORAL ONCE
Status: COMPLETED | OUTPATIENT
Start: 2018-05-14 | End: 2018-05-14

## 2018-05-14 RX ORDER — ONDANSETRON 2 MG/ML
INJECTION INTRAMUSCULAR; INTRAVENOUS PRN
Status: DISCONTINUED | OUTPATIENT
Start: 2018-05-14 | End: 2018-05-14

## 2018-05-14 RX ORDER — HYDRALAZINE HYDROCHLORIDE 20 MG/ML
2.5-5 INJECTION INTRAMUSCULAR; INTRAVENOUS EVERY 10 MIN PRN
Status: DISCONTINUED | OUTPATIENT
Start: 2018-05-14 | End: 2018-05-14 | Stop reason: HOSPADM

## 2018-05-14 RX ORDER — KETOROLAC TROMETHAMINE 30 MG/ML
INJECTION, SOLUTION INTRAMUSCULAR; INTRAVENOUS PRN
Status: DISCONTINUED | OUTPATIENT
Start: 2018-05-14 | End: 2018-05-14

## 2018-05-14 RX ORDER — LIDOCAINE HYDROCHLORIDE 10 MG/ML
INJECTION, SOLUTION INFILTRATION; PERINEURAL PRN
Status: DISCONTINUED | OUTPATIENT
Start: 2018-05-14 | End: 2018-05-14

## 2018-05-14 RX ORDER — ACETAMINOPHEN 500 MG
1000 TABLET ORAL ONCE
Status: COMPLETED | OUTPATIENT
Start: 2018-05-14 | End: 2018-05-14

## 2018-05-14 RX ORDER — FENTANYL CITRATE 50 UG/ML
25-50 INJECTION, SOLUTION INTRAMUSCULAR; INTRAVENOUS
Status: DISCONTINUED | OUTPATIENT
Start: 2018-05-14 | End: 2018-05-14 | Stop reason: HOSPADM

## 2018-05-14 RX ORDER — NALOXONE HYDROCHLORIDE 0.4 MG/ML
.1-.4 INJECTION, SOLUTION INTRAMUSCULAR; INTRAVENOUS; SUBCUTANEOUS
Status: DISCONTINUED | OUTPATIENT
Start: 2018-05-14 | End: 2018-05-14 | Stop reason: HOSPADM

## 2018-05-14 RX ORDER — DIAZEPAM 5 MG
5 TABLET ORAL EVERY 6 HOURS PRN
Qty: 20 TABLET | Refills: 0 | Status: SHIPPED | OUTPATIENT
Start: 2018-05-14 | End: 2018-06-26

## 2018-05-14 RX ORDER — MEPERIDINE HYDROCHLORIDE 50 MG/ML
12.5 INJECTION INTRAMUSCULAR; INTRAVENOUS; SUBCUTANEOUS
Status: DISCONTINUED | OUTPATIENT
Start: 2018-05-14 | End: 2018-05-14 | Stop reason: HOSPADM

## 2018-05-14 RX ORDER — NEOSTIGMINE METHYLSULFATE 1 MG/ML
VIAL (ML) INJECTION PRN
Status: DISCONTINUED | OUTPATIENT
Start: 2018-05-14 | End: 2018-05-14

## 2018-05-14 RX ORDER — HYDROMORPHONE HYDROCHLORIDE 1 MG/ML
.3-.5 INJECTION, SOLUTION INTRAMUSCULAR; INTRAVENOUS; SUBCUTANEOUS EVERY 10 MIN PRN
Status: DISCONTINUED | OUTPATIENT
Start: 2018-05-14 | End: 2018-05-14 | Stop reason: HOSPADM

## 2018-05-14 RX ORDER — ONDANSETRON 2 MG/ML
4 INJECTION INTRAMUSCULAR; INTRAVENOUS EVERY 30 MIN PRN
Status: DISCONTINUED | OUTPATIENT
Start: 2018-05-14 | End: 2018-05-14 | Stop reason: HOSPADM

## 2018-05-14 RX ORDER — CEFAZOLIN SODIUM 1 G/3ML
1 INJECTION, POWDER, FOR SOLUTION INTRAMUSCULAR; INTRAVENOUS SEE ADMIN INSTRUCTIONS
Status: DISCONTINUED | OUTPATIENT
Start: 2018-05-14 | End: 2018-05-14 | Stop reason: HOSPADM

## 2018-05-14 RX ORDER — PROPOFOL 10 MG/ML
INJECTION, EMULSION INTRAVENOUS PRN
Status: DISCONTINUED | OUTPATIENT
Start: 2018-05-14 | End: 2018-05-14

## 2018-05-14 RX ORDER — DEXAMETHASONE SODIUM PHOSPHATE 4 MG/ML
INJECTION, SOLUTION INTRA-ARTICULAR; INTRALESIONAL; INTRAMUSCULAR; INTRAVENOUS; SOFT TISSUE PRN
Status: DISCONTINUED | OUTPATIENT
Start: 2018-05-14 | End: 2018-05-14

## 2018-05-14 RX ORDER — LIDOCAINE 40 MG/G
CREAM TOPICAL
Status: DISCONTINUED | OUTPATIENT
Start: 2018-05-14 | End: 2018-05-14 | Stop reason: HOSPADM

## 2018-05-14 RX ORDER — CEFAZOLIN SODIUM 2 G/100ML
2 INJECTION, SOLUTION INTRAVENOUS
Status: COMPLETED | OUTPATIENT
Start: 2018-05-14 | End: 2018-05-14

## 2018-05-14 RX ADMIN — GLYCOPYRROLATE 0.4 MG: 0.2 INJECTION, SOLUTION INTRAMUSCULAR; INTRAVENOUS at 08:42

## 2018-05-14 RX ADMIN — THROMBIN, TOPICAL (BOVINE) 5000 UNITS: KIT at 08:05

## 2018-05-14 RX ADMIN — Medication 3 MG: at 08:42

## 2018-05-14 RX ADMIN — THROMBIN HUMAN 8 ML: 2000 POWDER, FOR SOLUTION TOPICAL at 08:05

## 2018-05-14 RX ADMIN — KETOROLAC TROMETHAMINE 30 MG: 30 INJECTION, SOLUTION INTRAMUSCULAR at 09:10

## 2018-05-14 RX ADMIN — FENTANYL CITRATE 50 MCG: 50 INJECTION, SOLUTION INTRAMUSCULAR; INTRAVENOUS at 09:10

## 2018-05-14 RX ADMIN — GLYCOPYRROLATE 0.2 MG: 0.2 INJECTION, SOLUTION INTRAMUSCULAR; INTRAVENOUS at 07:34

## 2018-05-14 RX ADMIN — MIDAZOLAM 2 MG: 1 INJECTION INTRAMUSCULAR; INTRAVENOUS at 07:30

## 2018-05-14 RX ADMIN — CEFAZOLIN SODIUM 2 G: 2 INJECTION, SOLUTION INTRAVENOUS at 07:30

## 2018-05-14 RX ADMIN — DEXAMETHASONE SODIUM PHOSPHATE 8 MG: 4 INJECTION, SOLUTION INTRA-ARTICULAR; INTRALESIONAL; INTRAMUSCULAR; INTRAVENOUS; SOFT TISSUE at 07:34

## 2018-05-14 RX ADMIN — HYDROMORPHONE HYDROCHLORIDE 1 MG: 1 INJECTION, SOLUTION INTRAMUSCULAR; INTRAVENOUS; SUBCUTANEOUS at 07:52

## 2018-05-14 RX ADMIN — FENTANYL CITRATE 50 MCG: 50 INJECTION, SOLUTION INTRAMUSCULAR; INTRAVENOUS at 09:03

## 2018-05-14 RX ADMIN — BUPIVACAINE HYDROCHLORIDE AND EPINEPHRINE BITARTRATE 20 ML: 5; .005 INJECTION, SOLUTION EPIDURAL; INTRACAUDAL; PERINEURAL at 08:51

## 2018-05-14 RX ADMIN — FENTANYL CITRATE 100 MCG: 50 INJECTION, SOLUTION INTRAMUSCULAR; INTRAVENOUS at 07:33

## 2018-05-14 RX ADMIN — FENTANYL CITRATE 50 MCG: 50 INJECTION INTRAMUSCULAR; INTRAVENOUS at 09:27

## 2018-05-14 RX ADMIN — HYDROMORPHONE HYDROCHLORIDE 2 MG: 2 TABLET ORAL at 09:53

## 2018-05-14 RX ADMIN — LIDOCAINE HYDROCHLORIDE 50 MG: 10 INJECTION, SOLUTION INFILTRATION; PERINEURAL at 07:33

## 2018-05-14 RX ADMIN — FENTANYL CITRATE 50 MCG: 50 INJECTION INTRAMUSCULAR; INTRAVENOUS at 09:56

## 2018-05-14 RX ADMIN — GENTAMICIN SULFATE 500 ML: 40 INJECTION, SOLUTION INTRAMUSCULAR; INTRAVENOUS at 08:04

## 2018-05-14 RX ADMIN — THROMBIN, TOPICAL (BOVINE) 5000 UNITS: KIT at 08:34

## 2018-05-14 RX ADMIN — THROMBIN HUMAN 8 ML: 2000 POWDER, FOR SOLUTION TOPICAL at 08:34

## 2018-05-14 RX ADMIN — ONDANSETRON 4 MG: 2 INJECTION INTRAMUSCULAR; INTRAVENOUS at 08:42

## 2018-05-14 RX ADMIN — ROCURONIUM BROMIDE 50 MG: 10 INJECTION INTRAVENOUS at 07:34

## 2018-05-14 RX ADMIN — SODIUM CHLORIDE, POTASSIUM CHLORIDE, SODIUM LACTATE AND CALCIUM CHLORIDE: 600; 310; 30; 20 INJECTION, SOLUTION INTRAVENOUS at 08:38

## 2018-05-14 RX ADMIN — SODIUM CHLORIDE, POTASSIUM CHLORIDE, SODIUM LACTATE AND CALCIUM CHLORIDE: 600; 310; 30; 20 INJECTION, SOLUTION INTRAVENOUS at 07:23

## 2018-05-14 RX ADMIN — PROPOFOL 200 MG: 10 INJECTION, EMULSION INTRAVENOUS at 07:33

## 2018-05-14 RX ADMIN — ACETAMINOPHEN 1000 MG: 500 TABLET, FILM COATED ORAL at 06:49

## 2018-05-14 NOTE — ADDENDUM NOTE
Addendum  created 05/14/18 1022 by Kennedy Lowe APRN CRNA    Anesthesia Event edited, Anesthesia Intra LDAs edited, LDA properties accepted, Procedure Event Log accessed

## 2018-05-14 NOTE — ANESTHESIA PREPROCEDURE EVALUATION
Anesthesia Evaluation     . Pt has had prior anesthetic. Type: General    No history of anesthetic complications          ROS/MED HX    ENT/Pulmonary:  - neg pulmonary ROS     Neurologic:  - neg neurologic ROS     Cardiovascular:  - neg cardiovascular ROS       METS/Exercise Tolerance:     Hematologic:  - neg hematologic  ROS       Musculoskeletal:  - neg musculoskeletal ROS       GI/Hepatic:     (+) GERD       Renal/Genitourinary:  - ROS Renal section negative       Endo:     (+) Obesity, .      Psychiatric:  - neg psychiatric ROS       Infectious Disease:  - neg infectious disease ROS       Malignancy:      - no malignancy   Other:    - neg other ROS                 Physical Exam  Normal systems: cardiovascular, pulmonary and dental    Airway   Mallampati: I  TM distance: >3 FB  Neck ROM: full    Dental     Cardiovascular       Pulmonary                     Anesthesia Plan      History & Physical Review  History and physical reviewed and following examination; no interval change.    ASA Status:  1 .    NPO Status:  > 8 hours    Plan for General and ETT with Intravenous and Propofol induction. Maintenance will be Balanced.    PONV prophylaxis:  Ondansetron (or other 5HT-3) and Dexamethasone or Solumedrol       Postoperative Care  Postoperative pain management:  IV analgesics and Oral pain medications.      Consents  Anesthetic plan, risks, benefits and alternatives discussed with:  Patient or representative and Patient..                          .

## 2018-05-14 NOTE — ANESTHESIA POSTPROCEDURE EVALUATION
Patient: Ren Lawton    Procedure(s):  Left L5-S1 microdiscectomy - Wound Class: I-Clean    Diagnosis:large left L5-S1 herniated disc with lower left extermity radiculopathy  Diagnosis Additional Information: Large left L5-S1 herniated disk with left lower extremity radiculopathy    Anesthesia Type:  General, ETT    Note:  Anesthesia Post Evaluation    Patient location during evaluation: PACU  Patient participation: Able to fully participate in evaluation  Level of consciousness: awake and alert  Pain management: adequate  Airway patency: patent  Cardiovascular status: acceptable  Respiratory status: acceptable  Hydration status: acceptable  PONV: none     Anesthetic complications: None          Last vitals:  Vitals:    05/14/18 0915 05/14/18 0920 05/14/18 0925   BP: 126/73 132/64 126/68   Resp: 9 10 8   Temp:      SpO2: 99% 100% 100%         Electronically Signed By: Migue Baires MD  May 14, 2018  9:42 AM

## 2018-05-14 NOTE — IP AVS SNAPSHOT
MRN:[id number]                      After Visit Summary   5/14/2018    Ren Lawton    MRN: [id number]           Thank you!     Thank you for choosing [facility] for your care. Our goal is always to provide you with excellent care. Hearing back from our patients is one way we can continue to improve our services. Please take a few minutes to complete the written survey that you may receive in the mail after you visit. If you would like to speak to someone directly about your visit please contact Patient Relations at [phone]. Thank you!          Patient Information     Date Of Birth          1995        About your hospital stay     You were admitted on:  May 14, 2018 You last received care in the:  [facility]    You were discharged on:  May 14, 2018        Reason for your hospital stay       22 year old male s/p left L5-S1 microdiscectomy                  Who to Call     For medical emergencies, please call 911.  For non-urgent questions about your medical care, please call your primary care provider or clinic, [phone]  For questions related to your surgery, please call your surgery clinic        Attending Provider     Provider Specialty    Jus Benitez MD Neurosurgery       Primary Care Provider Office Phone # Fax #    Apurva Rachele Haase, APRN [phone]      After Care Instructions     Activity       Your activity upon discharge: no heavy lifting for 6 weeks            Diet       Follow this diet upon discharge: Regular                  Follow-up Appointments     Follow-up and recommended labs and tests        Neurosurgery appointment with Camilla Williamson CNP or Ilene Harrison CNP at [facility] in 4-6 weeks,  call [phone] for appointment                  Your next 10 appointments already scheduled     Jun 26, 2018 10:40 AM CDT   Return Visit with ANIRUDH Ocampo CNP   [facility] ([facility])    [address]  [facility] [id number] [phone]              Further instructions from your care team       GENERAL ANESTHESIA OR SEDATION ADULT DISCHARGE INSTRUCTIONS   SPECIAL PRECAUTIONS FOR 24 HOURS AFTER SURGERY    IT IS NOT UNUSUAL TO FEEL LIGHT-HEADED OR FAINT, UP TO 24 HOURS AFTER SURGERY OR WHILE TAKING PAIN MEDICATION.  IF YOU HAVE THESE SYMPTOMS; SIT FOR A FEW MINUTES BEFORE STANDING AND HAVE SOMEONE ASSIST YOU WHEN YOU GET UP TO WALK OR USE THE BATHROOM.    YOU SHOULD REST AND RELAX FOR THE NEXT 24 HOURS AND YOU MUST MAKE ARRANGEMENTS TO HAVE SOMEONE STAY WITH YOU FOR AT LEAST 24 HOURS AFTER YOUR DISCHARGE.  AVOID HAZARDOUS AND STRENUOUS ACTIVITIES.  DO NOT MAKE IMPORTANT DECISIONS FOR 24 HOURS.    DO NOT DRIVE ANY VEHICLE OR OPERATE MECHANICAL EQUIPMENT FOR 24 HOURS FOLLOWING THE END OF YOUR SURGERY.  EVEN THOUGH YOU MAY FEEL NORMAL, YOUR REACTIONS MAY BE AFFECTED BY THE MEDICATION YOU HAVE RECEIVED.    DO NOT DRINK ALCOHOLIC BEVERAGES FOR 24 HOURS FOLLOWING YOUR SURGERY.    DRINK CLEAR LIQUIDS (APPLE JUICE, GINGER ALE, 7-UP, BROTH, ETC.).  PROGRESS TO YOUR REGULAR DIET AS YOU FEEL ABLE.    YOU MAY HAVE A DRY MOUTH, A SORE THROAT, MUSCLES ACHES OR TROUBLE SLEEPING.  THESE SHOULD GO AWAY AFTER 24 HOURS.    CALL YOUR DOCTOR FOR ANY OF THE FOLLOWING:  SIGNS OF INFECTION (FEVER, GROWING TENDERNESS AT THE SURGERY SITE, A LARGE AMOUNT OF DRAINAGE OR BLEEDING, SEVERE PAIN, FOUL-SMELLING DRAINAGE, REDNESS OR SWELLING.    IT HAS BEEN OVER 8 TO 10 HOURS SINCE SURGERY AND YOU ARE STILL NOT ABLE TO URINATE (PASS WATER).     You received Toradol, an IV form of ibuprofen (Motrin) at 9:13am.  Do not take any ibuprofen products until 3:13pm.    SPINE SURGERY DISCHARGE INSTRUCTIONS  DR. JANETTE PUENTES M.D.  [phone]    1.  NO LIFTING OF MORE THAT 10 POUNDS UNTIL FOLLOW-UP VISIT IN 6-8 WEEKS.    2.  OK TO REMOVE DRESSING IN  "24 HOURS AND SHOWER OR BATHE, BUT DO NOT SCRUB OR SUBMERGE INCISION UNDER WATER FOR 6 WEEKS.  IF YOU HAVE DRAINAGE FROM THE INCISION, YOU MAY REPLACE THE DRESSING AS NEEDED.    3.  OK TO USE ICE PACKS TO THE BACK AREA FOR 20 MINUTES ON AND 20 MINUTES OFF FOR 24-48 HOURS AFTER SURGERY.  AVOID ICE CONTACT DIRECTLY TO THE SKIN OR INCISION.    4.  OK TO WALK AS MUCH AS TOLERATED.    5.  NO CONTACT SPORTS UNTIL FOLLOW-UP VISIT.    6.  NO HIGH IMPACT ACTIVITIES SUCH AS RUNNING/JOGGING, SNOWMOBILE OR FOUR BRINK RIDING OR ANY OTHER RECREACTIONAL VEHICLES.    CALL MY OFFICE [phone] FOR INCREASING REDNESS, SWELLING OR PUS DRAINING FROM THE INCISION.  CALL IF YOU HAVE INCREASED PAIN OR ANY OTHER QUESTIONS OR CONCERNS.        Pending Results     No orders found from 2018 to 5/15/2018.            Admission Information     Date & Time Provider Department Dept. Phone    2018 Jus Benitez MD [facility] [phone]      Your Vitals Were     Blood Pressure Temperature Respirations Height Weight Pulse Oximetry    127/81 97  F (36.1  C) (Temporal) 12 1.778 m (5' 10\") 88 kg (193 lb 14.4 oz) 98%    BMI (Body Mass Index)                   27.82 kg/m2           MyChart Information     [facility] lets you send messages to your doctor, view your test results, renew your prescriptions, schedule appointments and more. To sign up, go to [email or web address] . Click on \"Log in\" on the left side of the screen, which will take you to the Welcome page. Then click on \"Sign up Now\" on the right side of the page.     You will be asked to enter the access code listed below, as well as some personal information. Please follow the directions to create your username and password.     Your access code is: [email or web address]  Expires: 2018 10:53 AM     Your access code will  in 90 days. If you need help or a new code, please call your [place] clinic or [phone].        Care EveryWhere ID "     This is your Care EveryWhere ID. This could be used by other organizations to access your [place] medical records  [id number]        Equal Access to Services     KLARISSA HALE : Woody Mclean, mane nath, favio lamakaitlin soliz, puma rosales brooksanty pedersenhernesto cheatham la'hiltonsanty beard. So [facility] [phone].    ATENCIÓN: Si habla español, tiene a foley disposición servicios gratuitos de asistencia lingüística. Llame al [phone].    We comply with applicable federal civil rights laws and [place] laws. We do not discriminate on the basis of race, color, national origin, age, disability, sex, sexual orientation, or gender identity.               Review of your medicines      START taking        Dose / Directions    diazepam 5 MG tablet   Commonly known as:  VALIUM   Used for:  S/P lumbar microdiscectomy        Dose:  5 mg   Take 1 tablet (5 mg) by mouth every 6 hours as needed for muscle spasms   Quantity:  20 tablet   Refills:  0       HYDROmorphone 2 MG tablet   Commonly known as:  DILAUDID   Used for:  S/P lumbar microdiscectomy        Dose:  2-4 mg   Take 1-2 tablets (2-4 mg) by mouth every 6 hours as needed for pain   Quantity:  60 tablet   Refills:  0         CONTINUE these medicines which have NOT CHANGED        Dose / Directions    * amphetamine-dextroamphetamine 30 MG per 24 hr capsule   Commonly known as:  ADDERALL XR   Used for:  Attention deficit hyperactivity disorder (ADHD), unspecified ADHD type        Dose:  30 mg   Start taking on:  6/4/2018   Take 1 capsule (30 mg) by mouth daily   Quantity:  30 capsule   Refills:  0       * amphetamine-dextroamphetamine 10 MG per tablet   Commonly known as:  ADDERALL   Used for:  Attention deficit hyperactivity disorder (ADHD), unspecified ADHD type        Dose:  10 mg   Start taking on:  6/4/2018   Take 1 tablet (10 mg) by mouth daily (with lunch)   Quantity:  30 tablet   Refills:  0       benzoyl peroxide 10 % topical gel   Used for:  Other acne         Apply topically At Bedtime   Quantity:  56 g   Refills:  9       multivitamin, therapeutic Tabs tablet        Dose:  1 tablet   Take 1 tablet by mouth daily   Refills:  0       * Notice:  This list has 2 medication(s) that are the same as other medications prescribed for you. Read the directions carefully, and ask your doctor or other care provider to review them with you.      STOP taking     IBUPROFEN PO                Where to get your medicines      Some of these will need a paper prescription and others can be bought over the counter. Ask your nurse if you have questions.     Bring a paper prescription for each of these medications     diazepam 5 MG tablet    HYDROmorphone 2 MG tablet                Protect others around you: Learn how to safely use, store and throw away your medicines at www.disposemymeds.org.        Information about OPIOIDS     PRESCRIPTION OPIOIDS: WHAT YOU NEED TO KNOW   You have a prescription for an opioid (narcotic) pain medicine. Opioids can cause addiction. If you have a history of chemical dependency of any type, you are at a higher risk of becoming addicted to opioids. Only take this medicine after all other options have been tried. Take it for as short a time and as few doses as possible.     Do not:    Drive. If you drive while taking these medicines, you could be arrested for driving under the influence (DUI).    Operate heavy machinery    Do any other dangerous activities while taking these medicines.     Drink any alcohol while taking these medicines.      Take with any other medicines that contain acetaminophen. Read all labels carefully. Look for the word  acetaminophen  or  Tylenol.  Ask your pharmacist if you have questions or are unsure.    Store your pills in a secure place, locked if possible. We will not replace any lost or stolen medicine. If you don t finish your medicine, please throw away (dispose) as directed by your pharmacist. The [place] Pollution Control  [employer]ency has more information about safe disposal: https://www.[facility].mn.us/living-green/managing-unwanted-medications    All opioids tend to cause constipation. Drink plenty of water and eat foods that have a lot of fiber, such as fruits, vegetables, prune juice, apple juice and high-fiber cereal. Take a laxative (Miralax, milk of magnesia, Colace, Senna) if you don t move your bowels at least every other day.              Medication List: This is a list of all your medications and when to take them. Check marks below indicate your daily home schedule. Keep this list as a reference.      Medications           Morning Afternoon Evening Bedtime As Needed    * amphetamine-dextroamphetamine 30 MG per 24 hr capsule   Commonly known as:  ADDERALL XR   Take 1 capsule (30 mg) by mouth daily   Start taking on:  6/4/2018                                * amphetamine-dextroamphetamine 10 MG per tablet   Commonly known as:  ADDERALL   Take 1 tablet (10 mg) by mouth daily (with lunch)   Start taking on:  6/4/2018                                benzoyl peroxide 10 % topical gel   Apply topically At Bedtime                                diazepam 5 MG tablet   Commonly known as:  VALIUM   Take 1 tablet (5 mg) by mouth every 6 hours as needed for muscle spasms                                HYDROmorphone 2 MG tablet   Commonly known as:  DILAUDID   Take 1-2 tablets (2-4 mg) by mouth every 6 hours as needed for pain   Last time this was given:  2 mg on 5/14/2018  9:53 AM                                multivitamin, therapeutic Tabs tablet   Take 1 tablet by mouth daily                                * Notice:  This list has 2 medication(s) that are the same as other medications prescribed for you. Read the directions carefully, and ask your doctor or other care provider to review them with you.

## 2018-05-14 NOTE — TELEPHONE ENCOUNTER
Returned call. Spoke to mother Magy. Patient had lumbar microdiscectomy today and had questions regarding when to give him pain meds. Advised to make sure to space out valium and dilaudid . Advised to not give together as they are both respiratory depressants and can use tylenol in between. Ok to resume NSAIDs a week post op. Magy verbalized understanding.

## 2018-05-14 NOTE — DISCHARGE INSTRUCTIONS
GENERAL ANESTHESIA OR SEDATION ADULT DISCHARGE INSTRUCTIONS   SPECIAL PRECAUTIONS FOR 24 HOURS AFTER SURGERY    IT IS NOT UNUSUAL TO FEEL LIGHT-HEADED OR FAINT, UP TO 24 HOURS AFTER SURGERY OR WHILE TAKING PAIN MEDICATION.  IF YOU HAVE THESE SYMPTOMS; SIT FOR A FEW MINUTES BEFORE STANDING AND HAVE SOMEONE ASSIST YOU WHEN YOU GET UP TO WALK OR USE THE BATHROOM.    YOU SHOULD REST AND RELAX FOR THE NEXT 24 HOURS AND YOU MUST MAKE ARRANGEMENTS TO HAVE SOMEONE STAY WITH YOU FOR AT LEAST 24 HOURS AFTER YOUR DISCHARGE.  AVOID HAZARDOUS AND STRENUOUS ACTIVITIES.  DO NOT MAKE IMPORTANT DECISIONS FOR 24 HOURS.    DO NOT DRIVE ANY VEHICLE OR OPERATE MECHANICAL EQUIPMENT FOR 24 HOURS FOLLOWING THE END OF YOUR SURGERY.  EVEN THOUGH YOU MAY FEEL NORMAL, YOUR REACTIONS MAY BE AFFECTED BY THE MEDICATION YOU HAVE RECEIVED.    DO NOT DRINK ALCOHOLIC BEVERAGES FOR 24 HOURS FOLLOWING YOUR SURGERY.    DRINK CLEAR LIQUIDS (APPLE JUICE, GINGER ALE, 7-UP, BROTH, ETC.).  PROGRESS TO YOUR REGULAR DIET AS YOU FEEL ABLE.    YOU MAY HAVE A DRY MOUTH, A SORE THROAT, MUSCLES ACHES OR TROUBLE SLEEPING.  THESE SHOULD GO AWAY AFTER 24 HOURS.    CALL YOUR DOCTOR FOR ANY OF THE FOLLOWING:  SIGNS OF INFECTION (FEVER, GROWING TENDERNESS AT THE SURGERY SITE, A LARGE AMOUNT OF DRAINAGE OR BLEEDING, SEVERE PAIN, FOUL-SMELLING DRAINAGE, REDNESS OR SWELLING.    IT HAS BEEN OVER 8 TO 10 HOURS SINCE SURGERY AND YOU ARE STILL NOT ABLE TO URINATE (PASS WATER).     You received Toradol, an IV form of ibuprofen (Motrin) at 9:13am.  Do not take any ibuprofen products until 3:13pm.    SPINE SURGERY DISCHARGE INSTRUCTIONS  DR. JANETTE PUENTES M.D.  [phone]    1.  NO LIFTING OF MORE THAT 10 POUNDS UNTIL FOLLOW-UP VISIT IN 6-8 WEEKS.    2.  OK TO REMOVE DRESSING IN 24 HOURS AND SHOWER OR BATHE, BUT DO NOT SCRUB OR SUBMERGE INCISION UNDER WATER FOR 6 WEEKS.  IF YOU HAVE DRAINAGE FROM THE INCISION, YOU MAY REPLACE THE DRESSING AS NEEDED.    3.  OK TO USE ICE  PACKS TO THE BACK AREA FOR 20 MINUTES ON AND 20 MINUTES OFF FOR 24-48 HOURS AFTER SURGERY.  AVOID ICE CONTACT DIRECTLY TO THE SKIN OR INCISION.    4.  OK TO WALK AS MUCH AS TOLERATED.    5.  NO CONTACT SPORTS UNTIL FOLLOW-UP VISIT.    6.  NO HIGH IMPACT ACTIVITIES SUCH AS RUNNING/JOGGING, SNOWMOBILE OR FOUR BRINK RIDING OR ANY OTHER RECREACTIONAL VEHICLES.    CALL MY OFFICE [phone] FOR INCREASING REDNESS, SWELLING OR PUS DRAINING FROM THE INCISION.  CALL IF YOU HAVE INCREASED PAIN OR ANY OTHER QUESTIONS OR CONCERNS.

## 2018-05-14 NOTE — IP AVS SNAPSHOT
[facility]    [address]    [facility] [address]    Phone:  [phone]    Fax:  [phone]                                       After Visit Summary   5/14/2018    Ren Lawton    MRN: [id number]           After Visit Summary Signature Page     I have received my discharge instructions, and my questions have been answered. I have discussed any challenges I see with this plan with the nurse or doctor.    ..........................................................................................................................................  Patient/Patient Representative Signature      ..........................................................................................................................................  Patient Representative Print Name and Relationship to Patient    ..................................................               ................................................  Date                                            Time    ..........................................................................................................................................  Reviewed by Signature/Title    ...................................................              ..............................................  Date                                                            Time

## 2018-05-14 NOTE — OP NOTE
Operative Report    PREOPERATIVE DIAGNOSIS: Large left L5-S1 herniated disk with left lower extremity radiculopathy     POSTOPERATIVE DIAGNOSIS: Same    PROCEDURE: Left L5-S1 hemilaminectomy, medial facetectomy, foraminotomy with microdiscectomy and use of X-ray and intraoperative microscope     ASSISTANT: Anton Edouard     INDICATION FOR PROCEDURE: The patient is a 22 year old male that presented with LLE radiculopathy. After reviewing the imaging studies and examination, the decision was made to proceed with the above procedure. The patient understood the risk of surgery to be infection, CSF leak, nerve root injury, failure of improvement of his symptoms. The patient voiced understanding and wanted to proceed.     DESCRIPTION OF PROCEDURE: The patient was seen in the pre op area and the procedure was discussed with the patient and family once again and all questions were answered. The consent was then signed and the lumbar spine was marked with a marker. The patient was transported to the operating room on a stretcher and received general endotracheal anesthesia. The patient was placed on the operating table in the prone position on the Damien frame with all pressure points padded. The planned operative area of the back was prepped and draped in normal sterile fashion. Portable X-ray was used to identify the appropriate level. Local anesthesia was then injected along the planned incision. A 10 blade scalpel was used to make a midline incision with dissection down through the subcutaneous tissue to the fascia. The fascia was opened on the left side with the Bovie cautery. Subperiosteal dissection exposed the lamina facet joint at L5-S1. The Klein retractor was then placed. The Midas Parvez drill and the Kerrison rongeur were used to perform the hemilaminectomy, medial facetectomy, foraminotomy, and to remove the ligamentum flavum. The thecal sac and S1 nerve root were identified. There was a large left disk  fragment compressing the left S1 root. The annulus was incised with the 11 blade knife and the disk was then removed with the Geovanni curette and also the pituitary rongeur. The nerve root was then noted to be decompressed and the Yost ball hook was used to verify that. Copious irrigation was performed with saline. The retractor was removed and the wound was irrigated once again. The fascia was then closed with 0-Vicryl, the subcutaneous tissue with 3-0 Vicryl, and the skin closed with dermabond. The patient was then transported back to the stretcher, extubated, and sent to recovery. At the end of the case, all counts were correct.     No complications.     ESTIMATED BLOOD LOSS: 15 ml     IV FLUID: See Anesthesia report     The patient received Ancef preoperatively.     Jus Benitez MD, MS, FAANS

## 2018-05-14 NOTE — TELEPHONE ENCOUNTER
REASON FOR CALL:  Patient underwent surgery today with Dr. Benitez.  Mother calling with questions regarding medication dosing and timing.      Detailed message can be left:  YES

## 2018-05-14 NOTE — ANESTHESIA CARE TRANSFER NOTE
Patient: Ren Lawton    Procedure(s):  Left L5-S1 microdiscectomy - Wound Class: I-Clean    Diagnosis: large left L5-S1 herniated disc with lower left extermity radiculopathy  Diagnosis Additional Information: No value filed.    Anesthesia Type:   General, ETT     Note:  Airway :Face Mask  Patient transferred to:PACU  Comments: Robbi Report: Identifed the Patient, Identified the Reponsible Provider, Reviewed the pertinent medical history, Discussed the surgical course, Reviewed Intra-OP anesthesia mangement and issues during anesthesia, Set expectations for post-procedure period and Allowed opportunity for questions and acknowledgement of understanding      Vitals: (Last set prior to Anesthesia Care Transfer)    CRNA VITALS  5/14/2018 0836 - 5/14/2018 0911      5/14/2018             Pulse: 92    SpO2: 100 %    Resp Rate (observed): 12                Electronically Signed By: ANIRUDH Herrera CRNA  May 14, 2018  9:11 AM

## 2018-05-17 ENCOUNTER — TELEPHONE (OUTPATIENT)
Dept: NEUROSURGERY | Facility: CLINIC | Age: 23
End: 2018-05-17

## 2018-05-17 NOTE — TELEPHONE ENCOUNTER
Post op call-Called and LVM.Patient is S/P Microdiscectomy on 5/14/18. Advised to call if any questions or concerns.

## 2018-05-25 DIAGNOSIS — J30.2 ACUTE SEASONAL ALLERGIC RHINITIS, UNSPECIFIED TRIGGER: Primary | ICD-10-CM

## 2018-05-25 RX ORDER — FLUTICASONE PROPIONATE 50 MCG
1-2 SPRAY, SUSPENSION (ML) NASAL DAILY
Qty: 1 BOTTLE | Refills: 11 | Status: SHIPPED | OUTPATIENT
Start: 2018-05-25 | End: 2020-03-26

## 2018-06-26 ENCOUNTER — OFFICE VISIT (OUTPATIENT)
Dept: NEUROSURGERY | Facility: CLINIC | Age: 23
End: 2018-06-26
Attending: NURSE PRACTITIONER
Payer: COMMERCIAL

## 2018-06-26 VITALS
HEART RATE: 76 BPM | WEIGHT: 199 LBS | OXYGEN SATURATION: 100 % | DIASTOLIC BLOOD PRESSURE: 78 MMHG | SYSTOLIC BLOOD PRESSURE: 142 MMHG | HEIGHT: 70 IN | BODY MASS INDEX: 28.49 KG/M2

## 2018-06-26 DIAGNOSIS — Z98.890 STATUS POST LUMBAR MICRODISCECTOMY: Primary | ICD-10-CM

## 2018-06-26 PROCEDURE — 99024 POSTOP FOLLOW-UP VISIT: CPT | Performed by: NURSE PRACTITIONER

## 2018-06-26 PROCEDURE — G0463 HOSPITAL OUTPT CLINIC VISIT: HCPCS | Performed by: NURSE PRACTITIONER

## 2018-06-26 ASSESSMENT — PAIN SCALES - GENERAL: PAINLEVEL: NO PAIN (0)

## 2018-06-26 NOTE — MR AVS SNAPSHOT
"              After Visit Summary   6/26/2018    Ren Lawton    MRN: [id number]           Patient Information     Date Of Birth          1995        Visit Information        Provider Department      6/26/2018 10:40 AM Camilla Williamson APRN CNP [facility]        Care Instructions    - May increase lifting restriction to 20 pounds and increase to 30 pounds at 12 weeks and slowly increase as tolerated using pain as your guide.   - followup as needed  - Call the clinic at [phone] for increased pain or any other questions and concerns.          Follow-ups after your visit        Who to contact     If you have questions or need follow up information about today's clinic visit or your schedule please contact [facility] directly at [phone].  Normal or non-critical lab and imaging results will be communicated to you by MyChart, letter or phone within 4 business days after the clinic has received the results. If you do not hear from us within 7 days, please contact the clinic through MyChart or phone. If you have a critical or abnormal lab result, we will notify you by phone as soon as possible.  Submit refill requests through [facility] or call your pharmacy and they will forward the refill request to us. Please allow 3 business days for your refill to be completed.          Additional Information About Your Visit        MyChart Information     [facility] lets you send messages to your doctor, view your test results, renew your prescriptions, schedule appointments and more. To sign up, go to [email or web address] . Click on \"Log in\" on the left side of the screen, which will take you to the Welcome page. Then click on \"Sign up Now\" on the right side of the page.     You will be asked to enter the access code listed below, as well as some personal information. Please follow the directions to create your username and password.     Your access code is: " "[email or web address]  Expires: 2018 10:36 AM     Your access code will  in 90 days. If you need help or a new code, please call your [place] clinic or [phone].        Care EveryWhere ID     This is your Care EveryWhere ID. This could be used by other organizations to access your [place] medical records  [id number]        Your Vitals Were     Pulse Height Pulse Oximetry BMI (Body Mass Index)          76 5' 10\" (1.778 m) 100% 28.55 kg/m2         Blood Pressure from Last 3 Encounters:   18 142/78   18 133/70   18 132/68    Weight from Last 3 Encounters:   18 199 lb (90.3 kg)   18 193 lb 14.4 oz (88 kg)   18 199 lb (90.3 kg)              Today, you had the following     No orders found for display         Today's Medication Changes          These changes are accurate as of 18 10:58 AM.  If you have any questions, ask your nurse or doctor.               Stop taking these medicines if you haven't already. Please contact your care team if you have questions.     diazepam 5 MG tablet   Commonly known as:  VALIUM   Stopped by:  Camilla Williamson APRN CNP           HYDROmorphone 2 MG tablet   Commonly known as:  DILAUDID   Stopped by:  Camilla Williamson APRN CNP                    Primary Care Provider Office Phone # Fax #    Apurva Rachele Haase, APRN [phone] [address] [facility] [address] [address]        Equal Access to Services     [facility]: Hadii tarik benites hadasho Soomaali, waaxda luqadaha, qaybta kaalmada puma soliz . So [facility] [phone].    ATENCIÓN: Si habla español, tiene a foley disposición servicios gratuitos de asistencia lingüística. Llame al [phone].    We comply with applicable federal civil rights laws and [place] laws. We do not discriminate on the basis of race, color, national origin, age, disability, sex, sexual orientation, or gender identity.            Thank you!     Thank you " for choosing [facility]  for your care. Our goal is always to provide you with excellent care. Hearing back from our patients is one way we can continue to improve our services. Please take a few minutes to complete the written survey that you may receive in the mail after your visit with us. Thank you!             Your Updated Medication List - Protect others around you: Learn how to safely use, store and throw away your medicines at www.disposemymeds.org.          This list is accurate as of 6/26/18 10:58 AM.  Always use your most recent med list.                   Brand Name Dispense Instructions for use Diagnosis    * amphetamine-dextroamphetamine 30 MG per 24 hr capsule    ADDERALL XR    30 capsule    Take 1 capsule (30 mg) by mouth daily    Attention deficit hyperactivity disorder (ADHD), unspecified ADHD type       * amphetamine-dextroamphetamine 10 MG per tablet    ADDERALL    30 tablet    Take 1 tablet (10 mg) by mouth daily (with lunch)    Attention deficit hyperactivity disorder (ADHD), unspecified ADHD type       benzoyl peroxide 10 % topical gel     56 g    Apply topically At Bedtime    Other acne       fluticasone 50 MCG/ACT spray    FLONASE    1 Bottle    Spray 1-2 sprays into both nostrils daily    Acute seasonal allergic rhinitis, unspecified trigger       ketotifen 0.025 % Soln ophthalmic solution    ZADITOR    1 Bottle    Place 1 drop into both eyes every 12 hours    Acute seasonal allergic rhinitis, unspecified trigger       multivitamin, therapeutic Tabs tablet      Take 1 tablet by mouth daily        * Notice:  This list has 2 medication(s) that are the same as other medications prescribed for you. Read the directions carefully, and ask your doctor or other care provider to review them with you.

## 2018-06-26 NOTE — PATIENT INSTRUCTIONS
- May increase lifting restriction to 20 pounds and increase to 30 pounds at 12 weeks and slowly increase as tolerated using pain as your guide.   - followup as needed  - Call the clinic at [phone] for increased pain or any other questions and concerns.

## 2018-06-26 NOTE — LETTER
[facility]   [facility]  [address] [place] [address]  [id number]    June 26, 2018      To Whom it May Concern,          Ren SCHERER Jered was first seen in clinic on 4-.  He was unable to work out from that time until present due to lumbar surgery.          Sincerely,            Camilla Williamson CNP  [facility]  [address] [address]    Tel [phone]

## 2018-06-26 NOTE — LETTER
June 26, 2018      To Whom it May Concern,          Ren Lawton was seen in clinic on 6-.  He is able to return to work with the following restrictions on Tuesday, 7-:      Restrictions:    - No lifting greater than 20 pounds. Until  8-6-2018, Then  May increase to 30 pounds and increase as tolerated.    - May slowly start bending, twisting, or overhead reaching  - 5 minute hourly stretch breaks       Sincerely,            Camilla Williamson [facility]  [facility]  [address] [address]    Tel [phone]

## 2018-06-26 NOTE — PROGRESS NOTES
[facility]  Neurosurgery followup:    HPI: Mr. Lawton is a 22 year old male that returns 6 weeks post left L5-S1 hemilaminectomy and microdiscectomy.   He reports that his left leg pain has resolved.  He states that he is doing well but rolled his ankle yesterday so it is swollen. Otherwise he is doing well.        Exam:  Constitutional:  Alert, well nourished, NAD.  HEENT: Normocephalic, atraumatic.   Pulm:  Without shortness of breath   CV:  No pitting edema of BLE.      Neurological:  Awake  Alert  Oriented x 3  Motor exam:        IP Q DF PF EHL  R   5  5   5   5    5  L   5  5   5   5    5     Able to spontaneously move L/E bilaterally  Sensation intact throughout all L/E dermatomes     Incisions:  Lumbar incision healing with scabbing      A/P: Mr. Lawton is a 22 year old male that returns 6 weeks post left L5-S1 hemilaminectomy and microdiscectomy.   He reports that his left leg pain has resolved.  He states that he is doing well but rolled his ankle yesterday so it is swollen. Otherwise he is doing well.  We discussed his restrictions in detail.  The pt is very active and eager to resume activity. It was explained that he will need to ease in to activity to prevent re-herniation.        Patient Instructions   - May increase lifting restriction to 20 pounds and increase to 30 pounds at 12 weeks and slowly increase as tolerated using pain as your guide.   - followup as needed  - Call the clinic at [phone] for increased pain or any other questions and concerns.           Camilla Williamson [facility]  [facility]  [address]  [address] [place] [address]    Tel [phone]  Pager [phone]

## 2018-06-26 NOTE — NURSING NOTE
"Ren Lawton is a 22 year old male who presents for:  Chief Complaint   Patient presents with     Neurologic Problem     6 week follow up status post microdiscectomy DOS 05/14/2018        Vitals:    Vitals:    06/26/18 1046   BP: 142/78   BP Location: Right arm   Patient Position: Sitting   Cuff Size: Adult Regular   Pulse: 76   SpO2: 100%   Weight: 199 lb (90.3 kg)   Height: 5' 10\" (1.778 m)       BMI:  Estimated body mass index is 28.55 kg/(m^2) as calculated from the following:    Height as of this encounter: 5' 10\" (1.778 m).    Weight as of this encounter: 199 lb (90.3 kg).    Pain Score:  No Pain (0)      Do you feel safe in your environment?  Yes      Roxanna Schaefer          "

## 2018-06-26 NOTE — LETTER
6/26/2018         RE: Ren Lawton  [address]  [place] [address]        Dear Colleague,    Thank you for referring your patient, Ren Lawton, to the [facility]. Please see a copy of my visit note below.    [facility]  Neurosurgery followup:    HPI: Mr. Lawton is a 22 year old male that returns 6 weeks post left L5-S1 hemilaminectomy and microdiscectomy.   He reports that his left leg pain has resolved.  He states that he is doing well but rolled his ankle yesterday so it is swollen. Otherwise he is doing well.        Exam:  Constitutional:  Alert, well nourished, NAD.  HEENT: Normocephalic, atraumatic.   Pulm:  Without shortness of breath   CV:  No pitting edema of BLE.      Neurological:  Awake  Alert  Oriented x 3  Motor exam:        IP Q DF PF EHL  R   5  5   5   5    5  L   5  5   5   5    5     Able to spontaneously move L/E bilaterally  Sensation intact throughout all L/E dermatomes     Incisions:  Lumbar incision healing with scabbing      A/P: Mr. Lawton is a 22 year old male that returns 6 weeks post left L5-S1 hemilaminectomy and microdiscectomy.   He reports that his left leg pain has resolved.  He states that he is doing well but rolled his ankle yesterday so it is swollen. Otherwise he is doing well.  We discussed his restrictions in detail.  The pt is very active and eager to resume activity. It was explained that he will need to ease in to activity to prevent re-herniation.        Patient Instructions   - May increase lifting restriction to 20 pounds and increase to 30 pounds at 12 weeks and slowly increase as tolerated using pain as your guide.   - followup as needed  - Call the clinic at [phone] for increased pain or any other questions and concerns.           Camilla Williamson [facility]  [facility]  [address]  [address] [place] [address]    Tel [phone]  Pager  [phone]        Again, thank you for allowing me to participate in the care of your patient.        Sincerely,        ANIRUDH Ocampo CNP

## 2018-07-03 ENCOUNTER — TRANSFERRED RECORDS (OUTPATIENT)
Dept: HEALTH INFORMATION MANAGEMENT | Facility: CLINIC | Age: 23
End: 2018-07-03

## 2018-07-10 ENCOUNTER — TELEPHONE (OUTPATIENT)
Dept: FAMILY MEDICINE | Facility: CLINIC | Age: 23
End: 2018-07-10

## 2018-07-10 DIAGNOSIS — F90.9 ATTENTION DEFICIT HYPERACTIVITY DISORDER (ADHD), UNSPECIFIED ADHD TYPE: ICD-10-CM

## 2018-07-10 NOTE — TELEPHONE ENCOUNTER
Controlled Substance Refill Request for ADDERALL  Problem List Complete:  Yes      Overview Addendum 12/5/2017 12:17 PM by Haase, Susan Rachele, APRN CNP     Patient is followed by HAASE, SUSAN RACHELE for ongoing prescription of stimulants.  All refills should be approved by this provider, or covering partner.     Medication(s): adderall XR 30 mg every day. Adderall IR 10 mg every day  Maximum quantity per month: #30, #30  Clinic visit frequency required: Q 3 months      Controlled substance agreement on file: Yes  Neuropsych evaluation for ADD completed:  Yes, completed 8/22/2017, on file and diagnosis confirmed     Last [facility] website verification:  done on 9/8/2017   [email or web address]        Previous Version        checked in past 3 months?  No, route to RN     Please call patient when ready for .

## 2018-07-11 PROBLEM — F90.9 ATTENTION DEFICIT HYPERACTIVITY DISORDER (ADHD), UNSPECIFIED ADHD TYPE: Status: ACTIVE | Noted: 2017-09-08

## 2018-07-11 RX ORDER — DEXTROAMPHETAMINE SACCHARATE, AMPHETAMINE ASPARTATE MONOHYDRATE, DEXTROAMPHETAMINE SULFATE AND AMPHETAMINE SULFATE 7.5; 7.5; 7.5; 7.5 MG/1; MG/1; MG/1; MG/1
30 CAPSULE, EXTENDED RELEASE ORAL DAILY
Qty: 30 CAPSULE | Refills: 0 | Status: SHIPPED | OUTPATIENT
Start: 2018-07-11 | End: 2018-08-15 | Stop reason: DRUGHIGH

## 2018-07-11 RX ORDER — DEXTROAMPHETAMINE SACCHARATE, AMPHETAMINE ASPARTATE, DEXTROAMPHETAMINE SULFATE AND AMPHETAMINE SULFATE 2.5; 2.5; 2.5; 2.5 MG/1; MG/1; MG/1; MG/1
10 TABLET ORAL
Qty: 30 TABLET | Refills: 0 | Status: SHIPPED | OUTPATIENT
Start: 2018-07-11 | End: 2018-08-23

## 2018-07-11 NOTE — TELEPHONE ENCOUNTER
Please place script at [facility] , if approved.    [place] reviewed:  reviewed- no concerns    [place]  [email or web address]    Last Filled:    Adderall 10mg   6/7/2018, #30  5/5/2018, #30      Adderall XR 30mg  6/7/2018, #30  5/5/2018, #30    Kimberli GEORGES RN, BSN, PHN  [place] [facility] RN

## 2018-08-10 DIAGNOSIS — F90.9 ATTENTION DEFICIT HYPERACTIVITY DISORDER (ADHD), UNSPECIFIED ADHD TYPE: ICD-10-CM

## 2018-08-10 RX ORDER — DEXTROAMPHETAMINE SACCHARATE, AMPHETAMINE ASPARTATE, DEXTROAMPHETAMINE SULFATE AND AMPHETAMINE SULFATE 2.5; 2.5; 2.5; 2.5 MG/1; MG/1; MG/1; MG/1
10 TABLET ORAL
Qty: 30 TABLET | Refills: 0 | Status: CANCELLED | OUTPATIENT
Start: 2018-08-10

## 2018-08-10 RX ORDER — DEXTROAMPHETAMINE SACCHARATE, AMPHETAMINE ASPARTATE MONOHYDRATE, DEXTROAMPHETAMINE SULFATE AND AMPHETAMINE SULFATE 7.5; 7.5; 7.5; 7.5 MG/1; MG/1; MG/1; MG/1
30 CAPSULE, EXTENDED RELEASE ORAL DAILY
Qty: 30 CAPSULE | Refills: 0 | Status: CANCELLED | OUTPATIENT
Start: 2018-08-10

## 2018-08-10 NOTE — TELEPHONE ENCOUNTER
Controlled Substance Refill Request for amphetamine-dextroamphetamine (ADDERALL XR) 30 MG per 24 hr capsule  Problem List Complete:  Yes    Patient is followed by HAASE, SUSAN RACHELE for ongoing prescription of stimulants.  All refills should be approved by this provider, or covering partner.     Medication(s): adderall XR 30 mg every day. Adderall IR 10 mg every day  Maximum quantity per month: #30, #30  Clinic visit frequency required: Q 3 months      Controlled substance agreement on file: Yes  Neuropsych evaluation for ADD completed:  Yes, completed 8/22/2017, on file and diagnosis confirmed     Last [facility] website verification:  done on 7/11/2018    Last Written Prescription Date:  07/11/18  Last Fill Quantity: 30,   # refills: 0    Last Office Visit with [facility] primary care provider: 05/07/18    Clinic visit frequency required: Q 3 months     Future Office visit:     Controlled substance agreement on file: Yes:  Date 12/21/17.     Processing:  Patient will  in clinic   checked in past 3 months?  Yes 07/11/18      Controlled Substance Refill Request for amphetamine-dextroamphetamine (ADDERALL) 10 MG per tablet  Problem List Complete:  Yes    Patient is followed by HAASE, SUSAN RACHELE for ongoing prescription of stimulants.  All refills should be approved by this provider, or covering partner.     Medication(s): adderall XR 30 mg every day. Adderall IR 10 mg every day  Maximum quantity per month: #30, #30  Clinic visit frequency required: Q 3 months      Controlled substance agreement on file: Yes  Neuropsych evaluation for ADD completed:  Yes, completed 8/22/2017, on file and diagnosis confirmed     Last [facility] website verification:  done on 7/11/2018    Last Written Prescription Date:  07/11/18  Last Fill Quantity: 30,   # refills: 0    Last Office Visit with [facility] primary care provider: 05/07/18    Clinic visit frequency required: Q 3 months     Future Office visit:     Controlled substance agreement on  file: Yes:  Date 12/21/17.     Processing:  Patient will  in clinic   checked in past 3 months?  Yes 07/11/18

## 2018-08-10 NOTE — TELEPHONE ENCOUNTER
Controlled Substance Refill Request for KASHDERALL  Problem List Complete:  Yes  Problem Detail      Noted:  9/8/2017      Priority:  Medium      Overview Addendum 7/11/2018 10:32 AM by Haase, Susan Rachele, APRN CNP     Patient is followed by HAASE, SUSAN RACHELE for ongoing prescription of stimulants.  All refills should be approved by this provider, or covering partner.     Medication(s): adderall XR 30 mg every day. Adderall IR 10 mg every day  Maximum quantity per month: #30, #30  Clinic visit frequency required: Q 3 months      Controlled substance agreement on file: Yes  Neuropsych evaluation for ADD completed:  Yes, completed 8/22/2017, on file and diagnosis confirmed     Last [facility] website verification:  done on 7/11/2018   [email or web address]       Last Written Prescription Date:  7/11/18  Last Fill Quantity: 30,   # refills: 0    Last Office Visit with [facility] care provider: 5/7/18    Clinic visit frequency required: Q 3 months     Future Office visit:     Controlled substance agreement on file: Yes:  Date 8/22/17.     Processing:  Patient will  in clinic   checked in past 3 months?  Yes 7/11/18

## 2018-08-13 RX ORDER — DEXTROAMPHETAMINE SACCHARATE, AMPHETAMINE ASPARTATE MONOHYDRATE, DEXTROAMPHETAMINE SULFATE AND AMPHETAMINE SULFATE 7.5; 7.5; 7.5; 7.5 MG/1; MG/1; MG/1; MG/1
30 CAPSULE, EXTENDED RELEASE ORAL DAILY
Qty: 30 CAPSULE | Refills: 0 | Status: CANCELLED | OUTPATIENT
Start: 2018-08-13

## 2018-08-13 RX ORDER — DEXTROAMPHETAMINE SACCHARATE, AMPHETAMINE ASPARTATE, DEXTROAMPHETAMINE SULFATE AND AMPHETAMINE SULFATE 2.5; 2.5; 2.5; 2.5 MG/1; MG/1; MG/1; MG/1
10 TABLET ORAL
Qty: 30 TABLET | Refills: 0 | Status: CANCELLED | OUTPATIENT
Start: 2018-08-13

## 2018-08-13 NOTE — TELEPHONE ENCOUNTER
Pt states that it was agreed upon to have a office visit every 6 months.  Pt is wondering why he needs on OV as it has not been 6 months.  SH - please advise. Shari Schoenberger, LAYO

## 2018-08-13 NOTE — TELEPHONE ENCOUNTER
He will need to come in for a visit, due for clinic visit every 3 months.  Thanks,  Susan Haase, CNP

## 2018-08-13 NOTE — TELEPHONE ENCOUNTER
Routing refill request to provider for review/approval because:  Drug not on the [facility] refill protocol   : 7.11.18  Annual Controlled substance agreement: 12.21.17  Annual drug screening: pos 12.5.17, neg 4.3.18    Jill Andre RN

## 2018-08-13 NOTE — TELEPHONE ENCOUNTER
Please see phone visit dated 4/4/2018,this is the message that was given to him at that time, I will need to see him in clinic.    Can you let Ren know that his urine drug screen is negative.  I have printed 3 months of prescriptions for him to .  I will need to see him back in clinic (not a phone visit) in 3 months for further refills.  Also let him know that if any future drug screens are positive I will not longer prescribe ADHD medications for him.  Thanks,  Susan Haase, CNP

## 2018-08-14 NOTE — TELEPHONE ENCOUNTER
Pt calls, see note below, pt informs talked to TC (Carlotta) this am and told pt  printed prescriptions, discussed at length, informed that was a copy of the April note confirming that pt would need to be seen in clinic now (not a phone visit), discussed with Carlotta and confirmed she talked to pt this am, pt insists on making phone visit, INFORMED SH would not be able to process adderall prescriptions as needs face to face visit, pt agrees, transferred to scheduling,  FY    Next 5 appointments (look out 90 days)     Aug 15, 2018 10:45 AM CDT   Telephone Visit with Susan Rachele Haase, APRN [facility] ([facility]    [address] [place] [id number] [phone]                Rolanda Tripp RN, BSN  Message handled by Nurse Triage.

## 2018-08-15 ENCOUNTER — VIRTUAL VISIT (OUTPATIENT)
Dept: FAMILY MEDICINE | Facility: CLINIC | Age: 23
End: 2018-08-15
Payer: COMMERCIAL

## 2018-08-15 DIAGNOSIS — F90.9 ATTENTION DEFICIT HYPERACTIVITY DISORDER (ADHD), UNSPECIFIED ADHD TYPE: Primary | ICD-10-CM

## 2018-08-15 PROCEDURE — 99441 ZZC PHYSICIAN TELEPHONE EVALUATION 5-10 MIN: CPT | Performed by: NURSE PRACTITIONER

## 2018-08-15 NOTE — MR AVS SNAPSHOT
After Visit Summary   8/15/2018    Ren Lawton    MRN: [id number]           Patient Information     Date Of Birth          1995        Visit Information        Provider Department      8/15/2018 10:45 AM Haase, Susan Rachele, APRN CNP [facility]        Today's Diagnoses     Attention deficit hyperactivity disorder (ADHD), unspecified ADHD type    -  1       Follow-ups after your visit        Follow-up notes from your care team     Return in about 2 days (around 8/17/2018) for Lab Work.      Your next 10 appointments already scheduled     Aug 17, 2018 11:30 AM CDT   LAB with CR LAB   [facility] ([facility]    [id number] [place] [id number] [phone]           Please do not eat 10-12 hours before your appointment if you are coming in fasting for labs on lipids, cholesterol, or glucose (sugar). This does not apply to pregnant women. Water, hot tea and black coffee (with nothing added) are okay. Do not drink other fluids, diet soda or chew gum.              Future tests that were ordered for you today     Open Future Orders        Priority Expected Expires Ordered    Drug  Screen Comprehensive , Urine with Reported Meds (MedTox) (Pain Care Package) Routine  8/29/2018 8/15/2018            Who to contact     If you have questions or need follow up information about today's clinic visit or your schedule please contact [facility] directly at [phone].  Normal or non-critical lab and imaging results will be communicated to you by MyChart, letter or phone within 4 business days after the clinic has received the results. If you do not hear from us within 7 days, please contact the clinic through MyChart or phone. If you have a critical or abnormal lab result, we will notify you by phone as soon as possible.  Submit refill requests through [employer] or call your pharmacy and they will forward the  "refill request to us. Please allow 3 business days for your refill to be completed.          Additional Information About Your Visit        MyChart Information     [employer] lets you send messages to your doctor, view your test results, renew your prescriptions, schedule appointments and more. To sign up, go to [email or web address] . Click on \"Log in\" on the left side of the screen, which will take you to the Welcome page. Then click on \"Sign up Now\" on the right side of the page.     You will be asked to enter the access code listed below, as well as some personal information. Please follow the directions to create your username and password.     Your access code is: [email or web address]  Expires: 2018 10:36 AM     Your access code will  in 90 days. If you need help or a new code, please call your [facility] clinic or [phone].        Care EveryWhere ID     This is your Care EveryWhere ID. This could be used by other organizations to access your [facility] medical records  [id number]         Blood Pressure from Last 3 Encounters:   18 142/78   18 133/70   18 132/68    Weight from Last 3 Encounters:   18 199 lb (90.3 kg)   18 193 lb 14.4 oz (88 kg)   18 199 lb (90.3 kg)                 Today's Medication Changes          These changes are accurate as of 8/15/18  1:04 PM.  If you have any questions, ask your nurse or doctor.               These medicines have changed or have updated prescriptions.        Dose/Directions    amphetamine-dextroamphetamine 10 MG per tablet   Commonly known as:  ADDERALL   This may have changed:  Another medication with the same name was removed. Continue taking this medication, and follow the directions you see here.   Used for:  Attention deficit hyperactivity disorder (ADHD), unspecified ADHD type   Changed by:  Haase, Susan Rachele, APRN CNP        Dose:  10 mg   Take 1 tablet (10 mg) by mouth daily (with lunch)   Quantity:  30 tablet "   Refills:  0         Stop taking these medicines if you haven't already. Please contact your care team if you have questions.     ketotifen 0.025 % Soln ophthalmic solution   Commonly known as:  ZADITOR   Stopped by:  Haase, Susan Rachele, APRN CNP                    Primary Care Provider Office Phone # Fax #    Susan Rachele Haase, APRN [phone] [address] [facility] [address]        Equal Access to Services     TREVOR HALE : Hadii aad ku hadasho Soomaali, waaxda luqadaha, qaybta kaalmada adeegyada, waxay idiin hayaan adeeg kharash la'aasanty . So [facility] [phone].    ATENCIÓN: Si habla español, tiene a foley disposición servicios gratuitos de asistencia lingüística. [facility] [phone].    We comply with applicable federal civil rights laws and [place] laws. We do not discriminate on the basis of race, color, national origin, age, disability, sex, sexual orientation, or gender identity.            Thank you!     Thank you for choosing [facility]  for your care. Our goal is always to provide you with excellent care. Hearing back from our patients is one way we can continue to improve our services. Please take a few minutes to complete the written survey that you may receive in the mail after your visit with us. Thank you!             Your Updated Medication List - Protect others around you: Learn how to safely use, store and throw away your medicines at www.disposemymeds.org.          This list is accurate as of 8/15/18  1:04 PM.  Always use your most recent med list.                   Brand Name Dispense Instructions for use Diagnosis    amphetamine-dextroamphetamine 10 MG per tablet    ADDERALL    30 tablet    Take 1 tablet (10 mg) by mouth daily (with lunch)    Attention deficit hyperactivity disorder (ADHD), unspecified ADHD type       benzoyl peroxide 10 % topical gel     56 g    Apply topically At Bedtime    Other acne       fluticasone 50 MCG/ACT spray     FLONASE    1 Bottle    Spray 1-2 sprays into both nostrils daily    Acute seasonal allergic rhinitis, unspecified trigger       multivitamin, therapeutic Tabs tablet      Take 1 tablet by mouth daily

## 2018-08-15 NOTE — PROGRESS NOTES
"Ren Lawton is a 22 year old male who is being evaluated via a telephone visit.      The patient has been notified of following:     \"This telephone visit will be conducted via a call between you and your physician/provider. We have found that certain health care needs can be provided without the need for a physical exam.  This service lets us provide the care you need with a short phone conversation.  If a prescription is necessary we can send it directly to your pharmacy.  If lab work is needed we can place an order for that and you can then stop by our lab to have the test done at a later time.    We will bill your insurance company for this service.  Please check with your medical insurance if this type of visit is covered. You may be responsible for the cost of this type of visit if insurance coverage is denied.  The typical cost is $30 (10min), $59 (11-20min) and $85 (21-30min).  Most often these visits are shorter than 10 minutes.    If during the course of the call the physician/provider feels a telephone visit is not appropriate, you will not be charged for this service.\"       Consent has been obtained for this service by care team member: yes.   See the scanned image in the medical record.    Ren Lawton complains of  Consult      I have reviewed and updated the patient's Past Medical History, Social History, Family History and Medication List.    ALLERGIES  Cats and Pollen extract    Keturah Knight CMA   (MA signature)      SUBJECTIVE:   Ren Lawton is a 22 year old male     Chief Complaint   Patient presents with     Consult     Recheck Medication     30 MG XR WANTS TO DECREASE TO 20MG XR        HPI  ADHD Follow-Up    Date of last ADHD office visit: 3/8/18 Virtual visit  Status since last visit: Worse -wants to decrease dosage on the XR to 20mg, doesn't feel very good on the 30 mg.   Taking controlled (daily) medications as prescribed: Yes                         ADHD Medication     " Amphetamines Disp Start End    amphetamine-dextroamphetamine (ADDERALL XR) 30 MG per 24 hr capsule 30 capsule 7/11/2018     Sig - Route: Take 1 capsule (30 mg) by mouth daily - Oral    Class: Local Print    amphetamine-dextroamphetamine (ADDERALL) 10 MG per tablet 30 tablet 7/11/2018     Sig - Route: Take 1 tablet (10 mg) by mouth daily (with lunch) - Oral    Class: Local Print      Last visit in 4/2018, at that time urine drug screen was positive for THC, repeated urine drug screen was negative.  Taking adderall XR 30 mg every day, feels that his focus is too acute, also taking adderall 10 mg at noon        Co-Morbid Diagnosis: anxiety    Currently in counseling: No        Medication Benefits:   Controlled symptoms: Hyperactivity - motor restlessness, Attention span, Distractability, Finishing tasks and Impulse control  Uncontrolled Symptoms: None    Medication side effects:  Side effects noted: none          ROS  GENERAL: No fever, weight change, fatigue  Neuro: No headaches, tics, migraines, tremor  PSYCH: No history of depression or ODD, suicide attempts, cutting    PROBLEM LIST  Patient Active Problem List    Diagnosis Date Noted     Attention deficit hyperactivity disorder (ADHD), unspecified ADHD type 09/08/2017     Priority: Medium     Patient is followed by HAASE, SUSAN RACHELE for ongoing prescription of stimulants.  All refills should be approved by this provider, or covering partner.    Medication(s): amphetamine-dextroamphetamine (ADDERALL XR) 30 MG per 24 hr capsule am   amphetamine-dextroamphetamine (ADDERALL) 10 MG per tablet at lunch   Maximum quantity per month: #30, #30  Clinic visit frequency required: Q 3 months     Controlled substance agreement on file: Yes  Neuropsych evaluation for ADD completed:  Yes, completed 12.21.17, on file and diagnosis confirmed    Last [facility] website verification:  done on 7/11/2018   [email or web address]         Anxiety 05/31/2017     Priority: Medium      Intervertebral disc protrusion 06/08/2015     Priority: Medium     Lumbar foraminal stenosis 06/08/2015     Priority: Medium     Obesity 06/08/2015     Priority: Medium     GERD (gastroesophageal reflux disease) 04/21/2011     Priority: Medium      MEDICATIONS  Current Outpatient Prescriptions   Medication Sig Dispense Refill     amphetamine-dextroamphetamine (ADDERALL XR) 30 MG per 24 hr capsule Take 1 capsule (30 mg) by mouth daily 30 capsule 0     amphetamine-dextroamphetamine (ADDERALL) 10 MG per tablet Take 1 tablet (10 mg) by mouth daily (with lunch) 30 tablet 0     benzoyl peroxide 10 % topical gel Apply topically At Bedtime 56 g 9     fluticasone (FLONASE) 50 MCG/ACT spray Spray 1-2 sprays into both nostrils daily 1 Bottle 11     multivitamin, therapeutic (THERA-VIT) TABS tablet Take 1 tablet by mouth daily        ALLERGIES  Allergies   Allergen Reactions     Cats      Pollen Extract        Reviewed and updated as needed this visit by clinical staff  Tobacco  Allergies  Meds  Med Hx  Surg Hx  Fam Hx  Soc Hx        Reviewed and updated as needed this visit by Provider       OBJECTIVE:   Phone visit.    ASSESSMENT/PLAN:   Ren was seen today for consult, recheck medication and consult.    Diagnoses and all orders for this visit:    Attention deficit hyperactivity disorder (ADHD), unspecified ADHD type  -     Drug  Screen Comprehensive , Urine with Reported Meds (MedTox) (Pain Care Package); Future    Will obtain urine drug screen (appt on 8/17) after getting results back will order adderall XR 20 mg and adderall 10 mg. Will provide 3 months of prescriptions.    Is in agreement with follow up in clinic in 3 months for ADHD check.       I have reviewed the note as documented above.  This accurately captures the substance of my conversation with the patient,  Ren Lawton    Total time of call between patient and provider was 8 minutes   FOLLOW UP: in 3 months, clinic visit is required.     Apurva  Haase, APRN CNP

## 2018-08-17 DIAGNOSIS — F90.9 ATTENTION DEFICIT HYPERACTIVITY DISORDER (ADHD), UNSPECIFIED ADHD TYPE: ICD-10-CM

## 2018-08-17 PROCEDURE — 80307 DRUG TEST PRSMV CHEM ANLYZR: CPT | Mod: 90 | Performed by: NURSE PRACTITIONER

## 2018-08-17 PROCEDURE — 99000 SPECIMEN HANDLING OFFICE-LAB: CPT | Performed by: NURSE PRACTITIONER

## 2018-08-20 ENCOUNTER — TELEPHONE (OUTPATIENT)
Dept: FAMILY MEDICINE | Facility: CLINIC | Age: 23
End: 2018-08-20

## 2018-08-20 DIAGNOSIS — F90.9 ATTENTION DEFICIT HYPERACTIVITY DISORDER (ADHD), UNSPECIFIED ADHD TYPE: ICD-10-CM

## 2018-08-20 RX ORDER — DEXTROAMPHETAMINE SACCHARATE, AMPHETAMINE ASPARTATE, DEXTROAMPHETAMINE SULFATE AND AMPHETAMINE SULFATE 2.5; 2.5; 2.5; 2.5 MG/1; MG/1; MG/1; MG/1
10 TABLET ORAL
Qty: 30 TABLET | Refills: 0 | Status: CANCELLED | OUTPATIENT
Start: 2018-08-20

## 2018-08-20 RX ORDER — DEXTROAMPHETAMINE SACCHARATE, AMPHETAMINE ASPARTATE MONOHYDRATE, DEXTROAMPHETAMINE SULFATE AND AMPHETAMINE SULFATE 7.5; 7.5; 7.5; 7.5 MG/1; MG/1; MG/1; MG/1
30 CAPSULE, EXTENDED RELEASE ORAL DAILY
Qty: 30 CAPSULE | Refills: 0 | Status: CANCELLED | OUTPATIENT
Start: 2018-08-20

## 2018-08-20 NOTE — TELEPHONE ENCOUNTER
Pt calls back, discussed below, reviewed past drug screen labs and final results 4 days, hopefully will get results soon, call dropped, pt aware, will confirm with SH plan    Rolanda Tripp RN, BSN  Message handled by Nurse Triage.

## 2018-08-20 NOTE — TELEPHONE ENCOUNTER
Mom calls, CTC on file, pt with her and also gives consent, informed lab not final, plan was  would provide rx's when lab final, would like first one walked over to our pharmacy to expedite, will fill others at [facility] as less expensive, will huddle with  plan, lab informs drug screen probably one week before final, will confirm plan with , they are aware SH out today, they did not seem aware that lab would take 1 week before final, wants rx asap, routed to , please confirm plan, route to inform of plan    Rolanda Tripp, RN, BSN  Message handled by Nurse Triage.

## 2018-08-22 LAB — PAIN DRUG SCR UR W RPTD MEDS: NORMAL

## 2018-08-23 RX ORDER — DEXTROAMPHETAMINE SACCHARATE, AMPHETAMINE ASPARTATE, DEXTROAMPHETAMINE SULFATE AND AMPHETAMINE SULFATE 2.5; 2.5; 2.5; 2.5 MG/1; MG/1; MG/1; MG/1
10 TABLET ORAL
Qty: 30 TABLET | Refills: 0 | Status: SHIPPED | OUTPATIENT
Start: 2018-10-23 | End: 2019-03-04

## 2018-08-23 RX ORDER — DEXTROAMPHETAMINE SACCHARATE, AMPHETAMINE ASPARTATE MONOHYDRATE, DEXTROAMPHETAMINE SULFATE AND AMPHETAMINE SULFATE 5; 5; 5; 5 MG/1; MG/1; MG/1; MG/1
20 CAPSULE, EXTENDED RELEASE ORAL DAILY
Qty: 30 CAPSULE | Refills: 0 | Status: SHIPPED | OUTPATIENT
Start: 2018-08-23 | End: 2018-08-23

## 2018-08-23 RX ORDER — DEXTROAMPHETAMINE SACCHARATE, AMPHETAMINE ASPARTATE MONOHYDRATE, DEXTROAMPHETAMINE SULFATE AND AMPHETAMINE SULFATE 5; 5; 5; 5 MG/1; MG/1; MG/1; MG/1
20 CAPSULE, EXTENDED RELEASE ORAL DAILY
Qty: 30 CAPSULE | Refills: 0 | Status: SHIPPED | OUTPATIENT
Start: 2018-10-23 | End: 2019-03-04

## 2018-08-23 RX ORDER — DEXTROAMPHETAMINE SACCHARATE, AMPHETAMINE ASPARTATE, DEXTROAMPHETAMINE SULFATE AND AMPHETAMINE SULFATE 2.5; 2.5; 2.5; 2.5 MG/1; MG/1; MG/1; MG/1
10 TABLET ORAL
Qty: 30 TABLET | Refills: 0 | Status: SHIPPED | OUTPATIENT
Start: 2018-08-23 | End: 2018-08-23

## 2018-08-23 RX ORDER — DEXTROAMPHETAMINE SACCHARATE, AMPHETAMINE ASPARTATE MONOHYDRATE, DEXTROAMPHETAMINE SULFATE AND AMPHETAMINE SULFATE 5; 5; 5; 5 MG/1; MG/1; MG/1; MG/1
20 CAPSULE, EXTENDED RELEASE ORAL DAILY
Qty: 30 CAPSULE | Refills: 0 | Status: SHIPPED | OUTPATIENT
Start: 2018-09-23 | End: 2018-08-23

## 2018-08-23 RX ORDER — DEXTROAMPHETAMINE SACCHARATE, AMPHETAMINE ASPARTATE, DEXTROAMPHETAMINE SULFATE AND AMPHETAMINE SULFATE 2.5; 2.5; 2.5; 2.5 MG/1; MG/1; MG/1; MG/1
10 TABLET ORAL
Qty: 30 TABLET | Refills: 0 | Status: SHIPPED | OUTPATIENT
Start: 2018-09-23 | End: 2018-08-23

## 2018-08-23 NOTE — TELEPHONE ENCOUNTER
Pt calls, informed drug screen final, pt anxious for rx's, first one wants walked over, other 2 at [facility]'s (need to change pharmacy),  see result (no drugs found?) inform pt when final    Rolanda Tripp RN, BSN  Message handled by Nurse Triage .

## 2018-08-23 NOTE — TELEPHONE ENCOUNTER
Informed of urine drug screen, last took adderall 5 days prior to screening.  Refill walked to our pharmacy, other prescriptions at the , Ren kaba.  Follow up in clinic in 3 months.  Susan Haase, CNP

## 2018-11-08 ENCOUNTER — HOSPITAL ENCOUNTER (EMERGENCY)
Facility: CLINIC | Age: 23
Discharge: HOME | End: 2018-11-08
Attending: EMERGENCY MEDICINE | Admitting: EMERGENCY MEDICINE
Payer: COMMERCIAL

## 2018-11-08 VITALS
TEMPERATURE: 97.4 F | RESPIRATION RATE: 22 BRPM | HEART RATE: 90 BPM | SYSTOLIC BLOOD PRESSURE: 119 MMHG | OXYGEN SATURATION: 98 % | DIASTOLIC BLOOD PRESSURE: 76 MMHG

## 2018-11-08 DIAGNOSIS — R55 VASOVAGAL NEAR SYNCOPE: ICD-10-CM

## 2018-11-08 LAB
ALBUMIN SERPL-MCNC: 3.8 G/DL (ref 3.4–5)
ALP SERPL-CCNC: 66 U/L (ref 40–150)
ALT SERPL W P-5'-P-CCNC: 43 U/L (ref 0–70)
ANION GAP SERPL CALCULATED.3IONS-SCNC: 10 MMOL/L (ref 3–14)
AST SERPL W P-5'-P-CCNC: 24 U/L (ref 0–45)
BASOPHILS # BLD AUTO: 0 10E9/L (ref 0–0.2)
BASOPHILS NFR BLD AUTO: 0.2 %
BILIRUB SERPL-MCNC: 0.3 MG/DL (ref 0.2–1.3)
BUN SERPL-MCNC: 18 MG/DL (ref 7–30)
CALCIUM SERPL-MCNC: 8.5 MG/DL (ref 8.5–10.1)
CHLORIDE SERPL-SCNC: 109 MMOL/L (ref 94–109)
CO2 SERPL-SCNC: 22 MMOL/L (ref 20–32)
CREAT SERPL-MCNC: 1.08 MG/DL (ref 0.66–1.25)
DIFFERENTIAL METHOD BLD: ABNORMAL
EOSINOPHIL # BLD AUTO: 0 10E9/L (ref 0–0.7)
EOSINOPHIL NFR BLD AUTO: 0.2 %
ERYTHROCYTE [DISTWIDTH] IN BLOOD BY AUTOMATED COUNT: 12.3 % (ref 10–15)
GFR SERPL CREATININE-BSD FRML MDRD: 86 ML/MIN/1.7M2
GLUCOSE SERPL-MCNC: 106 MG/DL (ref 70–99)
HCT VFR BLD AUTO: 49.6 % (ref 40–53)
HGB BLD-MCNC: 16.9 G/DL (ref 13.3–17.7)
IMM GRANULOCYTES # BLD: 0.3 10E9/L (ref 0–0.4)
IMM GRANULOCYTES NFR BLD: 1.7 %
LYMPHOCYTES # BLD AUTO: 1.9 10E9/L (ref 0.8–5.3)
LYMPHOCYTES NFR BLD AUTO: 11.6 %
MCH RBC QN AUTO: 30.3 PG (ref 26.5–33)
MCHC RBC AUTO-ENTMCNC: 34.1 G/DL (ref 31.5–36.5)
MCV RBC AUTO: 89 FL (ref 78–100)
MONOCYTES # BLD AUTO: 0.8 10E9/L (ref 0–1.3)
MONOCYTES NFR BLD AUTO: 4.6 %
NEUTROPHILS # BLD AUTO: 13.4 10E9/L (ref 1.6–8.3)
NEUTROPHILS NFR BLD AUTO: 81.7 %
NRBC # BLD AUTO: 0 10*3/UL
NRBC BLD AUTO-RTO: 0 /100
PLATELET # BLD AUTO: 260 10E9/L (ref 150–450)
POTASSIUM SERPL-SCNC: 3.5 MMOL/L (ref 3.4–5.3)
PROT SERPL-MCNC: 6.7 G/DL (ref 6.8–8.8)
RBC # BLD AUTO: 5.58 10E12/L (ref 4.4–5.9)
SODIUM SERPL-SCNC: 141 MMOL/L (ref 133–144)
WBC # BLD AUTO: 16.5 10E9/L (ref 4–11)

## 2018-11-08 PROCEDURE — 93005 ELECTROCARDIOGRAM TRACING: CPT

## 2018-11-08 PROCEDURE — 99284 EMERGENCY DEPT VISIT MOD MDM: CPT

## 2018-11-08 PROCEDURE — 85025 COMPLETE CBC W/AUTO DIFF WBC: CPT | Performed by: EMERGENCY MEDICINE

## 2018-11-08 PROCEDURE — 80053 COMPREHEN METABOLIC PANEL: CPT | Performed by: EMERGENCY MEDICINE

## 2018-11-08 ASSESSMENT — ENCOUNTER SYMPTOMS
ABDOMINAL PAIN: 1
VOMITING: 1
SORE THROAT: 0
LIGHT-HEADEDNESS: 1
CHILLS: 0
NAUSEA: 1
COLOR CHANGE: 1
SHORTNESS OF BREATH: 1
FEVER: 0
DIARRHEA: 0
DIZZINESS: 1

## 2018-11-08 NOTE — ED PROVIDER NOTES
History     Chief Complaint:  Allergic Reaction    HPI   Ren Lawton is a 23 year old male who presents with a possible allergic reaction via EMS. The patient reports that he woke up this morning with heartburn so he took a dose of Omeprazole. He went to take a shower when he began to feel nausea and light headed. The patient reports to having one emetic episode while in the shower. He states that he noticed that his eyes were red, tongue was swollen, felt short of breath, and felt itchy all over his body. He is concerned this is an allergic reaction to a medication he took. Last year, patient had a similar episode with syncope but they were unable to determine why he had these symptoms. En route, the patient received 25mg of Benadryl. The patient denies fever, chills, recent illness, congestion, sore throat, abdominal pain, or diarrhea. Here in the ED, the patient states his symptoms have resolved and is only complaining of epigastric pain.     Allergies:  NKDA    Medications:    Adderall    Past Medical History:    GERD  Anxiety    Past Surgical History:    [place]  Disectomy lumbar    Family History:    Diabetes    Social History:  Marital Status:  Single [1]  Former smoker  Alcohol use    Review of Systems   Constitutional: Negative for chills and fever.   HENT: Negative for congestion and sore throat.    Respiratory: Positive for shortness of breath.    Cardiovascular: Negative for chest pain.   Gastrointestinal: Positive for abdominal pain, nausea and vomiting. Negative for diarrhea.   Skin: Positive for color change.        Redness to face   Neurological: Positive for dizziness and light-headedness. Negative for syncope.   All other systems reviewed and are negative.    Physical Exam     Patient Vitals for the past 24 hrs:   BP Temp Temp src Pulse Heart Rate Resp SpO2   11/08/18 1500 137/84 - - - - - 99 %   11/08/18 1445 126/80 - - - - - 100 %   11/08/18 1430 118/85 - - - - - 98 %   11/08/18 1422  127/88 - - - - - 98 %   11/08/18 1417 (!) 143/112 97.4  F (36.3  C) Oral 90 90 22 98 %   11/08/18 1415 (!) 143/112 - - - - - -         Physical Exam  Nursing note and vitals reviewed.  Constitutional: Cooperative.   HENT:   Mouth/Throat: Moist mucous membranes. No oropharyngeal swelling.  Eyes: EOMI, nonicteric sclera  Cardiovascular: Normal rate, regular rhythm, no murmurs, rubs, or gallops  Pulmonary/Chest: Effort normal and breath sounds normal. No respiratory distress. No wheezes. No rales.   Abdominal: Soft. Nontender, nondistended, no guarding or rigidity. BS present.   Musculoskeletal: Normal range of motion.   Neurological: Alert. Moves all extremities spontaneously.   Skin: Skin is warm and dry. No rash noted.   Psychiatric: Normal mood and affect.     Emergency Department Course   ECG:  Indication: Light headed  Time: 1417  Vent. Rate 87 bpm. SD interval 140. QRS duration 98. QT/QTc 382/459. P-R-T axis 74 87 60.  Normal Sinus rhythm. Normal ECG. No significant change compared to EKG dated 1/6/18. Read time: 1437    Laboratory:  CBC: WBC: 16.5(H), HGB: 16.9, PLT: 260  CMP: Glucose 106(H), Protein Total 6.7(L), o/w WNL (Creatinine: 1.08)    Emergency Department Course:  Nursing notes and vitals reviewed. (0202) I performed an exam of the patient as documented above.     IV inserted. Medicine administered as documented above. Blood drawn. This was sent to the lab for further testing, results above.    EKG was done, interpretation as above.    (1386) I rechecked the patient and discussed the results of his workup thus far.     Findings and plan explained to the Patient. Patient discharged home with instructions regarding supportive care, medications, and reasons to return. The importance of close follow-up was reviewed.     I personally reviewed the laboratory results with the Patient and answered all related questions prior to discharge.     Impression & Plan    Medical Decision Making:  Ren Lawton is  a 23 year old male presents for evaluation of possible allergic reaction. Signs and symptoms are inconsistent with allergic reaction and are more likely due to vasovagal near syncope. This is most likely due to heartburn/reflux. Pt had similar episode almost 1 year prior. Pt/family concerned that it's the omeprazole pt took, but this seems more coincidental, since he was having heartburn for both episodes. EKG normal without signs of QT prolongation, brugada, or other arrhythmia. Symptomatically improved. He is in stable condition at the time of discharge, indications for return to the ED were discussed as well as follow up. All questions were answered and he is in agreement with the plan.      Diagnosis:    ICD-10-CM    1. Vasovagal near syncope R55        Disposition:  discharged to home    Scribe Disclosure:  I, Reina Daniels, am serving as a scribe on 11/8/2018 at 2:45 PM to personally document services performed by Nura Rene MD based on my observations and the provider's statements to me.       Reina Daniels  11/8/2018   [facility] [place] EMERGENCY DEPARTMENT     Nura Rene MD  11/11/18 0349

## 2018-11-08 NOTE — ED AVS SNAPSHOT
[facility] Emergency Department    [address]    [facility] [address]    Phone:  [phone]    Fax:  [phone]                                       Ren Lawton   MRN: [id number]    Department:  [facility] Emergency Department   Date of Visit:  11/8/2018           After Visit Summary Signature Page     I have received my discharge instructions, and my questions have been answered. I have discussed any challenges I see with this plan with the nurse or doctor.    ..........................................................................................................................................  Patient/Patient Representative Signature      ..........................................................................................................................................  Patient Representative Print Name and Relationship to Patient    ..................................................               ................................................  Date                                   Time    ..........................................................................................................................................  Reviewed by Signature/Title    ...................................................              ..............................................  Date                                               Time          22EPIC Rev 08/18

## 2018-11-08 NOTE — DISCHARGE INSTRUCTIONS
"  Near-Fainting: Vagal Reaction  Fainting (syncope) is a temporary loss of consciousness (passing out). It is associated with a loss of postural tone. Postural tone is the constant contraction of the muscles in your body to help keep your body upright. It also helps blood return towards the heart and brain. Syncope occurs when there is reduced blood flow to the brain due to this common vagal reaction. A vagal reaction is a reflex response that causes a sudden drop in your blood pressure, and your pulse to slow down. If the pulse is low enough, the blood pressure falls and causes fainting or near-fainting. Lying down usually stops the reaction very quickly.  These are symptoms of near-fainting:    Feeling lightheaded or like you are going to faint    Weak pulse    Nausea    Sweating    Blurred vision or feeling like your vision is \"blacking out\"    Palpitations    Chest pain    Trouble breathing    Cool and clammy skin  Causes for near-fainting include:    Sudden emotional stress like fear, pain, panic, sight of blood    Straining or overexertion, straining while using the toilet, coughing, sneezing    Standing up too quickly, or standing up for too long a time    Pregnancy  Home care  The following will help you care for yourself at home:    Rest today and go back to your normal activities as soon as you are feeling back to normal.    If you become light-headed or dizzy, lie down right away or sit with your head lowered between your knees.    Stay hydrated and do not skip meals.    Don't stand for long periods or stay in hot places    Do what you can to prevent constipation. If you bear down excessively when trying to have a bowel movement, this can trigger a vagal response  There may be other causes for a vagal response and near-syncope. For example, this can happen after open-heart surgery when the heart muscle is inflamed and irritated.  Check with your doctor to see if there is testing you need such as a " tilt-table test, heart rhythm monitoring, or blood tests. Review the medicines you take with your healthcare provider and pharmacist to be sure the symptoms you have are not a side effect of a medicine.  Follow-up care  Follow up with your healthcare provider, or as advised.   If you are having frequent episodes of near-syncope or vagal reactions, be cautious about activities such as driving that could harm yourself or others if you were to faint. Do not drive or operate heavy machinery if you are feeling like you may faint.  Call 911  Call 911 if any of these occur:    Another fainting spell occurs, and it is not explained by the common causes listed above    Fainting or loss of consciousness    Chest, arm, neck, jaw, back, or abdominal pain    Shortness of breath    Weakness, tingling, or numbness in one side of the face, one arm or leg    Slurred speech, confusion, trouble walking or seeing    Seizure    Blood in vomit or stools (black or red color)  When to seek medical advice  Call your healthcare provider right away if you have occasional mild lightheadedness, especially when standing up.  Date Last Reviewed: 6/1/2016 2000-2018 [employer]. [address] [facility], [place] [address]. All rights reserved. This information is not intended as a substitute for professional medical care. Always follow your healthcare professional's instructions.

## 2018-11-08 NOTE — ED NOTES
Bed: ED12  Expected date: 11/8/18  Expected time:   Means of arrival: Ambulance  Comments:  Ayaka Lance2

## 2018-11-08 NOTE — ED TRIAGE NOTES
"Patient brought in by EMS. Patient takes Adderall daily. Was having heartburn this AM and mother gave pt an Omeprazole. Patient then took a shower and began to feel nauseas, vomited x1, lightheaded. Patient worried he is having allergic reaction - states this has happened one year ago, came to the hospital and \"ran tests and they said they didn't know what happened\".     Received 25 mg Benadryl in [place].   "

## 2018-11-08 NOTE — ED AVS SNAPSHOT
" [facility] Emergency Department    [address]    [facility] [address]    Phone:  [phone]    Fax:  [phone]                                       Ren Lawton   MRN: [id number]    Department:  [facility] Emergency Department   Date of Visit:  11/8/2018           Patient Information     Date Of Birth          1995        Your diagnoses for this visit were:     Vasovagal near syncope        You were seen by Nura Rene MD.      Follow-up Information     Follow up with Haase, Susan Rachele, APRN CNP In 3 days.    Specialty:  Nurse Practitioner    Why:  for recheck     Contact information:    [address]  [facility] [address]  [phone]          Follow up with [facility] Emergency Department.    Specialty:  EMERGENCY MEDICINE    Why:  As needed, If symptoms worsen    Contact information:    [address]  [facility] [id number] [phone]        Discharge Instructions         Near-Fainting: Vagal Reaction  Fainting (syncope) is a temporary loss of consciousness (passing out). It is associated with a loss of postural tone. Postural tone is the constant contraction of the muscles in your body to help keep your body upright. It also helps blood return towards the heart and brain. Syncope occurs when there is reduced blood flow to the brain due to this common vagal reaction. A vagal reaction is a reflex response that causes a sudden drop in your blood pressure, and your pulse to slow down. If the pulse is low enough, the blood pressure falls and causes fainting or near-fainting. Lying down usually stops the reaction very quickly.  These are symptoms of near-fainting:    Feeling lightheaded or like you are going to faint    Weak pulse    Nausea    Sweating    Blurred vision or feeling like your vision is \"blacking out\"    Palpitations    Chest pain    Trouble breathing    Cool and clammy skin  Causes for " near-fainting include:    Sudden emotional stress like fear, pain, panic, sight of blood    Straining or overexertion, straining while using the toilet, coughing, sneezing    Standing up too quickly, or standing up for too long a time    Pregnancy  Home care  The following will help you care for yourself at home:    Rest today and go back to your normal activities as soon as you are feeling back to normal.    If you become light-headed or dizzy, lie down right away or sit with your head lowered between your knees.    Stay hydrated and do not skip meals.    Don't stand for long periods or stay in hot places    Do what you can to prevent constipation. If you bear down excessively when trying to have a bowel movement, this can trigger a vagal response  There may be other causes for a vagal response and near-syncope. For example, this can happen after open-heart surgery when the heart muscle is inflamed and irritated.  Check with your doctor to see if there is testing you need such as a tilt-table test, heart rhythm monitoring, or blood tests. Review the medicines you take with your healthcare provider and pharmacist to be sure the symptoms you have are not a side effect of a medicine.  Follow-up care  Follow up with your healthcare provider, or as advised.   If you are having frequent episodes of near-syncope or vagal reactions, be cautious about activities such as driving that could harm yourself or others if you were to faint. Do not drive or operate heavy machinery if you are feeling like you may faint.  Call 911  Call 911 if any of these occur:    Another fainting spell occurs, and it is not explained by the common causes listed above    Fainting or loss of consciousness    Chest, arm, neck, jaw, back, or abdominal pain    Shortness of breath    Weakness, tingling, or numbness in one side of the face, one arm or leg    Slurred speech, confusion, trouble walking or seeing    Seizure    Blood in vomit or stools  (black or red color)  When to seek medical advice  Call your healthcare provider right away if you have occasional mild lightheadedness, especially when standing up.  Date Last Reviewed: 6/1/2016 2000-2018 [employer]. [address] [place] [address], [place] [address]. All rights reserved. This information is not intended as a substitute for professional medical care. Always follow your healthcare professional's instructions.          Your next 10 appointments already scheduled     Nov 20, 2018  9:30 AM CST   SHORT with Susan Rachele Haase, APRN [facility] ([facility])    [address] [place] [id number] [phone]              24 Hour Appointment Hotline       To make an appointment at any [facility] call [phone] ([phone]). If you don't have a family doctor or clinic, we will help you find one. [facility] are conveniently located to serve the needs of you and your family.             Review of your medicines      Our records show that you are taking the medicines listed below. If these are incorrect, please call your family doctor or clinic.        Dose / Directions Last dose taken    * amphetamine-dextroamphetamine 20 MG per 24 hr capsule   Commonly known as:  ADDERALL XR   Dose:  20 mg   Quantity:  30 capsule        Take 1 capsule (20 mg) by mouth daily   Refills:  0        * amphetamine-dextroamphetamine 10 MG per tablet   Commonly known as:  ADDERALL   Dose:  10 mg   Quantity:  30 tablet        Take 1 tablet (10 mg) by mouth daily (with lunch)   Refills:  0        benzoyl peroxide 10 % topical gel   Quantity:  56 g        Apply topically At Bedtime   Refills:  9        fluticasone 50 MCG/ACT spray   Commonly known as:  FLONASE   Dose:  1-2 spray   Quantity:  1 Bottle        Spray 1-2 sprays into both nostrils daily   Refills:  11        multivitamin, therapeutic Tabs tablet   Dose:  1 tablet        Take 1  tablet by mouth daily   Refills:  0        * Notice:  This list has 2 medication(s) that are the same as other medications prescribed for you. Read the directions carefully, and ask your doctor or other care provider to review them with you.            Procedures and tests performed during your visit     CBC with platelets differential    Comprehensive metabolic panel    EKG 12 lead      Orders Needing Specimen Collection     None      Pending Results     Date and Time Order Name Status Description    11/8/2018 1422 EKG 12 lead Preliminary             Pending Culture Results     No orders found from 11/6/2018 to 11/9/2018.            Pending Results Instructions     If you had any lab results that were not finalized at the time of your Discharge, you can call the ED Lab Result RN at [phone]. You will be contacted by this team for any positive Lab results or changes in treatment. The nurses are available 7 days a week from 10A to 6:30P.  You can leave a message 24 hours per day and they will return your call.        Test Results From Your Hospital Stay        11/8/2018  3:16 PM      Component Results     Component Value Ref Range & Units Status    WBC 16.5 (H) 4.0 - 11.0 10e9/L Final    RBC Count 5.58 4.4 - 5.9 10e12/L Final    Hemoglobin 16.9 13.3 - 17.7 g/dL Final    Hematocrit 49.6 40.0 - 53.0 % Final    MCV 89 78 - 100 fl Final    MCH 30.3 26.5 - 33.0 pg Final    MCHC 34.1 31.5 - 36.5 g/dL Final    RDW 12.3 10.0 - 15.0 % Final    Platelet Count 260 150 - 450 10e9/L Final    Diff Method Automated Method  Final    % Neutrophils 81.7 % Final    % Lymphocytes 11.6 % Final    % Monocytes 4.6 % Final    % Eosinophils 0.2 % Final    % Basophils 0.2 % Final    % Immature Granulocytes 1.7 % Final    Nucleated RBCs 0 0 /100 Final    Absolute Neutrophil 13.4 (H) 1.6 - 8.3 10e9/L Final    Absolute Lymphocytes 1.9 0.8 - 5.3 10e9/L Final    Absolute Monocytes 0.8 0.0 - 1.3 10e9/L Final    Absolute Eosinophils 0.0 0.0 -  0.7 10e9/L Final    Absolute Basophils 0.0 0.0 - 0.2 10e9/L Final    Abs Immature Granulocytes 0.3 0 - 0.4 10e9/L Final    Absolute Nucleated RBC 0.0  Final         11/8/2018  3:35 PM      Component Results     Component Value Ref Range & Units Status    Sodium 141 133 - 144 mmol/L Final    Potassium 3.5 3.4 - 5.3 mmol/L Final    Chloride 109 94 - 109 mmol/L Final    Carbon Dioxide 22 20 - 32 mmol/L Final    Anion Gap 10 3 - 14 mmol/L Final    Glucose 106 (H) 70 - 99 mg/dL Final    Urea Nitrogen 18 7 - 30 mg/dL Final    Creatinine 1.08 0.66 - 1.25 mg/dL Final    GFR Estimate 86 >60 mL/min/1.7m2 Final    Non  GFR Calc    GFR Estimate If Black >90 >60 mL/min/1.7m2 Final    African American GFR Calc    Calcium 8.5 8.5 - 10.1 mg/dL Final    Bilirubin Total 0.3 0.2 - 1.3 mg/dL Final    Albumin 3.8 3.4 - 5.0 g/dL Final    Protein Total 6.7 (L) 6.8 - 8.8 g/dL Final    Alkaline Phosphatase 66 40 - 150 U/L Final    ALT 43 0 - 70 U/L Final    AST 24 0 - 45 U/L Final                Clinical Quality Measure: Blood Pressure Screening     Your blood pressure was checked while you were in the emergency department today. The last reading we obtained was  BP: 137/84 . Please read the guidelines below about what these numbers mean and what you should do about them.  If your systolic blood pressure (the top number) is less than 120 and your diastolic blood pressure (the bottom number) is less than 80, then your blood pressure is normal. There is nothing more that you need to do about it.  If your systolic blood pressure (the top number) is 120-139 or your diastolic blood pressure (the bottom number) is 80-89, your blood pressure may be higher than it should be. You should have your blood pressure rechecked within a year by a primary care provider.  If your systolic blood pressure (the top number) is 140 or greater or your diastolic blood pressure (the bottom number) is 90 or greater, you may have high blood pressure.  High blood pressure is treatable, but if left untreated over time it can put you at risk for heart attack, stroke, or kidney failure. You should have your blood pressure rechecked by a primary care provider within the next 4 weeks.  If your provider in the emergency department today gave you specific instructions to follow-up with your doctor or provider even sooner than that, you should follow that instruction and not wait for up to 4 weeks for your follow-up visit.        Thank you for choosing [facility]       Thank you for choosing [facility] for your care. Our goal is always to provide you with excellent care. Hearing back from our patients is one way we can continue to improve our services. Please take a few minutes to complete the written survey that you may receive in the mail after you visit with us. Thank you!        [employer] Information     [employer] gives you secure access to your electronic health record. If you see a primary care provider, you can also send messages to your care team and make appointments. If you have questions, please call your primary care clinic.  If you do not have a primary care provider, please call [phone] and they will assist you.        Care EveryWhere ID     This is your Care EveryWhere ID. This could be used by other organizations to access your [facility] medical records  [id number]        Equal Access to Services     KLARISSA HALE : Woody Mclean, mane nath, favio soliz, puma beard. So [facility] [phone].    ATENCIÓN: Si habla español, tiene a foley disposición servicios gratuitos de asistencia lingüística. Rissa al [phone].    We comply with applicable federal civil rights laws and [place] laws. We do not discriminate on the basis of race, color, national origin, age, disability, sex, sexual orientation, or gender identity.            After Visit Summary       This is your record. Keep this with you and  show to your community pharmacist(s) and doctor(s) at your next visit.

## 2018-11-09 LAB — INTERPRETATION ECG - MUSE: NORMAL

## 2019-03-04 ENCOUNTER — OFFICE VISIT (OUTPATIENT)
Dept: FAMILY MEDICINE | Facility: CLINIC | Age: 24
End: 2019-03-04
Payer: COMMERCIAL

## 2019-03-04 VITALS
SYSTOLIC BLOOD PRESSURE: 118 MMHG | RESPIRATION RATE: 12 BRPM | HEART RATE: 66 BPM | DIASTOLIC BLOOD PRESSURE: 70 MMHG | WEIGHT: 223 LBS | BODY MASS INDEX: 31.92 KG/M2 | OXYGEN SATURATION: 98 % | TEMPERATURE: 97.4 F | HEIGHT: 70 IN

## 2019-03-04 DIAGNOSIS — G47.00 INSOMNIA, UNSPECIFIED TYPE: ICD-10-CM

## 2019-03-04 DIAGNOSIS — F90.9 ATTENTION DEFICIT HYPERACTIVITY DISORDER (ADHD), UNSPECIFIED ADHD TYPE: ICD-10-CM

## 2019-03-04 DIAGNOSIS — M48.061 LUMBAR FORAMINAL STENOSIS: Primary | ICD-10-CM

## 2019-03-04 DIAGNOSIS — L70.9 ACNE, UNSPECIFIED ACNE TYPE: ICD-10-CM

## 2019-03-04 PROCEDURE — 99214 OFFICE O/P EST MOD 30 MIN: CPT | Performed by: NURSE PRACTITIONER

## 2019-03-04 RX ORDER — DOXYCYCLINE 100 MG/1
100 TABLET ORAL DAILY
Qty: 30 TABLET | Refills: 0 | Status: SHIPPED | OUTPATIENT
Start: 2019-03-04 | End: 2019-12-05

## 2019-03-04 RX ORDER — CLINDAMYCIN PHOSPHATE 10 MG/G
GEL TOPICAL 2 TIMES DAILY
Qty: 60 G | Refills: 3 | Status: SHIPPED | OUTPATIENT
Start: 2019-03-04 | End: 2020-03-26

## 2019-03-04 ASSESSMENT — ANXIETY QUESTIONNAIRES
5. BEING SO RESTLESS THAT IT IS HARD TO SIT STILL: NOT AT ALL
1. FEELING NERVOUS, ANXIOUS, OR ON EDGE: SEVERAL DAYS
GAD7 TOTAL SCORE: 7
4. TROUBLE RELAXING: NEARLY EVERY DAY
GAD7 TOTAL SCORE: 7
2. NOT BEING ABLE TO STOP OR CONTROL WORRYING: NOT AT ALL
6. BECOMING EASILY ANNOYED OR IRRITABLE: NOT AT ALL
7. FEELING AFRAID AS IF SOMETHING AWFUL MIGHT HAPPEN: NOT AT ALL
3. WORRYING TOO MUCH ABOUT DIFFERENT THINGS: NEARLY EVERY DAY
GAD7 TOTAL SCORE: 7
7. FEELING AFRAID AS IF SOMETHING AWFUL MIGHT HAPPEN: NOT AT ALL

## 2019-03-04 ASSESSMENT — PATIENT HEALTH QUESTIONNAIRE - PHQ9
SUM OF ALL RESPONSES TO PHQ QUESTIONS 1-9: 15
SUM OF ALL RESPONSES TO PHQ QUESTIONS 1-9: 15
10. IF YOU CHECKED OFF ANY PROBLEMS, HOW DIFFICULT HAVE THESE PROBLEMS MADE IT FOR YOU TO DO YOUR WORK, TAKE CARE OF THINGS AT HOME, OR GET ALONG WITH OTHER PEOPLE: EXTREMELY DIFFICULT

## 2019-03-04 ASSESSMENT — MIFFLIN-ST. JEOR: SCORE: 2022.77

## 2019-03-04 NOTE — PROGRESS NOTES
"  SUBJECTIVE:   Ren Lawton is a 23 year old male who presents to clinic today for the following health issues:      Musculoskeletal problem/pain      Duration: 6 months    Description  Location: back    Intensity:  Mild    Accompanying signs and symptoms: tightness    History  Previous similar problem: YES  Previous evaluation:  orthopedic evaluation    Precipitating or alleviating factors:  Trauma or overuse: no   Aggravating factors include: none    Therapies tried and outcome: nothing    Tightness in lower back, in shoulder blades and neck/jaw   Working full time at [employer]- lifting not as heavy   Requests PT for lower back tightness.  e     HPI  ADHD Follow-Up    Date of last ADHD office visit: 8/15/2018  Status since last visit: Worse due to not currently taking medication  Taking controlled (daily) medications as prescribed: Not currently taking                       Parent/Patient Concerns with Medications: does not like side effect to medication so stopped taking    ADHD-Patient reports that he does not like how \"up and down the medications make him feel and sometimes less focused without use\"  He would like to take something that is more consistent. Denies sleeping well with decreased energy. Has taken Wellbutrin  and decreased adder all dose  In the past, would like to try vyvanse.   Medication Benefits:   Controlled symptoms: Hyperactivity - motor restlessness and waiting   Uncontrolled symptoms:  Attention span, Distractability and Finishing tasks    Medication side effects:  Side effects noted: appetite suppression and insomnia, emotional roller coaster  Denies: palpitations    Acne - Patient would like something to help with his face acne. Is currently using St.Elvira-Apricot scrub and has used benzyl peroxide in the past.  Was unable to sleep ok     Insomnia:  trouble falling asleep and trouble staying asleep  Home/Family Concerns: None  Peer Concerns: None  Problem list and histories reviewed " & adjusted, as indicated.  Additional history: as documented    Patient Active Problem List   Diagnosis     GERD (gastroesophageal reflux disease)     Intervertebral disc protrusion     Lumbar foraminal stenosis     Obesity     Anxiety     Attention deficit hyperactivity disorder (ADHD), unspecified ADHD type     Past Surgical History:   Procedure Laterality Date     DENTAL SURGERY      wisdom teeth removed      DISCECTOMY LUMBAR POSTERIOR MICROSCOPIC ONE LEVEL Left 2018    Procedure: DISCECTOMY LUMBAR POSTERIOR MICROSCOPIC ONE LEVEL;  Left L5-S1 hemilaminectomy, medial facetectomy, foraminotomy with microdiscectomy and use of X-ray and intraoperative microscope;  Surgeon: Jus Benitez MD;  Location:  OR       Social History     Tobacco Use     Smoking status: Former Smoker     Packs/day: 0.50     Types: Cigarettes     Last attempt to quit: 2018     Years since quittin.8     Smokeless tobacco: Never Used   Substance Use Topics     Alcohol use: Yes     Comment: occas     Family History   Problem Relation Age of Onset     Diabetes Mother         gestational diabetes     Diabetes Paternal Grandmother        SHx: Works at [employer]  Current Outpatient Medications   Medication Sig Dispense Refill     clindamycin (CLINDAMAX) 1 % external gel Apply topically 2 times daily 60 g 3     doxycycline monohydrate (ADOXA) 100 MG tablet Take 1 tablet (100 mg) by mouth daily 30 tablet 0     fluticasone (FLONASE) 50 MCG/ACT spray Spray 1-2 sprays into both nostrils daily 1 Bottle 11     multivitamin, therapeutic (THERA-VIT) TABS tablet Take 1 tablet by mouth daily         Reviewed and updated as needed this visit by clinical staff       Reviewed and updated as needed this visit by Provider         ROS:  Constitutional, HEENT, cardiovascular, pulmonary, gi and gu systems are negative, except as otherwise noted.    This document serves as a record of the services and decisions personally performed and  "made by Susan Haase,APRN CNP. It was created on his behalf by Cleve Ann, a trained medical scribe. The creation of this document is based on the provider's statements to the medical scribe.  Cleve Ann March 4, 2019 12:25 PM      OBJECTIVE:     /70 (BP Location: Left arm, Patient Position: Chair, Cuff Size: Adult Large)   Pulse 66   Temp 97.4  F (36.3  C) (Oral)   Resp 12   Ht 1.778 m (5' 10\")   Wt 101.2 kg (223 lb)   SpO2 98%   BMI 32.00 kg/m    Body mass index is 32 kg/m .  GENERAL: healthy, alert   RESP: lungs clear to auscultation - no rales, rhonchi or wheezes  CV: regular rate and rhythm, normal S1 S2, no S3 or S4, no murmur, click or rub, no peripheral edema and peripheral pulses strong  NEURO: mentation intact and speech normal  PSYCH: mentation appears normal    Diagnostic Test Results:  No results found for this or any previous visit (from the past 24 hour(s)).    ASSESSMENT/PLAN:   Ren was seen today for musculoskeletal problem, recheck medication, sinus problem, derm problem and insomnia.    Diagnoses and all orders for this visit:    (M99.83) Lumbar foraminal stenosis  (primary encounter diagnosis)  Comment: Hx of back sugary,pt request  Plan: [facility] PT, HAND, AND CHIROPRACTIC REFERRAL          (F90.9) Attention deficit hyperactivity disorder (ADHD), unspecified ADHD type  Comment:Will need to complete and have a negative urine drug screen prior to prescribing vyvanse.  Plan: CANCELED: Drug abuse screen 8 urine (UR)          (L70.9) Acne, unspecified acne type  Comment: start   Plan: clindamycin (CLINDAMAX) 1 % external gel,         doxycycline monohydrate (ADOXA) 100 MG tablet          Follow up in 4 weeks after starting vyvanse, will not prescribe until after I get urine drug screen back.     The information in this document, created by the medical scribe for me, accurately reflects the services I personally performed and the decisions made by me. I have reviewed and approved this " document for accuracy prior to leaving the patient care area.  March 4, 2019 12:25 PM    Susan Haase, APRN [facility]

## 2019-03-05 ENCOUNTER — TELEPHONE (OUTPATIENT)
Dept: FAMILY MEDICINE | Facility: CLINIC | Age: 24
End: 2019-03-05

## 2019-03-05 DIAGNOSIS — F90.9 ATTENTION DEFICIT HYPERACTIVITY DISORDER (ADHD), UNSPECIFIED ADHD TYPE: ICD-10-CM

## 2019-03-05 DIAGNOSIS — F90.9 ATTENTION DEFICIT HYPERACTIVITY DISORDER (ADHD), UNSPECIFIED ADHD TYPE: Primary | ICD-10-CM

## 2019-03-05 PROCEDURE — 99000 SPECIMEN HANDLING OFFICE-LAB: CPT | Performed by: NURSE PRACTITIONER

## 2019-03-05 PROCEDURE — 80307 DRUG TEST PRSMV CHEM ANLYZR: CPT | Mod: 90 | Performed by: NURSE PRACTITIONER

## 2019-03-05 ASSESSMENT — PATIENT HEALTH QUESTIONNAIRE - PHQ9: SUM OF ALL RESPONSES TO PHQ QUESTIONS 1-9: 15

## 2019-03-05 ASSESSMENT — ANXIETY QUESTIONNAIRES: GAD7 TOTAL SCORE: 7

## 2019-03-05 NOTE — TELEPHONE ENCOUNTER
Pt calls, see visit yesterday, wants to come in later today for drug screen test, lab does not have order, discussed with lab, order placed per lab (usually one that they order), ONIEL to MAX Tripp RN, BSN  Message handled by Nurse Triage.

## 2019-03-07 ENCOUNTER — TELEPHONE (OUTPATIENT)
Dept: FAMILY MEDICINE | Facility: CLINIC | Age: 24
End: 2019-03-07

## 2019-03-07 DIAGNOSIS — F90.9 ATTENTION DEFICIT HYPERACTIVITY DISORDER (ADHD), UNSPECIFIED ADHD TYPE: Primary | ICD-10-CM

## 2019-03-07 NOTE — TELEPHONE ENCOUNTER
Patient called wants to know after Apurva looks at the UA Drug test that comes back , and the new medication being prescribed after that comes back,     Wondering if she can run it through his insurance to get it covered.     Waiting on Drug screening to come back     Carlotta Olmstead/SNOW

## 2019-03-08 NOTE — TELEPHONE ENCOUNTER
Will call Ren when his drug screen is back, if negative will prescribe vyvanse as discussed at his last visit on 3/4/2019.  Susan Haase, CNP

## 2019-03-09 LAB — COMPREHEN DRUG ANALYSIS UR: NORMAL

## 2019-03-11 ENCOUNTER — MYC MEDICAL ADVICE (OUTPATIENT)
Dept: FAMILY MEDICINE | Facility: CLINIC | Age: 24
End: 2019-03-11

## 2019-03-11 RX ORDER — LISDEXAMFETAMINE DIMESYLATE 20 MG/1
20 CAPSULE ORAL EVERY MORNING
Qty: 30 CAPSULE | Refills: 0 | Status: SHIPPED | OUTPATIENT
Start: 2019-03-11 | End: 2019-04-09 | Stop reason: ALTCHOICE

## 2019-03-11 NOTE — TELEPHONE ENCOUNTER
SH, see earlier encounter, pt anxious for vyvanse refill, drug screen negative, inform pt when final, can confirm which pharmacy to use, last time pt used Marquise Tripp RN, BSN  Message handled by Nurse Triage.

## 2019-03-11 NOTE — TELEPHONE ENCOUNTER
Patient was checking the status of his Rx request, labs are back please review and advise.  Magy Henson

## 2019-03-11 NOTE — TELEPHONE ENCOUNTER
Please let Ren know that the urine drug screen is negative, his order for vyvanse has been printed he can pick it up today.  Susan Haase, CNP

## 2019-04-01 ENCOUNTER — MYC REFILL (OUTPATIENT)
Dept: FAMILY MEDICINE | Facility: CLINIC | Age: 24
End: 2019-04-01

## 2019-04-01 DIAGNOSIS — F90.9 ATTENTION DEFICIT HYPERACTIVITY DISORDER (ADHD), UNSPECIFIED ADHD TYPE: ICD-10-CM

## 2019-04-01 RX ORDER — LISDEXAMFETAMINE DIMESYLATE 20 MG/1
20 CAPSULE ORAL EVERY MORNING
Qty: 30 CAPSULE | Refills: 0 | Status: CANCELLED | OUTPATIENT
Start: 2019-04-01

## 2019-04-02 NOTE — TELEPHONE ENCOUNTER
"To early, not due until 4/10, sent Bruxie response    \"Follow up in 4 weeks after starting vyvanse\"    Rolanda Tripp RN, BSN  Message handled by Nurse Triage.    "

## 2019-04-09 RX ORDER — DEXTROAMPHETAMINE SACCHARATE, AMPHETAMINE ASPARTATE, DEXTROAMPHETAMINE SULFATE AND AMPHETAMINE SULFATE 2.5; 2.5; 2.5; 2.5 MG/1; MG/1; MG/1; MG/1
10 TABLET ORAL DAILY
Qty: 30 TABLET | Refills: 0 | Status: SHIPPED | OUTPATIENT
Start: 2019-06-10 | End: 2019-12-05

## 2019-04-09 RX ORDER — DEXTROAMPHETAMINE SACCHARATE, AMPHETAMINE ASPARTATE MONOHYDRATE, DEXTROAMPHETAMINE SULFATE AND AMPHETAMINE SULFATE 5; 5; 5; 5 MG/1; MG/1; MG/1; MG/1
20 CAPSULE, EXTENDED RELEASE ORAL DAILY
Qty: 30 CAPSULE | Refills: 0 | Status: SHIPPED | OUTPATIENT
Start: 2019-05-10 | End: 2019-12-05

## 2019-04-09 RX ORDER — DEXTROAMPHETAMINE SACCHARATE, AMPHETAMINE ASPARTATE MONOHYDRATE, DEXTROAMPHETAMINE SULFATE AND AMPHETAMINE SULFATE 5; 5; 5; 5 MG/1; MG/1; MG/1; MG/1
20 CAPSULE, EXTENDED RELEASE ORAL DAILY
Qty: 30 CAPSULE | Refills: 0 | Status: SHIPPED | OUTPATIENT
Start: 2019-06-10 | End: 2019-12-05

## 2019-04-09 RX ORDER — DEXTROAMPHETAMINE SACCHARATE, AMPHETAMINE ASPARTATE MONOHYDRATE, DEXTROAMPHETAMINE SULFATE AND AMPHETAMINE SULFATE 5; 5; 5; 5 MG/1; MG/1; MG/1; MG/1
20 CAPSULE, EXTENDED RELEASE ORAL DAILY
Qty: 30 CAPSULE | Refills: 0 | Status: SHIPPED | OUTPATIENT
Start: 2019-04-09 | End: 2019-05-09

## 2019-04-09 RX ORDER — LISDEXAMFETAMINE DIMESYLATE 20 MG/1
20 CAPSULE ORAL EVERY MORNING
Qty: 30 CAPSULE | Refills: 0 | Status: CANCELLED | OUTPATIENT
Start: 2019-04-09

## 2019-04-09 RX ORDER — DEXTROAMPHETAMINE SACCHARATE, AMPHETAMINE ASPARTATE, DEXTROAMPHETAMINE SULFATE AND AMPHETAMINE SULFATE 2.5; 2.5; 2.5; 2.5 MG/1; MG/1; MG/1; MG/1
10 TABLET ORAL DAILY
Qty: 30 TABLET | Refills: 0 | Status: SHIPPED | OUTPATIENT
Start: 2019-04-09 | End: 2019-05-09

## 2019-04-09 RX ORDER — DEXTROAMPHETAMINE SACCHARATE, AMPHETAMINE ASPARTATE, DEXTROAMPHETAMINE SULFATE AND AMPHETAMINE SULFATE 2.5; 2.5; 2.5; 2.5 MG/1; MG/1; MG/1; MG/1
10 TABLET ORAL DAILY
Qty: 30 TABLET | Refills: 0 | Status: SHIPPED | OUTPATIENT
Start: 2019-05-10 | End: 2019-12-05

## 2019-04-09 NOTE — TELEPHONE ENCOUNTER
Spoke with patient, is doing well on the vyvanse but due to cost ($240 per month) would like to change back to adderall.  Will place prescriptions at the  for . Will need to follow up with a clinic visit in 3 months.   Susan Haase, CNP

## 2019-04-10 ENCOUNTER — TELEPHONE (OUTPATIENT)
Dept: FAMILY MEDICINE | Facility: CLINIC | Age: 24
End: 2019-04-10

## 2019-04-10 NOTE — TELEPHONE ENCOUNTER
Fax from [facility]'s, both doses of Adderall XR (20 mg and 10 mg) NEED PA OR RX CHANGE, routed to , please advise, route for processing    Pharmacy Benefit Information:    Provider: MAX  Phone #: [phone]  INS: [employer], [employer]  ID#: [id number]    Rolanda Tripp RN, BSN  Message handled by Nurse Triage.

## 2019-04-17 ENCOUNTER — TELEPHONE (OUTPATIENT)
Dept: FAMILY MEDICINE | Facility: CLINIC | Age: 24
End: 2019-04-17

## 2019-04-17 NOTE — TELEPHONE ENCOUNTER
ROUTED AGAIN TO PA POOL, CAN YOU START? SENT 4/10/19 AND NO RESPONSE  Rolanda Tripp RN, BSN  Message handled by Nurse Triage.

## 2019-04-17 NOTE — TELEPHONE ENCOUNTER
PA Initiation    Medication: amphetamine-dextroamphetamine (ADDERALL) 10 MG tablet  Insurance Company: [employer] - Phone [phone] Fax [phone]  Pharmacy Filling the Rx: [facility] [address] [place] [address] [facility]  Filling Pharmacy Phone: [phone]  Filling Pharmacy Fax:    Start Date: 4/17/2019    [facility] Prior Authorization Team   Phone: [phone]    Tracking Number is [id number]

## 2019-04-17 NOTE — TELEPHONE ENCOUNTER
PA Initiation    Medication: amphetamine-dextroamphetamine (ADDERALL XR) 20 MG 24 hr capsule  Insurance Company: [employer] - Phone [phone] Fax [phone]  Pharmacy Filling the Rx: [facility] [address] [place] [id number] [facility]  Filling Pharmacy Phone: [phone]  Filling Pharmacy Fax:    Start Date: 4/17/2019    Central Prior Authorization Team   Phone: [phone]    Tracking Number is [id number]

## 2019-04-17 NOTE — TELEPHONE ENCOUNTER
Please see telephone encounter created 04/17/19 for updates on Adderall 10mg. Separate encounter created as PA team can only document one medication per encounter.

## 2019-04-18 NOTE — TELEPHONE ENCOUNTER
Prior Authorization Approval    Authorization Effective Date: 4/18/2019  Authorization Expiration Date: 4/18/2021  Medication: amphetamine-dextroamphetamine (ADDERALL XR) 20 MG 24 hr capsule  Approved Dose/Quantity: 30  Reference #: [id number]    Insurance Company: [employer] - Phone [phone] Fax [phone]  Which Pharmacy is filling the prescription (Not needed for infusion/clinic administered): [facility] [address] [place] [address] [id number] [place] [address]  Pharmacy Notified: Yes per pharmacy patient has already picked up this medication  Patient Notified: Yes

## 2019-04-18 NOTE — TELEPHONE ENCOUNTER
Prior Authorization Approval    Authorization Effective Date: 4/18/2019  Authorization Expiration Date: 4/18/2021  Medication: amphetamine-dextroamphetamine (ADDERALL) 10 MG tablet  Approved Dose/Quantity: 30  Reference #: [id number]   Insurance Company: [employer] - Phone [phone] Fax [phone]  Which Pharmacy is filling the prescription (Not needed for infusion/clinic administered): [facility] [address] [place] [address] [id number] [place] [address]  Pharmacy Notified: Yes per pharmacy patient has already picked up this medication    Patient Notified: Yes

## 2019-09-03 ENCOUNTER — OFFICE VISIT (OUTPATIENT)
Dept: FAMILY MEDICINE | Facility: CLINIC | Age: 24
End: 2019-09-03
Payer: COMMERCIAL

## 2019-09-03 VITALS
BODY MASS INDEX: 34.01 KG/M2 | OXYGEN SATURATION: 97 % | DIASTOLIC BLOOD PRESSURE: 72 MMHG | TEMPERATURE: 98.5 F | SYSTOLIC BLOOD PRESSURE: 132 MMHG | HEART RATE: 101 BPM | WEIGHT: 237 LBS

## 2019-09-03 DIAGNOSIS — F90.9 ATTENTION DEFICIT HYPERACTIVITY DISORDER (ADHD), UNSPECIFIED ADHD TYPE: ICD-10-CM

## 2019-09-03 DIAGNOSIS — F90.2 ATTENTION DEFICIT HYPERACTIVITY DISORDER (ADHD), COMBINED TYPE: Primary | ICD-10-CM

## 2019-09-03 DIAGNOSIS — J30.2 SEASONAL ALLERGIC RHINITIS, UNSPECIFIED TRIGGER: ICD-10-CM

## 2019-09-03 PROCEDURE — 80307 DRUG TEST PRSMV CHEM ANLYZR: CPT | Mod: 90 | Performed by: NURSE PRACTITIONER

## 2019-09-03 PROCEDURE — 99213 OFFICE O/P EST LOW 20 MIN: CPT | Performed by: NURSE PRACTITIONER

## 2019-09-03 PROCEDURE — 99000 SPECIMEN HANDLING OFFICE-LAB: CPT | Performed by: NURSE PRACTITIONER

## 2019-09-03 RX ORDER — DEXTROAMPHETAMINE SACCHARATE, AMPHETAMINE ASPARTATE MONOHYDRATE, DEXTROAMPHETAMINE SULFATE AND AMPHETAMINE SULFATE 6.25; 6.25; 6.25; 6.25 MG/1; MG/1; MG/1; MG/1
25 CAPSULE, EXTENDED RELEASE ORAL DAILY
Qty: 30 CAPSULE | Refills: 0 | Status: SHIPPED | OUTPATIENT
Start: 2019-10-04 | End: 2019-12-05

## 2019-09-03 RX ORDER — DEXTROAMPHETAMINE SACCHARATE, AMPHETAMINE ASPARTATE MONOHYDRATE, DEXTROAMPHETAMINE SULFATE AND AMPHETAMINE SULFATE 6.25; 6.25; 6.25; 6.25 MG/1; MG/1; MG/1; MG/1
25 CAPSULE, EXTENDED RELEASE ORAL DAILY
Qty: 30 CAPSULE | Refills: 0 | Status: SHIPPED | OUTPATIENT
Start: 2019-11-04 | End: 2019-12-02

## 2019-09-03 RX ORDER — DEXTROAMPHETAMINE SACCHARATE, AMPHETAMINE ASPARTATE MONOHYDRATE, DEXTROAMPHETAMINE SULFATE AND AMPHETAMINE SULFATE 6.25; 6.25; 6.25; 6.25 MG/1; MG/1; MG/1; MG/1
25 CAPSULE, EXTENDED RELEASE ORAL DAILY
Qty: 30 CAPSULE | Refills: 0 | Status: SHIPPED | OUTPATIENT
Start: 2019-09-03 | End: 2019-12-05

## 2019-09-03 RX ORDER — DEXTROAMPHETAMINE SACCHARATE, AMPHETAMINE ASPARTATE, DEXTROAMPHETAMINE SULFATE AND AMPHETAMINE SULFATE 2.5; 2.5; 2.5; 2.5 MG/1; MG/1; MG/1; MG/1
10 TABLET ORAL DAILY
Qty: 30 TABLET | Refills: 0 | Status: SHIPPED | OUTPATIENT
Start: 2019-09-03 | End: 2019-12-05

## 2019-09-03 RX ORDER — DEXTROAMPHETAMINE SACCHARATE, AMPHETAMINE ASPARTATE, DEXTROAMPHETAMINE SULFATE AND AMPHETAMINE SULFATE 2.5; 2.5; 2.5; 2.5 MG/1; MG/1; MG/1; MG/1
10 TABLET ORAL DAILY
Qty: 30 TABLET | Refills: 0 | Status: SHIPPED | OUTPATIENT
Start: 2019-10-04 | End: 2019-12-05

## 2019-09-03 RX ORDER — OLOPATADINE HYDROCHLORIDE 1 MG/ML
1 SOLUTION/ DROPS OPHTHALMIC 2 TIMES DAILY
Qty: 1 BOTTLE | Refills: 0 | Status: SHIPPED | OUTPATIENT
Start: 2019-09-03 | End: 2020-03-26

## 2019-09-03 RX ORDER — DEXTROAMPHETAMINE SACCHARATE, AMPHETAMINE ASPARTATE, DEXTROAMPHETAMINE SULFATE AND AMPHETAMINE SULFATE 2.5; 2.5; 2.5; 2.5 MG/1; MG/1; MG/1; MG/1
10 TABLET ORAL DAILY
Qty: 30 TABLET | Refills: 0 | Status: SHIPPED | OUTPATIENT
Start: 2019-11-04 | End: 2019-12-02

## 2019-09-03 NOTE — PROGRESS NOTES
Ren Lawton is a 23 year old male who presents to clinic today for the following health issues:    ADHD Follow-Up (Adult)  Concerns: ADHD - not taking Adderall x few months due to side effects and cost, vyvanse worked very well, is not covered by his insurance. Would again like to try adderall but at an increased dosage of 25 mg in the morning.   Changes since last visit: Stable  Taking controlled (daily) medications as prescribed: No - not taking  Sleep: no problems  Adult ADHD Self-Reporting form given to patient?:  No  Currently in counseling: No    Medication Benefits: helped with concentration, decreased fogginess    Medication Side Effects: None  Reports:  Ups and downs   Sleep Problems? no  ++++++++++++++++++++++++++++++++++++++++++++++++    Employer Concerns/Feedback: Stable  Coworker Concerns:   Stable  Home/Family Concerns: Stable    Also experiencing:   Itchy eyes and nasal drainage:  Taking sudafed during the day, continues to have allergy type symptoms.             Review of Systems   ROS COMP: CONSTITUTIONAL: NEGATIVE for fever, chills, change in weight  ENT/MOUTH: NEGATIVE for ear, mouth and throat problems  RESP: NEGATIVE for significant cough or SOB  CV: NEGATIVE for chest pain, palpitations or peripheral edema  NEURO: NEGATIVE for weakness, dizziness or paresthesias  PSYCHIATRIC: NEGATIVE for changes in mood or affect      Objective    BP (!) 142/74 (BP Location: Right arm, Patient Position: Sitting, Cuff Size: Adult Large)   Pulse 101   Temp 98.5  F (36.9  C) (Oral)   Wt 107.5 kg (237 lb)   SpO2 97%   BMI 34.01 kg/m    Body mass index is 34.01 kg/m .  Physical Exam   GENERAL: healthy, alert and no distress  HENT: ear canals and TM's normal, nose and mouth without ulcers or lesions  NECK: no adenopathy, no asymmetry, masses, or scars and thyroid normal to palpation  RESP: lungs clear to auscultation - no rales, rhonchi or wheezes  CV: regular rate and rhythm, normal S1 S2, no S3 or S4,  "no murmur, click or rub, no peripheral edema   NEURO: Normal strength and tone, mentation intact and speech normal  PSYCH: mentation appears normal, affect normal/bright          Assessment & Plan   Assessment      Plan  1. Attention deficit hyperactivity disorder (ADHD), combined type:  Drug screen pending,  Will increase adderall XR to 25 mg in morning, continue adderall 10 mg at noon.      - Drug  Screen Comprehensive, Urine w/o Reported Meds (Pain Care Package)  - amphetamine-dextroamphetamine (ADDERALL XR) 25 MG 24 hr capsule; Take 1 capsule (25 mg) by mouth daily  Dispense: 30 capsule; Refill: 0  - amphetamine-dextroamphetamine (ADDERALL XR) 25 MG 24 hr capsule; Take 1 capsule (25 mg) by mouth daily  Dispense: 30 capsule; Refill: 0  - amphetamine-dextroamphetamine (ADDERALL XR) 25 MG 24 hr capsule; Take 1 capsule (25 mg) by mouth daily  Dispense: 30 capsule; Refill: 0  - amphetamine-dextroamphetamine (ADDERALL) 10 MG tablet; Take 1 tablet (10 mg) by mouth daily  Dispense: 30 tablet; Refill: 0  - amphetamine-dextroamphetamine (ADDERALL) 10 MG tablet; Take 1 tablet (10 mg) by mouth daily  Dispense: 30 tablet; Refill: 0  - amphetamine-dextroamphetamine (ADDERALL) 10 MG tablet; Take 1 tablet (10 mg) by mouth daily  Dispense: 30 tablet; Refill: 0    2. Seasonal allergic rhinitis, unspecified trigger:  Continue sudafed, add zyrtec in evening.   - olopatadine (PATANOL) 0.1 % ophthalmic solution; Place 1 drop into both eyes 2 times daily  Dispense: 1 Bottle; Refill: 0    Tobacco Cessation:   reports that he has been smoking cigarettes.  He has been smoking about 0.10 packs per day. He has never used smokeless tobacco.  BMI:   Estimated body mass index is 34.01 kg/m  as calculated from the following:    Height as of 3/4/19: 1.778 m (5' 10\").    Weight as of this encounter: 107.5 kg (237 lb).       Return in about 3 months (around 12/3/2019) for Routine Visit, phone visit.    Susan Haase, APRN [facility] " [place]

## 2019-09-06 LAB — COMPREHEN DRUG ANALYSIS UR: NORMAL

## 2019-09-09 NOTE — RESULT ENCOUNTER NOTE
Travis Mcclure,  Your drug screen shows that your blood alcohol level was 0.028, a level of 0.08 is the legal limit.   Sincerely,     Susan Haase, CNP

## 2019-11-08 ENCOUNTER — HEALTH MAINTENANCE LETTER (OUTPATIENT)
Age: 24
End: 2019-11-08

## 2019-12-01 ENCOUNTER — TELEPHONE (OUTPATIENT)
Dept: FAMILY MEDICINE | Facility: CLINIC | Age: 24
End: 2019-12-01

## 2019-12-01 DIAGNOSIS — F90.2 ATTENTION DEFICIT HYPERACTIVITY DISORDER (ADHD), COMBINED TYPE: ICD-10-CM

## 2019-12-01 NOTE — TELEPHONE ENCOUNTER
Ren sent in a [email or web address] request for a phone appointment for a med check.    [facility]    Please contact patient if this can be a phone visit or not.

## 2019-12-02 RX ORDER — DEXTROAMPHETAMINE SACCHARATE, AMPHETAMINE ASPARTATE MONOHYDRATE, DEXTROAMPHETAMINE SULFATE AND AMPHETAMINE SULFATE 6.25; 6.25; 6.25; 6.25 MG/1; MG/1; MG/1; MG/1
25 CAPSULE, EXTENDED RELEASE ORAL DAILY
Qty: 30 CAPSULE | Refills: 0 | Status: SHIPPED | OUTPATIENT
Start: 2019-12-02 | End: 2019-12-05

## 2019-12-02 RX ORDER — DEXTROAMPHETAMINE SACCHARATE, AMPHETAMINE ASPARTATE, DEXTROAMPHETAMINE SULFATE AND AMPHETAMINE SULFATE 2.5; 2.5; 2.5; 2.5 MG/1; MG/1; MG/1; MG/1
10 TABLET ORAL DAILY
Qty: 30 TABLET | Refills: 0 | Status: SHIPPED | OUTPATIENT
Start: 2019-12-02 | End: 2019-12-05

## 2019-12-02 NOTE — TELEPHONE ENCOUNTER
Pt calling to check status of refill. He is out of medication, asking to  today if able. He is aware of phone visit on 12/5.    Sumanth Horta , [facility]  12/02/19 2:09 PM

## 2019-12-02 NOTE — TELEPHONE ENCOUNTER
PCP:    The Pt called back. He is scheduled for 12/5 for a phone visit.     Are you ok issuing a refill to get him out until his upcoming visit? The Pt was under the influence of alcohol at last drug screening.     He is out of his medication now. Checked the .

## 2019-12-02 NOTE — TELEPHONE ENCOUNTER
Pt calls, has phone visit 12/5, aware of CSA on file, routed to SH, please advise if can have small amount, routed, inform pt when final    Rolanda Tripp RN, BSN  Message handled by Nurse Triage.

## 2019-12-02 NOTE — TELEPHONE ENCOUNTER
Scheduled for 12/5 @ 2:20.  Tried to call patient voicemail box full.  Sending [employer] message to confirm or call back to r/s if it doesn't work .

## 2019-12-03 NOTE — TELEPHONE ENCOUNTER
Called the Pt to inform him that script has been signed. No answer, unable to leave message as mailbox is full. Please inform him if he calls back.     Deandra Bartlett RN -- [facility]

## 2019-12-05 ENCOUNTER — VIRTUAL VISIT (OUTPATIENT)
Dept: FAMILY MEDICINE | Facility: CLINIC | Age: 24
End: 2019-12-05
Payer: COMMERCIAL

## 2019-12-05 DIAGNOSIS — F90.2 ATTENTION DEFICIT HYPERACTIVITY DISORDER (ADHD), COMBINED TYPE: ICD-10-CM

## 2019-12-05 DIAGNOSIS — F90.9 ATTENTION DEFICIT HYPERACTIVITY DISORDER (ADHD), UNSPECIFIED ADHD TYPE: ICD-10-CM

## 2019-12-05 PROCEDURE — 99441 ZZC PHYSICIAN TELEPHONE EVALUATION 5-10 MIN: CPT | Performed by: NURSE PRACTITIONER

## 2019-12-05 RX ORDER — DEXTROAMPHETAMINE SACCHARATE, AMPHETAMINE ASPARTATE MONOHYDRATE, DEXTROAMPHETAMINE SULFATE AND AMPHETAMINE SULFATE 6.25; 6.25; 6.25; 6.25 MG/1; MG/1; MG/1; MG/1
25 CAPSULE, EXTENDED RELEASE ORAL DAILY
Qty: 30 CAPSULE | Refills: 0 | Status: SHIPPED | OUTPATIENT
Start: 2020-02-02 | End: 2020-03-26

## 2019-12-05 RX ORDER — DEXTROAMPHETAMINE SACCHARATE, AMPHETAMINE ASPARTATE MONOHYDRATE, DEXTROAMPHETAMINE SULFATE AND AMPHETAMINE SULFATE 6.25; 6.25; 6.25; 6.25 MG/1; MG/1; MG/1; MG/1
25 CAPSULE, EXTENDED RELEASE ORAL DAILY
Qty: 30 CAPSULE | Refills: 0 | Status: SHIPPED | OUTPATIENT
Start: 2020-01-02 | End: 2020-03-26

## 2019-12-05 RX ORDER — DEXTROAMPHETAMINE SACCHARATE, AMPHETAMINE ASPARTATE, DEXTROAMPHETAMINE SULFATE AND AMPHETAMINE SULFATE 2.5; 2.5; 2.5; 2.5 MG/1; MG/1; MG/1; MG/1
10 TABLET ORAL DAILY
Qty: 30 TABLET | Refills: 0 | Status: SHIPPED | OUTPATIENT
Start: 2020-01-02 | End: 2020-03-26

## 2019-12-05 RX ORDER — DEXTROAMPHETAMINE SACCHARATE, AMPHETAMINE ASPARTATE, DEXTROAMPHETAMINE SULFATE AND AMPHETAMINE SULFATE 2.5; 2.5; 2.5; 2.5 MG/1; MG/1; MG/1; MG/1
10 TABLET ORAL DAILY
Qty: 30 TABLET | Refills: 0 | Status: SHIPPED | OUTPATIENT
Start: 2020-02-02 | End: 2020-03-26

## 2019-12-05 ASSESSMENT — ANXIETY QUESTIONNAIRES
1. FEELING NERVOUS, ANXIOUS, OR ON EDGE: SEVERAL DAYS
2. NOT BEING ABLE TO STOP OR CONTROL WORRYING: NOT AT ALL
6. BECOMING EASILY ANNOYED OR IRRITABLE: NOT AT ALL
GAD7 TOTAL SCORE: 2
5. BEING SO RESTLESS THAT IT IS HARD TO SIT STILL: NOT AT ALL
IF YOU CHECKED OFF ANY PROBLEMS ON THIS QUESTIONNAIRE, HOW DIFFICULT HAVE THESE PROBLEMS MADE IT FOR YOU TO DO YOUR WORK, TAKE CARE OF THINGS AT HOME, OR GET ALONG WITH OTHER PEOPLE: SOMEWHAT DIFFICULT
3. WORRYING TOO MUCH ABOUT DIFFERENT THINGS: SEVERAL DAYS
7. FEELING AFRAID AS IF SOMETHING AWFUL MIGHT HAPPEN: NOT AT ALL

## 2019-12-05 ASSESSMENT — PATIENT HEALTH QUESTIONNAIRE - PHQ9
5. POOR APPETITE OR OVEREATING: NOT AT ALL
SUM OF ALL RESPONSES TO PHQ QUESTIONS 1-9: 5

## 2019-12-05 NOTE — PROGRESS NOTES
"Ren Lawton is a 24 year old male who is being evaluated via a billable telephone visit.      The patient has been notified of following:     \"This telephone visit will be conducted via a call between you and your physician/provider. We have found that certain health care needs can be provided without the need for a physical exam.  This service lets us provide the care you need with a short phone conversation.  If a prescription is necessary we can send it directly to your pharmacy.  If lab work is needed we can place an order for that and you can then stop by our lab to have the test done at a later time.    If during the course of the call the physician/provider feels a telephone visit is not appropriate, you will not be charged for this service.\"     Consent has been obtained for this service by 1 care team member: yes. See the scanned image in the medical record.    Ren Lawton complains of    Chief Complaint   Patient presents with     Telephone     Adhd follow up       I have reviewed and updated the patient's Past Medical History, Social History, Family History and Medication List.    ALLERGIES  Cats; Dog epithelium; and Pollen extract    Kira Rodriguez MA     (MA signature)    Additional provider notes: ADHD Follow-Up (Adult)  Concerns: None  Changes since last visit: Stable  Taking controlled (daily) medications as prescribed: Yes  Sleep: trouble falling asleep  Adult ADHD Self-Reporting form given to patient?:  No  Currently in counseling: Yes    Medication Benefits:   Controlled symptoms: Attention span and Mood  Uncontrolled symptoms:  None    Medication Side Effects:  Reports:  none  Sleep Problems? Yes Details: Hard to fall asleep  ++++++++++++++++++++++++++++++++++++++++++++++++  Employer Concerns/Feedback: Stable  Coworker Concerns:   Stable  Home/Family Concerns: Stable  Exercise:  Denies exercise during the winter months.   SUDHEER 7 score of 2, PHQ 9 score of " 5    Assessment/Plan:    Ren was seen today for telephone.    Diagnoses and all orders for this visit:    Attention deficit hyperactivity disorder (ADHD), combined type:  Refilled for 3 months (December refill sent on 12/2/2019), symptoms well controlled.   -     amphetamine-dextroamphetamine (ADDERALL) 10 MG tablet; Take 1 tablet (10 mg) by mouth daily  -     amphetamine-dextroamphetamine (ADDERALL XR) 25 MG 24 hr capsule; Take 1 capsule (25 mg) by mouth daily  -     amphetamine-dextroamphetamine (ADDERALL) 10 MG tablet; Take 1 tablet (10 mg) by mouth daily  -     amphetamine-dextroamphetamine (ADDERALL XR) 25 MG 24 hr capsule; Take 1 capsule (25 mg) by mouth daily    Other orders  -     REVIEW OF HEALTH MAINTENANCE PROTOCOL ORDERS    Follow up in 3 months for clinic visit, instructed to set up a visit today since clinic visits are booking out.    I have reviewed the note as documented above.  This accurately captures the substance of my conversation with the patient.  Ren Jered    Total time of call between patient and provider was 5 minutes   Susan Haase, CNP

## 2019-12-06 ASSESSMENT — ANXIETY QUESTIONNAIRES: GAD7 TOTAL SCORE: 2

## 2019-12-27 ENCOUNTER — TELEPHONE (OUTPATIENT)
Dept: FAMILY MEDICINE | Facility: CLINIC | Age: 24
End: 2019-12-27

## 2019-12-27 NOTE — TELEPHONE ENCOUNTER
PCP:    Pharmacy called to report the Pt is requesting to refill his Adderall scripts dated 1/2/20 early.    Per the pharmacy he is leaving for out of town tomorrow morning. He is hoping to fill either today or tomorrow. (he leaves tomorrow)    Deandra Bartlett RN -- [facility]

## 2020-03-24 ENCOUNTER — TELEPHONE (OUTPATIENT)
Dept: FAMILY MEDICINE | Facility: CLINIC | Age: 25
End: 2020-03-24

## 2020-03-24 NOTE — TELEPHONE ENCOUNTER
General Call:   Who is calling:  Patient  Reason for Call:  Calling to schedule appt with Susan Haase for medication review. Offered to help schedule phone visit or offered pt to do evisit. Pt states that he usually needs to come in for UA and would like to know how to go about taking care of this appt. He didn't want to schedule anything with out checking with [facility].   What are your questions or concerns:  N/A  Date of last appointment with provider: phone visit on 12/5/196, OV on 9/3/19  Okay to leave a detailed message:No at Home number on file [phone] (home)       Tara Dubois-Patient Rep

## 2020-03-24 NOTE — TELEPHONE ENCOUNTER
Phone call to patient     He has been out of his ADHD medication for a while now, and knows he needs an office visit as last visit was virtual visit 12/5/2019     Advised due to Covid 19 precautions in place to schedule phone visit for now since out of medications and if pcp wants to complete lab only she will discuss on phone visit     Ariella Allison Registered Nurse   [facility]

## 2020-03-26 ENCOUNTER — VIRTUAL VISIT (OUTPATIENT)
Dept: FAMILY MEDICINE | Facility: CLINIC | Age: 25
End: 2020-03-26
Payer: COMMERCIAL

## 2020-03-26 DIAGNOSIS — F90.2 ATTENTION DEFICIT HYPERACTIVITY DISORDER (ADHD), COMBINED TYPE: Primary | ICD-10-CM

## 2020-03-26 DIAGNOSIS — J30.2 SEASONAL ALLERGIC RHINITIS, UNSPECIFIED TRIGGER: ICD-10-CM

## 2020-03-26 PROCEDURE — 99213 OFFICE O/P EST LOW 20 MIN: CPT | Mod: TEL | Performed by: NURSE PRACTITIONER

## 2020-03-26 RX ORDER — DEXTROAMPHETAMINE SACCHARATE, AMPHETAMINE ASPARTATE MONOHYDRATE, DEXTROAMPHETAMINE SULFATE AND AMPHETAMINE SULFATE 6.25; 6.25; 6.25; 6.25 MG/1; MG/1; MG/1; MG/1
25 CAPSULE, EXTENDED RELEASE ORAL DAILY
Qty: 30 CAPSULE | Refills: 0 | Status: SHIPPED | OUTPATIENT
Start: 2020-04-26 | End: 2020-08-28

## 2020-03-26 RX ORDER — DEXTROAMPHETAMINE SACCHARATE, AMPHETAMINE ASPARTATE MONOHYDRATE, DEXTROAMPHETAMINE SULFATE AND AMPHETAMINE SULFATE 6.25; 6.25; 6.25; 6.25 MG/1; MG/1; MG/1; MG/1
25 CAPSULE, EXTENDED RELEASE ORAL DAILY
Qty: 30 CAPSULE | Refills: 0 | Status: SHIPPED | OUTPATIENT
Start: 2020-06-26 | End: 2020-06-26

## 2020-03-26 RX ORDER — OLOPATADINE HYDROCHLORIDE 1 MG/ML
1 SOLUTION/ DROPS OPHTHALMIC 2 TIMES DAILY
Qty: 1 BOTTLE | Refills: 6 | Status: SHIPPED | OUTPATIENT
Start: 2020-03-26 | End: 2020-10-27

## 2020-03-26 RX ORDER — DEXTROAMPHETAMINE SACCHARATE, AMPHETAMINE ASPARTATE MONOHYDRATE, DEXTROAMPHETAMINE SULFATE AND AMPHETAMINE SULFATE 6.25; 6.25; 6.25; 6.25 MG/1; MG/1; MG/1; MG/1
25 CAPSULE, EXTENDED RELEASE ORAL DAILY
Qty: 30 CAPSULE | Refills: 0 | Status: SHIPPED | OUTPATIENT
Start: 2020-05-26 | End: 2020-08-28

## 2020-03-26 RX ORDER — DEXTROAMPHETAMINE SACCHARATE, AMPHETAMINE ASPARTATE, DEXTROAMPHETAMINE SULFATE AND AMPHETAMINE SULFATE 2.5; 2.5; 2.5; 2.5 MG/1; MG/1; MG/1; MG/1
10 TABLET ORAL DAILY
Qty: 30 TABLET | Refills: 0 | Status: SHIPPED | OUTPATIENT
Start: 2020-04-26 | End: 2020-08-28

## 2020-03-26 RX ORDER — DEXTROAMPHETAMINE SACCHARATE, AMPHETAMINE ASPARTATE, DEXTROAMPHETAMINE SULFATE AND AMPHETAMINE SULFATE 2.5; 2.5; 2.5; 2.5 MG/1; MG/1; MG/1; MG/1
10 TABLET ORAL DAILY
Qty: 30 TABLET | Refills: 0 | Status: SHIPPED | OUTPATIENT
Start: 2020-03-26 | End: 2020-08-28

## 2020-03-26 RX ORDER — DEXTROAMPHETAMINE SACCHARATE, AMPHETAMINE ASPARTATE, DEXTROAMPHETAMINE SULFATE AND AMPHETAMINE SULFATE 2.5; 2.5; 2.5; 2.5 MG/1; MG/1; MG/1; MG/1
10 TABLET ORAL DAILY
Qty: 30 TABLET | Refills: 0 | Status: SHIPPED | OUTPATIENT
Start: 2020-05-26 | End: 2020-08-28

## 2020-03-26 RX ORDER — DEXTROAMPHETAMINE SACCHARATE, AMPHETAMINE ASPARTATE, DEXTROAMPHETAMINE SULFATE AND AMPHETAMINE SULFATE 2.5; 2.5; 2.5; 2.5 MG/1; MG/1; MG/1; MG/1
10 TABLET ORAL DAILY
Qty: 30 TABLET | Refills: 0 | Status: SHIPPED | OUTPATIENT
Start: 2020-06-26 | End: 2020-06-26

## 2020-03-26 RX ORDER — DEXTROAMPHETAMINE SACCHARATE, AMPHETAMINE ASPARTATE MONOHYDRATE, DEXTROAMPHETAMINE SULFATE AND AMPHETAMINE SULFATE 6.25; 6.25; 6.25; 6.25 MG/1; MG/1; MG/1; MG/1
25 CAPSULE, EXTENDED RELEASE ORAL DAILY
Qty: 30 CAPSULE | Refills: 0 | Status: SHIPPED | OUTPATIENT
Start: 2020-03-26 | End: 2020-08-28

## 2020-03-26 NOTE — PROGRESS NOTES
"Ren Lawton is a 24 year old male who is being evaluated via a billable telephone visit.      The patient has been notified of following:     \"This telephone visit will be conducted via a call between you and your physician/provider. We have found that certain health care needs can be provided without the need for a physical exam.  This service lets us provide the care you need with a short phone conversation.  If a prescription is necessary we can send it directly to your pharmacy.  If lab work is needed we can place an order for that and you can then stop by our lab to have the test done at a later time.    If during the course of the call the physician/provider feels a telephone visit is not appropriate, you will not be charged for this service.\"     Ren Lawton complains of   Chief Complaint   Patient presents with     A.D.H.D       I have reviewed and updated the patient's Past Medical History, Social History, Family History and Medication List.    ALLERGIES  Cats; Dog epithelium; and Pollen extract  Has been off of adderall for 3 weeks, increased issues with concentration and finishing tasks.  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  Is not working at this time.  Would like to continue on adderall at the same dose.    Answers for HPI/ROS submitted by the patient on 3/26/2020   Chronic problems general questions HPI Form  How many servings of fruits and vegetables do you eat daily?: 2-3  On average, how many sweetened beverages do you drink each day (Examples: soda, juice, sweet tea, etc.  Do NOT count diet or artificially sweetened beverages)?: 2  How many minutes a day do you exercise enough to make your heart beat faster?: 10 to 19  How many days a week do you exercise enough to make your heart beat faster?: 3 or less  How many days per week do you miss taking your medication?: 2  What makes it hard for you to take your medication every day?: side " effects  ADHD Follow-Up (Adult)  Concerns: none - has been off meds for about a month  Changes since last visit: Worse  Taking controlled (daily) medications as prescribed: no - he is out  Sleep: trouble falling asleep and restless sleep  Currently in counseling: No    Medication Side Effects:  Reports:  none  Sleep Problems? no  ++++++++++++++++++++++++++++++++++++++++++++++++  Ren was seen today for a.d.h.d.    Diagnoses and all orders for this visit:    Attention deficit hyperactivity disorder (ADHD), combined type  Well controlled, refills given for 4 months. Will need a clinic visit in 7/2020.  -     amphetamine-dextroamphetamine (ADDERALL XR) 25 MG 24 hr capsule; Take 1 capsule (25 mg) by mouth daily  -     amphetamine-dextroamphetamine (ADDERALL) 10 MG tablet; Take 1 tablet (10 mg) by mouth daily  -     amphetamine-dextroamphetamine (ADDERALL XR) 25 MG 24 hr capsule; Take 1 capsule (25 mg) by mouth daily  -     amphetamine-dextroamphetamine (ADDERALL) 10 MG tablet; Take 1 tablet (10 mg) by mouth daily  -     amphetamine-dextroamphetamine (ADDERALL XR) 25 MG 24 hr capsule; Take 1 capsule (25 mg) by mouth daily  -     amphetamine-dextroamphetamine (ADDERALL) 10 MG tablet; Take 1 tablet (10 mg) by mouth daily  -     amphetamine-dextroamphetamine (ADDERALL XR) 25 MG 24 hr capsule; Take 1 capsule (25 mg) by mouth daily  -     amphetamine-dextroamphetamine (ADDERALL) 10 MG tablet; Take 1 tablet (10 mg) by mouth daily    Seasonal allergic rhinitis, unspecified trigger  -     olopatadine (PATANOL) 0.1 % ophthalmic solution; Place 1 drop into both eyes 2 times daily    Clinic visit in 7/2020.  Phone call duration:  10 minutes    Susan Haase, CNP

## 2020-06-26 ENCOUNTER — TELEPHONE (OUTPATIENT)
Dept: FAMILY MEDICINE | Facility: CLINIC | Age: 25
End: 2020-06-26

## 2020-06-26 DIAGNOSIS — F90.2 ATTENTION DEFICIT HYPERACTIVITY DISORDER (ADHD), COMBINED TYPE: ICD-10-CM

## 2020-06-26 RX ORDER — DEXTROAMPHETAMINE SACCHARATE, AMPHETAMINE ASPARTATE, DEXTROAMPHETAMINE SULFATE AND AMPHETAMINE SULFATE 2.5; 2.5; 2.5; 2.5 MG/1; MG/1; MG/1; MG/1
10 TABLET ORAL DAILY
Qty: 30 TABLET | Refills: 0 | Status: SHIPPED | OUTPATIENT
Start: 2020-06-26 | End: 2020-07-21

## 2020-06-26 RX ORDER — DEXTROAMPHETAMINE SACCHARATE, AMPHETAMINE ASPARTATE MONOHYDRATE, DEXTROAMPHETAMINE SULFATE AND AMPHETAMINE SULFATE 6.25; 6.25; 6.25; 6.25 MG/1; MG/1; MG/1; MG/1
25 CAPSULE, EXTENDED RELEASE ORAL DAILY
Qty: 30 CAPSULE | Refills: 0 | Status: SHIPPED | OUTPATIENT
Start: 2020-06-26 | End: 2020-07-21

## 2020-06-26 NOTE — TELEPHONE ENCOUNTER
Patient called stating was unable to fill adderall rx's. Called pharmacy because see that at last visit had four months filled with last fill starting today, pharmacy states can only keep 3 months on file and need new rx electronically sent because cannot take VO over the phone for controlled. Please send rx over asap, patient planning on leaving town at noon today.    Please route back to team after rx sent because patient would like a call when sent.    Kiesha ROJAS RN, BSN

## 2020-07-21 DIAGNOSIS — F90.2 ATTENTION DEFICIT HYPERACTIVITY DISORDER (ADHD), COMBINED TYPE: ICD-10-CM

## 2020-07-21 RX ORDER — DEXTROAMPHETAMINE SACCHARATE, AMPHETAMINE ASPARTATE MONOHYDRATE, DEXTROAMPHETAMINE SULFATE AND AMPHETAMINE SULFATE 6.25; 6.25; 6.25; 6.25 MG/1; MG/1; MG/1; MG/1
25 CAPSULE, EXTENDED RELEASE ORAL DAILY
Qty: 30 CAPSULE | Refills: 0 | Status: SHIPPED | OUTPATIENT
Start: 2020-07-21 | End: 2020-08-28

## 2020-07-21 RX ORDER — DEXTROAMPHETAMINE SACCHARATE, AMPHETAMINE ASPARTATE, DEXTROAMPHETAMINE SULFATE AND AMPHETAMINE SULFATE 2.5; 2.5; 2.5; 2.5 MG/1; MG/1; MG/1; MG/1
10 TABLET ORAL DAILY
Qty: 30 TABLET | Refills: 0 | Status: SHIPPED | OUTPATIENT
Start: 2020-07-21 | End: 2020-08-28

## 2020-07-21 NOTE — TELEPHONE ENCOUNTER
Medication Question or Refill  Who is calling: Ren Lawton - PT   What medication are you calling about (include dose and sig)?: amphetamine-dextroamphetamine (ADDERALL XR) 25 MG 24 hr capsule - Take 1 capsule (25 mg) by mouth daily  amphetamine-dextroamphetamine (ADDERALL) 10 MG tablet - Take 1 tablet (10 mg) by mouth daily  Controlled Substance Agreement on file: Yes: 12/21/2017  Who prescribed the medication?: Haase, Susan  Do you need a refill? Yes: Pt is scheduled for Med Check on 08/04/2020. This was rescheduled  When did you use the medication last? Today  Patient offered an appointment? Yes:   Do you have any questions or concerns?  Yes: Please contact the PT when the prescription has been sent to the pharmacy.  Requested Pharmacy: [facility] [address] [place] [id number] [facility]  [phone]  Okay to leave a detailed message?: Yes at Home number on file [phone] (home)

## 2020-08-28 ENCOUNTER — VIRTUAL VISIT (OUTPATIENT)
Dept: FAMILY MEDICINE | Facility: CLINIC | Age: 25
End: 2020-08-28
Payer: COMMERCIAL

## 2020-08-28 VITALS — BODY MASS INDEX: 32.2 KG/M2 | HEIGHT: 71 IN | WEIGHT: 230 LBS

## 2020-08-28 DIAGNOSIS — J30.2 SEASONAL ALLERGIC RHINITIS, UNSPECIFIED TRIGGER: ICD-10-CM

## 2020-08-28 DIAGNOSIS — F90.2 ATTENTION DEFICIT HYPERACTIVITY DISORDER (ADHD), COMBINED TYPE: Primary | ICD-10-CM

## 2020-08-28 PROCEDURE — 99213 OFFICE O/P EST LOW 20 MIN: CPT | Mod: 95 | Performed by: NURSE PRACTITIONER

## 2020-08-28 RX ORDER — DEXTROAMPHETAMINE SACCHARATE, AMPHETAMINE ASPARTATE, DEXTROAMPHETAMINE SULFATE AND AMPHETAMINE SULFATE 2.5; 2.5; 2.5; 2.5 MG/1; MG/1; MG/1; MG/1
10 TABLET ORAL DAILY
Qty: 30 TABLET | Refills: 0 | Status: SHIPPED | OUTPATIENT
Start: 2020-08-28 | End: 2020-10-27

## 2020-08-28 RX ORDER — DEXTROAMPHETAMINE SACCHARATE, AMPHETAMINE ASPARTATE MONOHYDRATE, DEXTROAMPHETAMINE SULFATE AND AMPHETAMINE SULFATE 7.5; 7.5; 7.5; 7.5 MG/1; MG/1; MG/1; MG/1
30 CAPSULE, EXTENDED RELEASE ORAL DAILY
Qty: 30 CAPSULE | Refills: 0 | Status: SHIPPED | OUTPATIENT
Start: 2020-08-28 | End: 2020-10-27

## 2020-08-28 RX ORDER — DEXTROAMPHETAMINE SACCHARATE, AMPHETAMINE ASPARTATE, DEXTROAMPHETAMINE SULFATE AND AMPHETAMINE SULFATE 2.5; 2.5; 2.5; 2.5 MG/1; MG/1; MG/1; MG/1
10 TABLET ORAL DAILY
Qty: 30 TABLET | Refills: 0 | Status: SHIPPED | OUTPATIENT
Start: 2020-10-28 | End: 2020-11-30

## 2020-08-28 RX ORDER — DEXTROAMPHETAMINE SACCHARATE, AMPHETAMINE ASPARTATE, DEXTROAMPHETAMINE SULFATE AND AMPHETAMINE SULFATE 2.5; 2.5; 2.5; 2.5 MG/1; MG/1; MG/1; MG/1
10 TABLET ORAL DAILY
Qty: 30 TABLET | Refills: 0 | Status: SHIPPED | OUTPATIENT
Start: 2020-09-28 | End: 2020-11-30

## 2020-08-28 RX ORDER — DEXTROAMPHETAMINE SACCHARATE, AMPHETAMINE ASPARTATE MONOHYDRATE, DEXTROAMPHETAMINE SULFATE AND AMPHETAMINE SULFATE 7.5; 7.5; 7.5; 7.5 MG/1; MG/1; MG/1; MG/1
30 CAPSULE, EXTENDED RELEASE ORAL DAILY
Qty: 30 CAPSULE | Refills: 0 | Status: SHIPPED | OUTPATIENT
Start: 2020-09-28 | End: 2020-11-30

## 2020-08-28 RX ORDER — DEXTROAMPHETAMINE SACCHARATE, AMPHETAMINE ASPARTATE MONOHYDRATE, DEXTROAMPHETAMINE SULFATE AND AMPHETAMINE SULFATE 7.5; 7.5; 7.5; 7.5 MG/1; MG/1; MG/1; MG/1
30 CAPSULE, EXTENDED RELEASE ORAL DAILY
Qty: 30 CAPSULE | Refills: 0 | Status: SHIPPED | OUTPATIENT
Start: 2020-10-28 | End: 2020-11-30

## 2020-08-28 RX ORDER — CETIRIZINE HYDROCHLORIDE 10 MG/1
10 TABLET ORAL DAILY
Qty: 30 TABLET | Refills: 5 | Status: SHIPPED | OUTPATIENT
Start: 2020-08-28 | End: 2020-11-30

## 2020-08-28 ASSESSMENT — MIFFLIN-ST. JEOR: SCORE: 2065.4

## 2020-08-28 NOTE — PROGRESS NOTES
"Ren Lawton is a 24 year old male who is being evaluated via a billable video visit.      The patient has been notified of following:     \"This video visit will be conducted via a call between you and your physician/provider. We have found that certain health care needs can be provided without the need for an in-person physical exam.  This service lets us provide the care you need with a video conversation.  If a prescription is necessary we can send it directly to your pharmacy.  If lab work is needed we can place an order for that and you can then stop by our lab to have the test done at a later time.    Video visits are billed at different rates depending on your insurance coverage.  Please reach out to your insurance provider with any questions.    If during the course of the call the physician/provider feels a video visit is not appropriate, you will not be charged for this service.\"    Patient has given verbal consent for Video visit? Yes  How would you like to obtain your AVS? MyChart  If you are dropped from the video visit, the video invite should be resent to: Text to cell phone: [phone]  Will anyone else be joining your video visit? No  Subjective     Ren Lawton is a 24 year old male who presents today via video visit for the following health issues:    HPI    Medication Followup of Adderall    Taking Medication as prescribed: yes    Side Effects:  None    Medication Helping Symptoms:  yes     ADHD Follow-Up (Adult)  Concerns: none  Changes since last visit: Stable  Taking controlled (daily) medications as prescribed: Yes  Sleep: no problems  Adult ADHD Self-Reporting form given to patient?:  No  Currently in counseling: No    Medication Benefits:   Controlled symptoms: Attention span, Distractability and Finishing tasks  Uncontrolled symptoms:  None    Medication Side Effects:  Reports:  none  Sleep Problems? no  ++++++++++++++++++++++++++++++++++++++++++++++++    Employer " Concerns/Feedback: Stable  Coworker Concerns:   Stable  Home/Family Concerns: Improving   Working full time at this time, 12 hours per day.   Taking adderall 25 mg XR, does not feel that he has the same level of focus as when he first started the medication.      Allergies:clear nasal drainage, sneezing and eyes watering. Using Patanol with some relief of eye issues. Also taking sudafed once daily for nasal drip. Works outside in lawn care.     Video Start Time:1330      Review of Systems   CONSTITUTIONAL: NEGATIVE for fever, chills, change in weight  EYES: NEGATIVE for vision changes, eyes are irritated, watery  ENT/MOUTH: NEGATIVE for ear, mouth and throat problems.  See HPI  RESP: NEGATIVE for significant cough or SOB  CV: NEGATIVE for chest pain, palpitations or peripheral edema  PSYCHIATRIC: NEGATIVE for changes in mood or affect      Objective           Vitals:  No vitals were obtained today due to virtual visit.    Physical Exam     GENERAL: Healthy, alert and no distress  EYES: Eyes grossly normal to inspection.  No discharge or erythema, or obvious scleral/conjunctival abnormalities.  RESP: No audible wheeze, cough, or visible cyanosis.  No visible retractions or increased work of breathing.    SKIN: Visible skin clear. No significant rash, abnormal pigmentation or lesions.  NEURO: Cranial nerves grossly intact.  Mentation and speech appropriate for age.  PSYCH: Mentation appears normal, affect normal/bright, judgement and insight intact, normal speech and appearance well-groomed.          Assessment & Plan     Attention deficit hyperactivity disorder (ADHD), combined type  Will increase XR dose from Adderall 25 mg to adderall 30 mg to see if focus improves.    - amphetamine-dextroamphetamine (ADDERALL XR) 30 MG 24 hr capsule  Dispense: 30 capsule; Refill: 0  - amphetamine-dextroamphetamine (ADDERALL) 10 MG tablet  Dispense: 30 tablet; Refill: 0  - amphetamine-dextroamphetamine (ADDERALL XR) 30 MG 24 hr  "capsule  Dispense: 30 capsule; Refill: 0  - amphetamine-dextroamphetamine (ADDERALL XR) 30 MG 24 hr capsule  Dispense: 30 capsule; Refill: 0  - amphetamine-dextroamphetamine (ADDERALL) 10 MG tablet  Dispense: 30 tablet; Refill: 0  - amphetamine-dextroamphetamine (ADDERALL) 10 MG tablet  Dispense: 30 tablet; Refill: 0    Seasonal allergic rhinitis, unspecified trigger: continue use of Patanol, add zyrtec daily.  - cetirizine (ZYRTEC) 10 MG tablet  Dispense: 30 tablet; Refill: 5       Tobacco Cessation:   reports that he has been smoking cigarettes. He has been smoking about 0.10 packs per day. He has never used smokeless tobacco.        BMI:   Estimated body mass index is 32.08 kg/m  as calculated from the following:    Height as of this encounter: 1.803 m (5' 11\").    Weight as of this encounter: 104.3 kg (230 lb).       Return in about 3 months (around 11/28/2020) for Routine Visit, ADHD in clinic.    Susan Haase, APRN CNP  [facility]      Video-Visit Details    Type of service:  Video Visit    Video End Time:1342    Originating Location (pt. Location): Home    Distant Location (provider location):  [facility]     Platform used for Video Visit: Endpoint Clinical      [facility] Appointments   Date Time Provider Department Center   8/28/2020  1:30 PM Haase, Susan Rachele, APRN CNP [facility]     Appointment Notes for this encounter:    reviewed Med check, ADHD    Health Maintenance Due   Topic Date Due     HPV IMMUNIZATION (1 - Male 2-dose series) 10/06/2008     HIV SCREENING  10/06/2012     PREVENTIVE CARE VISIT  07/26/2014     PNEUMOCOCCAL IMMUNIZATION 19-64 MEDIUM RISK (1 of 1 - PPSV23) 10/06/2016     DTAP/TDAP/TD IMMUNIZATION (7 - Td) 05/25/2020     INFLUENZA VACCINE (1) 09/01/2020     "

## 2020-10-26 DIAGNOSIS — F90.2 ATTENTION DEFICIT HYPERACTIVITY DISORDER (ADHD), COMBINED TYPE: ICD-10-CM

## 2020-10-26 DIAGNOSIS — J30.2 SEASONAL ALLERGIC RHINITIS, UNSPECIFIED TRIGGER: ICD-10-CM

## 2020-10-26 NOTE — TELEPHONE ENCOUNTER
Pt leaving town at 8:00am tomorrow-would like this filled early if possible.    Ria Alvarez/SNOW

## 2020-10-27 RX ORDER — DEXTROAMPHETAMINE SACCHARATE, AMPHETAMINE ASPARTATE, DEXTROAMPHETAMINE SULFATE AND AMPHETAMINE SULFATE 2.5; 2.5; 2.5; 2.5 MG/1; MG/1; MG/1; MG/1
10 TABLET ORAL DAILY
Qty: 30 TABLET | Refills: 0 | Status: SHIPPED | OUTPATIENT
Start: 2020-10-27 | End: 2020-11-30

## 2020-10-27 RX ORDER — OLOPATADINE HYDROCHLORIDE 1 MG/ML
1 SOLUTION/ DROPS OPHTHALMIC 2 TIMES DAILY
Qty: 1 BOTTLE | Refills: 6 | Status: SHIPPED | OUTPATIENT
Start: 2020-10-27 | End: 2021-06-16

## 2020-10-27 RX ORDER — DEXTROAMPHETAMINE SACCHARATE, AMPHETAMINE ASPARTATE MONOHYDRATE, DEXTROAMPHETAMINE SULFATE AND AMPHETAMINE SULFATE 7.5; 7.5; 7.5; 7.5 MG/1; MG/1; MG/1; MG/1
30 CAPSULE, EXTENDED RELEASE ORAL DAILY
Qty: 30 CAPSULE | Refills: 0 | Status: SHIPPED | OUTPATIENT
Start: 2020-10-27 | End: 2020-11-30

## 2020-10-27 NOTE — TELEPHONE ENCOUNTER
Routing refill request to provider for review/approval because:  Drug not on the [facility] refill protocol   Rolanda Tripp RN, BSN  Message handled by CLINIC NURSE.

## 2020-11-30 ENCOUNTER — VIRTUAL VISIT (OUTPATIENT)
Dept: FAMILY MEDICINE | Facility: CLINIC | Age: 25
End: 2020-11-30
Payer: COMMERCIAL

## 2020-11-30 DIAGNOSIS — J30.2 SEASONAL ALLERGIC RHINITIS, UNSPECIFIED TRIGGER: ICD-10-CM

## 2020-11-30 DIAGNOSIS — F90.2 ATTENTION DEFICIT HYPERACTIVITY DISORDER (ADHD), COMBINED TYPE: Primary | ICD-10-CM

## 2020-11-30 PROCEDURE — 99213 OFFICE O/P EST LOW 20 MIN: CPT | Mod: 95 | Performed by: NURSE PRACTITIONER

## 2020-11-30 RX ORDER — DEXTROAMPHETAMINE SACCHARATE, AMPHETAMINE ASPARTATE, DEXTROAMPHETAMINE SULFATE AND AMPHETAMINE SULFATE 2.5; 2.5; 2.5; 2.5 MG/1; MG/1; MG/1; MG/1
10 TABLET ORAL DAILY
Qty: 30 TABLET | Refills: 0 | Status: SHIPPED | OUTPATIENT
Start: 2021-01-29 | End: 2021-03-15

## 2020-11-30 RX ORDER — DEXTROAMPHETAMINE SACCHARATE, AMPHETAMINE ASPARTATE MONOHYDRATE, DEXTROAMPHETAMINE SULFATE AND AMPHETAMINE SULFATE 7.5; 7.5; 7.5; 7.5 MG/1; MG/1; MG/1; MG/1
30 CAPSULE, EXTENDED RELEASE ORAL DAILY
Qty: 30 CAPSULE | Refills: 0 | Status: SHIPPED | OUTPATIENT
Start: 2020-11-30 | End: 2021-03-15

## 2020-11-30 RX ORDER — DEXTROAMPHETAMINE SACCHARATE, AMPHETAMINE ASPARTATE MONOHYDRATE, DEXTROAMPHETAMINE SULFATE AND AMPHETAMINE SULFATE 7.5; 7.5; 7.5; 7.5 MG/1; MG/1; MG/1; MG/1
30 CAPSULE, EXTENDED RELEASE ORAL DAILY
Qty: 30 CAPSULE | Refills: 0 | Status: SHIPPED | OUTPATIENT
Start: 2021-01-29 | End: 2021-03-15

## 2020-11-30 RX ORDER — DEXTROAMPHETAMINE SACCHARATE, AMPHETAMINE ASPARTATE MONOHYDRATE, DEXTROAMPHETAMINE SULFATE AND AMPHETAMINE SULFATE 7.5; 7.5; 7.5; 7.5 MG/1; MG/1; MG/1; MG/1
30 CAPSULE, EXTENDED RELEASE ORAL DAILY
Qty: 30 CAPSULE | Refills: 0 | Status: SHIPPED | OUTPATIENT
Start: 2020-12-30 | End: 2021-03-15

## 2020-11-30 RX ORDER — DEXTROAMPHETAMINE SACCHARATE, AMPHETAMINE ASPARTATE, DEXTROAMPHETAMINE SULFATE AND AMPHETAMINE SULFATE 2.5; 2.5; 2.5; 2.5 MG/1; MG/1; MG/1; MG/1
10 TABLET ORAL DAILY
Qty: 30 TABLET | Refills: 0 | Status: SHIPPED | OUTPATIENT
Start: 2020-12-30 | End: 2021-03-15

## 2020-11-30 RX ORDER — DEXTROAMPHETAMINE SACCHARATE, AMPHETAMINE ASPARTATE, DEXTROAMPHETAMINE SULFATE AND AMPHETAMINE SULFATE 2.5; 2.5; 2.5; 2.5 MG/1; MG/1; MG/1; MG/1
10 TABLET ORAL DAILY
Qty: 30 TABLET | Refills: 0 | Status: SHIPPED | OUTPATIENT
Start: 2020-11-30 | End: 2021-03-15

## 2020-11-30 RX ORDER — CETIRIZINE HYDROCHLORIDE 10 MG/1
10 TABLET ORAL DAILY
Qty: 90 TABLET | Refills: 3 | Status: SHIPPED | OUTPATIENT
Start: 2020-11-30 | End: 2021-10-15

## 2020-11-30 NOTE — PROGRESS NOTES
"Ren Lawton is a 25 year old male who is being evaluated via a billable video visit.      The patient has been notified of following:     \"This video visit will be conducted via a call between you and your physician/provider. We have found that certain health care needs can be provided without the need for an in-person physical exam.  This service lets us provide the care you need with a video conversation.  If a prescription is necessary we can send it directly to your pharmacy.  If lab work is needed we can place an order for that and you can then stop by our lab to have the test done at a later time.    Video visits are billed at different rates depending on your insurance coverage.  Please reach out to your insurance provider with any questions.    If during the course of the call the physician/provider feels a video visit is not appropriate, you will not be charged for this service.\"    Patient has given verbal consent for Video visit? Yes  How would you like to obtain your AVS? MyChart  If you are dropped from the video visit, the video invite should be resent to: Send to e-mail at: [email or web address]  Will anyone else be joining your video visit? NoSubjective     Ren Lawton is a 25 year old male who presents today via video visit for the following health issues:    History of Present Illness       He eats 2-3 servings of fruits and vegetables daily.He consumes 2 sweetened beverage(s) daily.He exercises with enough effort to increase his heart rate 30 to 60 minutes per day.  He exercises with enough effort to increase his heart rate 4 days per week.   He is taking medications regularly.    ADHD Follow-Up (Adult)  Concerns: ADHD fu  Changes since last visit: Improving a little bit.  Taking controlled (daily) medications as prescribed: Yes  Sleep: no problems  Adult ADHD Self-Reporting form given to patient?:  No  Currently in counseling: No    Medication Benefits:   Controlled symptoms: " Attention span, Distractability and Finishing tasks  Uncontrolled symptoms:  None    Medication Side Effects:  Reports:  none  Sleep Problems? no  ++++++++++++++++++++++++++++++++++++++++++++++++    Employer Concerns/Feedback: None  Coworker Concerns:   None  Home/Family Concerns: None    Taking adderall XR 30 mg, dose increased which has been helpful. Also taking adderall 10 mg at noon.  Last refill was on 10/28/2020.    Getting some exercise outside mainly.         Future Appointments   Date Time Provider Department Center   11/30/2020  1:30 PM Haase, Susan Rachele, APRN CNP [facility]     Appointment Notes for this encounter:   Video visit med [facility] ph:[phone]    Health Maintenance Due   Topic Date Due     Pneumococcal Vaccine: Pediatrics (0 to 5 Years) and At-Risk Patients (6 to 64 Years) (1 of 1 - PPSV23) 10/06/2003     HPV IMMUNIZATION (1 - Male 2-dose series) 10/06/2008     HIV SCREENING  10/06/2012     PREVENTIVE CARE VISIT  07/26/2014     HEPATITIS C SCREENING  10/06/2015     DTAP/TDAP/TD IMMUNIZATION (7 - Td) 05/25/2020     INFLUENZA VACCINE (1) 09/01/2020     ANNUAL REVIEW OF HM ORDERS  12/05/2020      Health Maintenance addressed:  Immunizations and Physical     Immunizations Patient will  do them at his px that he is going to schedul on my chart    MyChart Status:  Active and Using  Video Start Time: 1337      Review of Systems   CONSTITUTIONAL: NEGATIVE for fever, chills, change in weight  RESP: NEGATIVE for significant cough or SOB  CV: NEGATIVE for chest pain, palpitations or peripheral edema  PSYCHIATRIC: NEGATIVE for changes in mood or affect      Objective           Vitals:  No vitals were obtained today due to virtual visit.    Physical Exam     GENERAL: Healthy, alert and no distress  EYES: Eyes grossly normal to inspection.  No discharge or erythema, or obvious scleral/conjunctival abnormalities.  RESP: No audible wheeze, cough, or visible cyanosis.  No visible retractions or increased work of  "breathing.    PSYCH: Mentation appears normal, affect normal/bright, judgement and insight intact, normal speech and appearance well-groomed.              Assessment & Plan     Attention deficit hyperactivity disorder (ADHD), combined type: symptoms well controlled without side effects.  Refill send for 3 months.    - amphetamine-dextroamphetamine (ADDERALL XR) 30 MG 24 hr capsule  Dispense: 30 capsule; Refill: 0  - amphetamine-dextroamphetamine (ADDERALL) 10 MG tablet  Dispense: 30 tablet; Refill: 0  - amphetamine-dextroamphetamine (ADDERALL) 10 MG tablet  Dispense: 30 tablet; Refill: 0  - amphetamine-dextroamphetamine (ADDERALL) 10 MG tablet  Dispense: 30 tablet; Refill: 0  - amphetamine-dextroamphetamine (ADDERALL XR) 30 MG 24 hr capsule  Dispense: 30 capsule; Refill: 0  - amphetamine-dextroamphetamine (ADDERALL XR) 30 MG 24 hr capsule  Dispense: 30 capsule; Refill: 0    Seasonal allergic rhinitis, unspecified trigger: refill, working well when takes on consistent basis.  - cetirizine (ZYRTEC) 10 MG tablet  Dispense: 90 tablet; Refill: 3       Tobacco Cessation:   reports that he has been smoking cigarettes. He has a 0.30 pack-year smoking history. He has never used smokeless tobacco.        BMI:   Estimated body mass index is 32.08 kg/m  as calculated from the following:    Height as of 8/28/20: 1.803 m (5' 11\").    Weight as of 8/28/20: 104.3 kg (230 lb).   Weight management plan: Discussed healthy diet and exercise guidelines             Return in about 3 months (around 2/28/2021) for Physical Exam. will need a clinic visit.     Susan Haase, APRN CNP  [facility] [employer]      Video-Visit Details    Type of service:  Video Visit    Video End Time:1348  Originating Location (pt. Location): Home    Distant Location (provider location):  [facility]     Platform used for Video Visit: [employer]"

## 2020-12-06 ENCOUNTER — HEALTH MAINTENANCE LETTER (OUTPATIENT)
Age: 25
End: 2020-12-06

## 2021-03-11 DIAGNOSIS — F90.2 ATTENTION DEFICIT HYPERACTIVITY DISORDER (ADHD), COMBINED TYPE: ICD-10-CM

## 2021-03-11 RX ORDER — DEXTROAMPHETAMINE SACCHARATE, AMPHETAMINE ASPARTATE, DEXTROAMPHETAMINE SULFATE AND AMPHETAMINE SULFATE 2.5; 2.5; 2.5; 2.5 MG/1; MG/1; MG/1; MG/1
10 TABLET ORAL DAILY
Qty: 30 TABLET | Refills: 0 | OUTPATIENT
Start: 2021-03-11

## 2021-03-11 RX ORDER — DEXTROAMPHETAMINE SACCHARATE, AMPHETAMINE ASPARTATE MONOHYDRATE, DEXTROAMPHETAMINE SULFATE AND AMPHETAMINE SULFATE 7.5; 7.5; 7.5; 7.5 MG/1; MG/1; MG/1; MG/1
30 CAPSULE, EXTENDED RELEASE ORAL DAILY
Qty: 30 CAPSULE | Refills: 0 | OUTPATIENT
Start: 2021-03-11

## 2021-03-11 NOTE — TELEPHONE ENCOUNTER
Reason for Call:  Medication or medication refill:    Do you use a [facility]?  Name of the pharmacy and phone number for the current request:  [facility] [phone]    Name of the medication requested:    amphetamine-dextroamphetamine (ADDERALL XR) 30 MG 24 hr capsule     Other request: [place] and would like to have this medication prior to that if possible. He has a video visit set up with Kaylene Geller on Monday 03/15    Can we leave a detailed message on this number? YES    Phone number patient can be reached at: Home number on file [phone] (home)    Best Time: Anytime    Call taken on 3/11/2021 at 8:41 AM by Genesis Gama

## 2021-03-11 NOTE — TELEPHONE ENCOUNTER
Routing refill request to provider for review/approval because:  Drug not on the [facility] refill protocol     Apurva Melendez RN on 3/11/2021 at 9:39 AM

## 2021-03-11 NOTE — TELEPHONE ENCOUNTER
Refused, needs every 3 mo appts, scheduled appt on 3/15, can be refilled at that time.  Susan Haase, CNP

## 2021-03-12 NOTE — TELEPHONE ENCOUNTER
Controlled Substance Refill Request for adderall  Problem List Complete:  Yes  Problem Detail      Noted:  9/8/2017      Priority:  Medium      Overview Signed 9/8/2017  9:09 PM by Haase, Susan Rachele, APRN CNP     Patient is followed by HAASE, SUSAN RACHELE for ongoing prescription of stimulants.  All refills should be approved by this provider, or covering partner.     Medication(s): adderall XR 20 mg every day.   Maximum quantity per month: #30  Clinic visit frequency required: Q 3 months      Controlled substance agreement on file: No  Neuropsych evaluation for ADD completed:  Yes, completed 8/22/2017, on file and diagnosis confirmed     Last [facility] website verification:  done on 9/8/2017   [email or web address]       Last Written Prescription Date:  10/11/17  Last Fill Quantity: 30,   # refills: 0    Last Office Visit with [facility] primary care provider: 8/8/17    Clinic visit frequency required: Q 3 months     Future Office visit:   Next 5 appointments (look out 90 days)     Dec 05, 2017 10:00 AM CST   SHORT [facility]le [facility]N [facility]    [address] [place] [id number] [phone]                  Controlled substance agreement on file: Yes:  Date 8/22/17.     Processing:  Patient will  in clinic     checked in past 6 months?  Yes 9/8/17  
He will need a clinic visit prior to further refills.  Last visit was virtual on 9/8/2017 was informed he would need a clinic visit in 3 months for refills.  Thanks,  Susan Haase, CNP  
Patient returned call. I advised patient of the notes below from . Patient states he has a scheduled OV for tomorrow 12/05/17 at 10:00.    Astrid Clemons  
no

## 2021-03-15 ENCOUNTER — VIRTUAL VISIT (OUTPATIENT)
Dept: FAMILY MEDICINE | Facility: CLINIC | Age: 26
End: 2021-03-15
Payer: COMMERCIAL

## 2021-03-15 DIAGNOSIS — F90.2 ATTENTION DEFICIT HYPERACTIVITY DISORDER (ADHD), COMBINED TYPE: Primary | ICD-10-CM

## 2021-03-15 PROCEDURE — 99213 OFFICE O/P EST LOW 20 MIN: CPT | Mod: 95 | Performed by: NURSE PRACTITIONER

## 2021-03-15 RX ORDER — DEXTROAMPHETAMINE SACCHARATE, AMPHETAMINE ASPARTATE, DEXTROAMPHETAMINE SULFATE AND AMPHETAMINE SULFATE 2.5; 2.5; 2.5; 2.5 MG/1; MG/1; MG/1; MG/1
10 TABLET ORAL DAILY
Qty: 30 TABLET | Refills: 0 | Status: SHIPPED | OUTPATIENT
Start: 2021-05-16 | End: 2021-06-16

## 2021-03-15 RX ORDER — DEXTROAMPHETAMINE SACCHARATE, AMPHETAMINE ASPARTATE MONOHYDRATE, DEXTROAMPHETAMINE SULFATE AND AMPHETAMINE SULFATE 7.5; 7.5; 7.5; 7.5 MG/1; MG/1; MG/1; MG/1
30 CAPSULE, EXTENDED RELEASE ORAL DAILY
Qty: 30 CAPSULE | Refills: 0 | Status: SHIPPED | OUTPATIENT
Start: 2021-05-16 | End: 2021-06-16

## 2021-03-15 RX ORDER — DEXTROAMPHETAMINE SACCHARATE, AMPHETAMINE ASPARTATE, DEXTROAMPHETAMINE SULFATE AND AMPHETAMINE SULFATE 2.5; 2.5; 2.5; 2.5 MG/1; MG/1; MG/1; MG/1
10 TABLET ORAL DAILY
Qty: 30 TABLET | Refills: 0 | Status: SHIPPED | OUTPATIENT
Start: 2021-03-15 | End: 2021-04-14

## 2021-03-15 RX ORDER — DEXTROAMPHETAMINE SACCHARATE, AMPHETAMINE ASPARTATE, DEXTROAMPHETAMINE SULFATE AND AMPHETAMINE SULFATE 2.5; 2.5; 2.5; 2.5 MG/1; MG/1; MG/1; MG/1
10 TABLET ORAL DAILY
Qty: 30 TABLET | Refills: 0 | Status: SHIPPED | OUTPATIENT
Start: 2021-04-15 | End: 2021-05-15

## 2021-03-15 RX ORDER — DEXTROAMPHETAMINE SACCHARATE, AMPHETAMINE ASPARTATE MONOHYDRATE, DEXTROAMPHETAMINE SULFATE AND AMPHETAMINE SULFATE 7.5; 7.5; 7.5; 7.5 MG/1; MG/1; MG/1; MG/1
30 CAPSULE, EXTENDED RELEASE ORAL DAILY
Qty: 30 CAPSULE | Refills: 0 | Status: SHIPPED | OUTPATIENT
Start: 2021-04-15 | End: 2021-05-15

## 2021-03-15 RX ORDER — DEXTROAMPHETAMINE SACCHARATE, AMPHETAMINE ASPARTATE MONOHYDRATE, DEXTROAMPHETAMINE SULFATE AND AMPHETAMINE SULFATE 7.5; 7.5; 7.5; 7.5 MG/1; MG/1; MG/1; MG/1
30 CAPSULE, EXTENDED RELEASE ORAL DAILY
Qty: 30 CAPSULE | Refills: 0 | Status: SHIPPED | OUTPATIENT
Start: 2021-03-15 | End: 2021-04-14

## 2021-03-15 NOTE — PROGRESS NOTES
Ren is a 25 year old who is being evaluated via a billable video visit.      How would you like to obtain your AVS? [employer]  If the video visit is dropped, the invitation should be resent by: Text to cell phone: connectiing via [employer]  Will anyone else be joining your video visit? No      Video Start Time: 1:29 PM    Assessment & Plan     Attention deficit hyperactivity disorder (ADHD), combined type  Controlled. Refilled x 3 months. Will follow-up in three months for F2F visit and annual exam.    record reviewed and is without red flags for controlled substance activity.   - amphetamine-dextroamphetamine (ADDERALL XR) 30 MG 24 hr capsule  Dispense: 30 capsule; Refill: 0  - amphetamine-dextroamphetamine (ADDERALL XR) 30 MG 24 hr capsule  Dispense: 30 capsule; Refill: 0  - amphetamine-dextroamphetamine (ADDERALL XR) 30 MG 24 hr capsule  Dispense: 30 capsule; Refill: 0  - amphetamine-dextroamphetamine (ADDERALL) 10 MG tablet  Dispense: 30 tablet; Refill: 0  - amphetamine-dextroamphetamine (ADDERALL) 10 MG tablet  Dispense: 30 tablet; Refill: 0  - amphetamine-dextroamphetamine (ADDERALL) 10 MG tablet  Dispense: 30 tablet; Refill: 0               Tobacco Cessation:   reports that he has been smoking cigarettes. He has a 0.30 pack-year smoking history. He has never used smokeless tobacco.          No follow-ups on file.    ANIRUDH Elder CNP [facility]   Ren is a 25 year old who presents for the following health issues     HPI     Medication Followup of Adderall    Taking Medication as prescribed: yes    Side Effects:  None    Medication Helping Symptoms:  yes       Adderall XR 30 mg and Adderall 10 mg on most days without side effects. Usually tried to not use medication on Sunday unless has need for event/task.   Good control of distractibility when used.   Appetite and sleep intact.   Afternoon dose used approximately 12-1 pm without issues.       Review of  Systems   Constitutional, HEENT, cardiovascular, pulmonary, gi and gu systems are negative, except as otherwise noted.      Objective           Vitals:  No vitals were obtained today due to virtual visit.    Physical Exam   GENERAL: Healthy, alert and no distress  EYES: Eyes grossly normal to inspection.  No discharge or erythema, or obvious scleral/conjunctival abnormalities.  RESP: No audible wheeze, cough, or visible cyanosis.  No visible retractions or increased work of breathing.    SKIN: Visible skin clear. No significant rash, abnormal pigmentation or lesions.  NEURO: Cranial nerves grossly intact.  Mentation and speech appropriate for age.  PSYCH: Mentation appears normal, affect normal/bright, judgement and insight intact, normal speech and appearance well-groomed.                Video-Visit Details    Type of service:  Video Visit    Video End Time:1:40 PM    Originating Location (pt. Location): Home    Distant Location (provider location):  [facility] [place]     Platform used for Video Visit: [employer]

## 2021-06-16 ENCOUNTER — VIRTUAL VISIT (OUTPATIENT)
Dept: FAMILY MEDICINE | Facility: CLINIC | Age: 26
End: 2021-06-16
Payer: COMMERCIAL

## 2021-06-16 DIAGNOSIS — J30.2 SEASONAL ALLERGIC RHINITIS, UNSPECIFIED TRIGGER: Primary | ICD-10-CM

## 2021-06-16 DIAGNOSIS — F90.2 ATTENTION DEFICIT HYPERACTIVITY DISORDER (ADHD), COMBINED TYPE: ICD-10-CM

## 2021-06-16 PROCEDURE — 99213 OFFICE O/P EST LOW 20 MIN: CPT | Mod: 95 | Performed by: NURSE PRACTITIONER

## 2021-06-16 RX ORDER — DEXTROAMPHETAMINE SACCHARATE, AMPHETAMINE ASPARTATE MONOHYDRATE, DEXTROAMPHETAMINE SULFATE AND AMPHETAMINE SULFATE 7.5; 7.5; 7.5; 7.5 MG/1; MG/1; MG/1; MG/1
30 CAPSULE, EXTENDED RELEASE ORAL DAILY
Qty: 30 CAPSULE | Refills: 0 | Status: SHIPPED | OUTPATIENT
Start: 2021-06-16 | End: 2021-10-15

## 2021-06-16 RX ORDER — DEXTROAMPHETAMINE SACCHARATE, AMPHETAMINE ASPARTATE MONOHYDRATE, DEXTROAMPHETAMINE SULFATE AND AMPHETAMINE SULFATE 7.5; 7.5; 7.5; 7.5 MG/1; MG/1; MG/1; MG/1
30 CAPSULE, EXTENDED RELEASE ORAL DAILY
Qty: 30 CAPSULE | Refills: 0 | Status: SHIPPED | OUTPATIENT
Start: 2021-08-16 | End: 2021-09-17

## 2021-06-16 RX ORDER — DEXTROAMPHETAMINE SACCHARATE, AMPHETAMINE ASPARTATE, DEXTROAMPHETAMINE SULFATE AND AMPHETAMINE SULFATE 2.5; 2.5; 2.5; 2.5 MG/1; MG/1; MG/1; MG/1
10 TABLET ORAL DAILY
Qty: 30 TABLET | Refills: 0 | Status: SHIPPED | OUTPATIENT
Start: 2021-08-16 | End: 2021-09-17

## 2021-06-16 RX ORDER — DEXTROAMPHETAMINE SACCHARATE, AMPHETAMINE ASPARTATE, DEXTROAMPHETAMINE SULFATE AND AMPHETAMINE SULFATE 2.5; 2.5; 2.5; 2.5 MG/1; MG/1; MG/1; MG/1
10 TABLET ORAL DAILY
Qty: 30 TABLET | Refills: 0 | Status: SHIPPED | OUTPATIENT
Start: 2021-07-16 | End: 2021-10-15

## 2021-06-16 RX ORDER — DEXTROAMPHETAMINE SACCHARATE, AMPHETAMINE ASPARTATE MONOHYDRATE, DEXTROAMPHETAMINE SULFATE AND AMPHETAMINE SULFATE 7.5; 7.5; 7.5; 7.5 MG/1; MG/1; MG/1; MG/1
30 CAPSULE, EXTENDED RELEASE ORAL DAILY
Qty: 30 CAPSULE | Refills: 0 | Status: SHIPPED | OUTPATIENT
Start: 2021-07-16 | End: 2021-10-15

## 2021-06-16 RX ORDER — DEXTROAMPHETAMINE SACCHARATE, AMPHETAMINE ASPARTATE, DEXTROAMPHETAMINE SULFATE AND AMPHETAMINE SULFATE 2.5; 2.5; 2.5; 2.5 MG/1; MG/1; MG/1; MG/1
10 TABLET ORAL DAILY
Qty: 30 TABLET | Refills: 0 | Status: SHIPPED | OUTPATIENT
Start: 2021-06-16 | End: 2021-10-15

## 2021-06-16 NOTE — PROGRESS NOTES
Ren is a 25 year old who is being evaluated via a billable video visit.      How would you like to obtain your AVS? MyChart  If the video visit is dropped, the invitation should be resent by: Text to cell phone: [phone]  Will anyone else be joining your video visit? No  Video Start Time: 1427    Assessment & Plan     Seasonal allergic rhinitis, unspecified trigger: patanol is not covered by insurance, will change to Zaditor.    - ketotifen (ZADITOR) 0.025 % ophthalmic solution; Place 1 drop into both eyes 2 times daily    Attention deficit hyperactivity disorder (ADHD), combined type:  Is tolerating medication as prescribed, helping with job stability/focus.  [place] monitoring, last prescription filled on 5/14/2021, last prescription filled by a covering provider in my absence.    - amphetamine-dextroamphetamine (ADDERALL XR) 30 MG 24 hr capsule; Take 1 capsule (30 mg) by mouth daily  - amphetamine-dextroamphetamine (ADDERALL) 10 MG tablet; Take 1 tablet (10 mg) by mouth daily  - amphetamine-dextroamphetamine (ADDERALL) 10 MG tablet; Take 1 tablet (10 mg) by mouth daily  - amphetamine-dextroamphetamine (ADDERALL XR) 30 MG 24 hr capsule; Take 1 capsule (30 mg) by mouth daily  - amphetamine-dextroamphetamine (ADDERALL) 10 MG tablet; Take 1 tablet (10 mg) by mouth daily  - amphetamine-dextroamphetamine (ADDERALL XR) 30 MG 24 hr capsule; Take 1 capsule (30 mg) by mouth daily    Return in about 3 months (around 9/16/2021) for Routine Visit,ADHD in clinic.    Susan Haase, APRN [facility]   Ren is a 25 year old who presents for the following health issues     History of Present Illness       He eats 2-3 servings of fruits and vegetables daily.He consumes 2 sweetened beverage(s) daily.He exercises with enough effort to increase his heart rate 30 to 60 minutes per day.  He exercises with enough effort to increase his heart rate 5 days per week.   He  is taking medications regularly.     ADHD Follow-Up (Adult)  Concerns: none  Changes since last visit: Stable  Taking controlled (daily) medications as prescribed: Yes  Sleep: no problems  Adult ADHD Self-Reporting form given to patient?:  No  Currently in counseling: No    Medication Benefits:   Controlled symptoms: Attention span and Distractability  Uncontrolled symptoms:  None    Medication Side Effects:  Reports:  dry mouth, drinks water with relief.    Takes melatonin for sleep  Taking adderall xr 30 mg in the morning and adderall 10 mg at noon, feels that this dose is helping greatly with focus.    Working in [employer].   ++++++++++++++++++++++++++++++++++++++++++++++++    Employer Concerns/Feedback: Stable      Review of Systems   CONSTITUTIONAL: NEGATIVE for fever, chills, change in weight  RESP: NEGATIVE for significant cough or SOB  CV: NEGATIVE for chest pain, palpitations or peripheral edema  NEURO: NEGATIVE for weakness, dizziness or paresthesias  PSYCHIATRIC: NEGATIVE for changes in mood or affect      Objective           Vitals:  No vitals were obtained today due to virtual visit.    Physical Exam   GENERAL: Healthy, alert and no distress  RESP: No audible wheeze, cough, or visible cyanosis.  No visible retractions or increased work of breathing.    SKIN: Visible skin clear. No significant rash, abnormal pigmentation or lesions.  PSYCH: Mentation appears normal, affect normal/bright, judgement and insight intact, normal speech and appearance well-groomed.                Video-Visit Details    Type of service:  Video Visit    Video End Time:1440  Originating Location (pt. Location): Home    Distant Location (provider location):  [facility] [place]     Platform used for Video Visit: [employer]

## 2021-08-12 ENCOUNTER — TELEPHONE (OUTPATIENT)
Dept: FAMILY MEDICINE | Facility: CLINIC | Age: 26
End: 2021-08-12

## 2021-08-12 NOTE — TELEPHONE ENCOUNTER
Medication Question or Refill    Who is calling: Patient    What medication are you calling about (include dose and sig)?: Adderall    Controlled Substance Agreement on file: Yes:     Who prescribed the medication?: Susan Haase    Do you need a refill? No    When did you use the medication last? daily    Patient offered an appointment? No    Do you have any questions or concerns?  Yes: Pt is wondering if he is able to start picking up his adderall RX 2 days prior to running out. He states he is unable to pick it up until the day he is due to take it which makes it hard since he works at 6:30 in the morning.     Requested Pharmacy: [facility]    Okay to leave a detailed message?: Yes at Cell number on file:    Telephone Information:   Mobile [phone]

## 2021-09-25 ENCOUNTER — HEALTH MAINTENANCE LETTER (OUTPATIENT)
Age: 26
End: 2021-09-25

## 2021-10-15 ENCOUNTER — OFFICE VISIT (OUTPATIENT)
Dept: FAMILY MEDICINE | Facility: CLINIC | Age: 26
End: 2021-10-15
Payer: COMMERCIAL

## 2021-10-15 VITALS
TEMPERATURE: 97.9 F | SYSTOLIC BLOOD PRESSURE: 139 MMHG | HEART RATE: 87 BPM | OXYGEN SATURATION: 99 % | HEIGHT: 71 IN | BODY MASS INDEX: 32.48 KG/M2 | DIASTOLIC BLOOD PRESSURE: 80 MMHG | WEIGHT: 232 LBS

## 2021-10-15 DIAGNOSIS — F90.2 ATTENTION DEFICIT HYPERACTIVITY DISORDER (ADHD), COMBINED TYPE: Primary | ICD-10-CM

## 2021-10-15 DIAGNOSIS — J30.2 SEASONAL ALLERGIC RHINITIS, UNSPECIFIED TRIGGER: ICD-10-CM

## 2021-10-15 DIAGNOSIS — D17.30 LIPOMA OF SKIN AND SUBCUTANEOUS TISSUE: ICD-10-CM

## 2021-10-15 LAB
AMPHETAMINES UR QL: DETECTED
BARBITURATES UR QL SCN: NOT DETECTED
BENZODIAZ UR QL SCN: NOT DETECTED
BUPRENORPHINE UR QL: NOT DETECTED
CANNABINOIDS UR QL: NOT DETECTED
COCAINE UR QL SCN: NOT DETECTED
D-METHAMPHET UR QL: NOT DETECTED
METHADONE UR QL SCN: NOT DETECTED
OPIATES UR QL SCN: NOT DETECTED
OXYCODONE UR QL SCN: NOT DETECTED
PCP UR QL SCN: NOT DETECTED
PROPOXYPH UR QL: NOT DETECTED
TRICYCLICS UR QL SCN: NOT DETECTED

## 2021-10-15 PROCEDURE — 90715 TDAP VACCINE 7 YRS/> IM: CPT | Performed by: NURSE PRACTITIONER

## 2021-10-15 PROCEDURE — 90471 IMMUNIZATION ADMIN: CPT | Performed by: NURSE PRACTITIONER

## 2021-10-15 PROCEDURE — 99214 OFFICE O/P EST MOD 30 MIN: CPT | Mod: 25 | Performed by: NURSE PRACTITIONER

## 2021-10-15 PROCEDURE — 80306 DRUG TEST PRSMV INSTRMNT: CPT | Performed by: NURSE PRACTITIONER

## 2021-10-15 RX ORDER — DEXTROAMPHETAMINE SACCHARATE, AMPHETAMINE ASPARTATE MONOHYDRATE, DEXTROAMPHETAMINE SULFATE AND AMPHETAMINE SULFATE 7.5; 7.5; 7.5; 7.5 MG/1; MG/1; MG/1; MG/1
30 CAPSULE, EXTENDED RELEASE ORAL DAILY
Qty: 30 CAPSULE | Refills: 0 | Status: SHIPPED | OUTPATIENT
Start: 2021-10-15 | End: 2021-12-20

## 2021-10-15 RX ORDER — DEXTROAMPHETAMINE SACCHARATE, AMPHETAMINE ASPARTATE MONOHYDRATE, DEXTROAMPHETAMINE SULFATE AND AMPHETAMINE SULFATE 7.5; 7.5; 7.5; 7.5 MG/1; MG/1; MG/1; MG/1
30 CAPSULE, EXTENDED RELEASE ORAL DAILY
Qty: 30 CAPSULE | Refills: 0 | Status: SHIPPED | OUTPATIENT
Start: 2021-11-15 | End: 2021-12-20

## 2021-10-15 RX ORDER — DEXTROAMPHETAMINE SACCHARATE, AMPHETAMINE ASPARTATE, DEXTROAMPHETAMINE SULFATE AND AMPHETAMINE SULFATE 2.5; 2.5; 2.5; 2.5 MG/1; MG/1; MG/1; MG/1
10 TABLET ORAL DAILY
Qty: 30 TABLET | Refills: 0 | Status: SHIPPED | OUTPATIENT
Start: 2021-11-15 | End: 2021-12-20

## 2021-10-15 RX ORDER — DEXTROAMPHETAMINE SACCHARATE, AMPHETAMINE ASPARTATE MONOHYDRATE, DEXTROAMPHETAMINE SULFATE AND AMPHETAMINE SULFATE 7.5; 7.5; 7.5; 7.5 MG/1; MG/1; MG/1; MG/1
30 CAPSULE, EXTENDED RELEASE ORAL DAILY
Qty: 30 CAPSULE | Refills: 0 | Status: SHIPPED | OUTPATIENT
Start: 2021-12-15 | End: 2021-12-20

## 2021-10-15 RX ORDER — DEXTROAMPHETAMINE SACCHARATE, AMPHETAMINE ASPARTATE, DEXTROAMPHETAMINE SULFATE AND AMPHETAMINE SULFATE 2.5; 2.5; 2.5; 2.5 MG/1; MG/1; MG/1; MG/1
10 TABLET ORAL DAILY
Qty: 30 TABLET | Refills: 0 | Status: SHIPPED | OUTPATIENT
Start: 2021-10-15 | End: 2021-12-20

## 2021-10-15 RX ORDER — DEXTROAMPHETAMINE SACCHARATE, AMPHETAMINE ASPARTATE, DEXTROAMPHETAMINE SULFATE AND AMPHETAMINE SULFATE 2.5; 2.5; 2.5; 2.5 MG/1; MG/1; MG/1; MG/1
10 TABLET ORAL DAILY
Qty: 30 TABLET | Refills: 0 | Status: SHIPPED | OUTPATIENT
Start: 2021-12-15 | End: 2021-12-20

## 2021-10-15 RX ORDER — CETIRIZINE HYDROCHLORIDE 10 MG/1
10 TABLET ORAL DAILY
Qty: 90 TABLET | Refills: 3 | Status: SHIPPED | OUTPATIENT
Start: 2021-10-15 | End: 2023-03-17

## 2021-10-15 ASSESSMENT — MIFFLIN-ST. JEOR: SCORE: 2064.48

## 2021-10-15 NOTE — PROGRESS NOTES
Assessment & Plan     Diagnoses and all orders for this visit:    Attention deficit hyperactivity disorder (ADHD), combined type:  Symptoms are well controlled on current dosage.  Reviewed and signed controlled substance agreement.  [place], last prescription filled on 2/8/2020, no prescriptions filled by other providers.   -     Drug Abuse Screen Panel 13, Urine (Pain Care Package) - lab collect; Future  -     Drug Abuse Screen Panel 13, Urine (Pain Care Package) - lab collect  -     amphetamine-dextroamphetamine (ADDERALL XR) 30 MG 24 hr capsule; Take 1 capsule (30 mg) by mouth daily  -     amphetamine-dextroamphetamine (ADDERALL) 10 MG tablet; Take 1 tablet (10 mg) by mouth daily  -     amphetamine-dextroamphetamine (ADDERALL) 10 MG tablet; Take 1 tablet (10 mg) by mouth daily  -     amphetamine-dextroamphetamine (ADDERALL) 10 MG tablet; Take 1 tablet (10 mg) by mouth daily  -     amphetamine-dextroamphetamine (ADDERALL XR) 30 MG 24 hr capsule; Take 1 capsule (30 mg) by mouth daily  -     amphetamine-dextroamphetamine (ADDERALL XR) 30 MG 24 hr capsule; Take 1 capsule (30 mg) by mouth daily    Seasonal allergic rhinitis, unspecified trigger  -     cetirizine (ZYRTEC) 10 MG tablet; Take 1 tablet (10 mg) by mouth daily  -     ketotifen (ZADITOR) 0.025 % ophthalmic solution; Place 1 drop into both eyes 2 times daily    Lipoma of skin and subcutaneous tissue:  Right upper back, 1 cm in size, discussed benign nature, continue to monitor    Return in about 6 months (around 4/15/2022) for Routine Visit, virtual OK.    Susan Haase, APRN [facility]    Pilo Mcclure is a 26 year old who presents for the following health issues     History of Present Illness       He eats 2-3 servings of fruits and vegetables daily.He consumes 1 sweetened beverage(s) daily.He exercises with enough effort to increase his heart rate 30 to 60 minutes per day.  He exercises  "with enough effort to increase his heart rate 5 days per week.   He is taking medications regularly.     Medication Followup of Adderall     Taking Medication as prescribed: yes    Side Effects:  None    Medication Helping Symptoms:  yes   Medication Benefits:   Controlled symptoms: Attention span, Distractability, Finishing   Uncontrolled symptoms:  None     Medication Side Effects:  Reports:  dry mouth  Sleep Problems? None  ++++++++++++++++++++++++++++++++++++++++++++++++  Coworker Concerns:   None  Home/Family Concerns: Stable   Takes 7 days per week.     Exercising 5 days per week, weight and cardio. Trying to eat healthy.      Patient said that he has a Bump on the right shoulder: present for 6 months, no pain, has full ROM of shoulder.    Review of Systems   CONSTITUTIONAL: NEGATIVE for fever, chills, change in weight  RESP: NEGATIVE for significant cough or SOB  CV: NEGATIVE for chest pain, palpitations or peripheral edema  MS:  See HPI  PSYCHIATRIC: NEGATIVE for changes in mood or affect      Objective    /80 (BP Location: Right arm, Patient Position: Chair, Cuff Size: Adult Large)   Pulse 87   Temp 97.9  F (36.6  C) (Oral)   Ht 1.803 m (5' 11\")   Wt 105.2 kg (232 lb)   SpO2 99%   BMI 32.36 kg/m    Body mass index is 32.36 kg/m .  Physical Exam   GENERAL: healthy, alert and no distress  MS:  Right trapezius muscle area with a 1 cm lipoma, no tenderness/erythema, mobile  RESP: lungs clear to auscultation - no rales, rhonchi or wheezes  CV: regular rate and rhythm, normal S1 S2, no S3 or S4, no murmur, click or rub, no peripheral edema and peripheral pulses strong  NEURO: Normal strength and tone, mentation intact and speech normal  PSYCH: mentation appears normal, affect normal/bright        "

## 2021-10-15 NOTE — LETTER
[facility]  10/15/21  Patient: Ren Lawton  YOB: 1995  Medical Record Number: [id number]                                                                                  Non-Opioid Controlled Substance Agreement    This is an agreement between you and your provider regarding safe and appropriate use of controlled substances prescribed by your care team. Controlled substances are?medicines that can cause physical and mental dependence (abuse).     There are strict laws about having and using these medicines. We here at [facility] are  committed to working with you in your efforts to get better. To support you in this work, we'll help you schedule regular office appointments for medicine refills. If we must cancel or change your appointment for any reason, we'll make sure you have enough medicine to last until your next appointment.     As a Provider, I will:     Listen carefully to your concerns while treating you with respect.     Recommend a treatment plan that I believe is in your best interest and may involve therapies other than medicine.      Talk with you often about the possible benefits and the risk of harm of any medicine that we prescribe for you.    Assess the safety of this medicine and check how well it works.      Provide a plan on how to taper (discontinue or go off) using this medicine if the decision is made to stop its use.      ::  As a Patient, I understand controlled substances:       Are prescribed by my care provider to help me function or work and manage my condition(s).?    Are strong medicines and can cause serious side effects.       Need to be taken exactly as prescribed.?Combining controlled substances with certain medicines or chemicals (such as illegal drugs, alcohol, sedatives, sleeping pills, and benzodiazepines) can be dangerous or even fatal.? If I stop taking my medicines suddenly, I may have severe withdrawal symptoms.     The  risks, benefits, and side effects of these medicine(s) were explained to me. I agree that:    1. I will take part in other treatments as advised by my care team. This may be psychiatry or counseling, physical therapy, behavioral therapy, group treatment or a referral to specialist.    2. I will keep all my appointments and understand this is part of the monitoring of controlled substances.?My care team may require an office visit for EVERY controlled substance refill. If I miss appointments or don t follow instructions, my care team may stop my medicine    3. I will take my medicines as prescribed. I will not change the dose or schedule unless my care team tells me to. There will be no refills if I run out early.      4. I may be asked to come to the clinic and complete a urine drug test or complete a pill count. If I don t give a urine sample or participate in a pill count, the care team may stop my medicine.    5. I will only receive controlled substance prescriptions from this clinic. If I am treated by another provider, I will tell them that I am taking controlled substances and that I have a treatment agreement with this provider. I will inform my [facility] care team within one business day if I am given a prescription for any controlled substance by another healthcare provider. My [facility] care team can contact other providers and pharmacists about my use of any medicines.    6. It is up to me to make sure that I don't run out of my medicines on weekends or holidays.?If my care team is willing to refill my prescription without a visit, I must request refills only during office hours. Refills may take up to 3 business days to process. I will use one pharmacy to fill all my controlled substance prescriptions. I will notify the clinic about any changes to my insurance or medicine availability.    7. I am responsible for my prescriptions. If the medicine/prescription is lost, stolen or destroyed,  it will not be replaced.?I also agree not to share controlled substance medicines with anyone.     8. I am aware I should not use any illegal or recreational drugs. I agree not to drink alcohol unless my care team says I can.     9. If I enroll in the [place] Medical Cannabis program, I will tell my care team before my next refill.    10. I will tell my care team right away if I become pregnant, have a new medical problem treated outside of my regular clinic, or have a change in my medicines.     11. I understand that this medicine can affect my thinking, judgment and reaction time.? Alcohol and drugs affect the brain and body, which can affect the safety of my driving. Being under the influence of alcohol or drugs can affect my decision-making, behaviors, personal safety and the safety of others. Driving while impaired (DWI) can occur if a person is driving, operating or in physical control of a car, motorcycle, boat, snowmobile, ATV, motorbike, off-road vehicle or any other motor vehicle ([place] Statute 169A.20). I understand the risk if I choose to drive or operate any vehicle or machinery.    I understand that if I do not follow any of the conditions above, my prescriptions or treatment may be stopped or changed.   I agree that my provider, clinic care team and pharmacy may work with any city, state or federal law enforcement agency that investigates the misuse, sale or other diversion of my controlled medicine. I will allow my provider to discuss my care with, or share a copy of, this agreement with any other treating provider, pharmacy or emergency room where I receive care.     I have read this agreement and have asked questions about anything I did not understand.    ________________________________________________________  Patient Signature - Ren Lawton     ___________________                   Date     ________________________________________________________  Provider Signature - Susan Haase, APRN CNP        ___________________                   Date     ________________________________________________________  Witness Signature (required if provider not present while patient signing)          ___________________                   Date

## 2021-12-13 ENCOUNTER — TELEPHONE (OUTPATIENT)
Dept: FAMILY MEDICINE | Facility: CLINIC | Age: 26
End: 2021-12-13
Payer: COMMERCIAL

## 2021-12-14 NOTE — TELEPHONE ENCOUNTER
Called pharmacy to notify.   Called patient to notify of below.     Anna Carreno, ALEXN, RN  [facility]

## 2021-12-20 ENCOUNTER — VIRTUAL VISIT (OUTPATIENT)
Dept: FAMILY MEDICINE | Facility: CLINIC | Age: 26
End: 2021-12-20
Payer: COMMERCIAL

## 2021-12-20 DIAGNOSIS — F90.2 ATTENTION DEFICIT HYPERACTIVITY DISORDER (ADHD), COMBINED TYPE: ICD-10-CM

## 2021-12-20 PROCEDURE — 99213 OFFICE O/P EST LOW 20 MIN: CPT | Mod: 95 | Performed by: NURSE PRACTITIONER

## 2021-12-20 RX ORDER — DEXTROAMPHETAMINE SACCHARATE, AMPHETAMINE ASPARTATE MONOHYDRATE, DEXTROAMPHETAMINE SULFATE AND AMPHETAMINE SULFATE 7.5; 7.5; 7.5; 7.5 MG/1; MG/1; MG/1; MG/1
30 CAPSULE, EXTENDED RELEASE ORAL DAILY
Qty: 30 CAPSULE | Refills: 0 | Status: SHIPPED | OUTPATIENT
Start: 2022-03-14 | End: 2022-03-23

## 2021-12-20 RX ORDER — DEXTROAMPHETAMINE SACCHARATE, AMPHETAMINE ASPARTATE, DEXTROAMPHETAMINE SULFATE AND AMPHETAMINE SULFATE 2.5; 2.5; 2.5; 2.5 MG/1; MG/1; MG/1; MG/1
10 TABLET ORAL DAILY
Qty: 30 TABLET | Refills: 0 | Status: SHIPPED | OUTPATIENT
Start: 2022-03-14 | End: 2022-03-23

## 2021-12-20 RX ORDER — DEXTROAMPHETAMINE SACCHARATE, AMPHETAMINE ASPARTATE MONOHYDRATE, DEXTROAMPHETAMINE SULFATE AND AMPHETAMINE SULFATE 7.5; 7.5; 7.5; 7.5 MG/1; MG/1; MG/1; MG/1
30 CAPSULE, EXTENDED RELEASE ORAL DAILY
Qty: 30 CAPSULE | Refills: 0 | Status: SHIPPED | OUTPATIENT
Start: 2022-01-13 | End: 2022-03-23

## 2021-12-20 RX ORDER — DEXTROAMPHETAMINE SACCHARATE, AMPHETAMINE ASPARTATE, DEXTROAMPHETAMINE SULFATE AND AMPHETAMINE SULFATE 2.5; 2.5; 2.5; 2.5 MG/1; MG/1; MG/1; MG/1
10 TABLET ORAL DAILY
Qty: 30 TABLET | Refills: 0 | Status: SHIPPED | OUTPATIENT
Start: 2022-02-12 | End: 2022-03-23

## 2021-12-20 RX ORDER — DEXTROAMPHETAMINE SACCHARATE, AMPHETAMINE ASPARTATE MONOHYDRATE, DEXTROAMPHETAMINE SULFATE AND AMPHETAMINE SULFATE 7.5; 7.5; 7.5; 7.5 MG/1; MG/1; MG/1; MG/1
30 CAPSULE, EXTENDED RELEASE ORAL DAILY
Qty: 30 CAPSULE | Refills: 0 | Status: CANCELLED | OUTPATIENT
Start: 2021-12-20

## 2021-12-20 RX ORDER — DEXTROAMPHETAMINE SACCHARATE, AMPHETAMINE ASPARTATE, DEXTROAMPHETAMINE SULFATE AND AMPHETAMINE SULFATE 2.5; 2.5; 2.5; 2.5 MG/1; MG/1; MG/1; MG/1
10 TABLET ORAL 2 TIMES DAILY
Qty: 30 TABLET | Refills: 0 | Status: CANCELLED | OUTPATIENT
Start: 2021-12-20

## 2021-12-20 RX ORDER — DEXTROAMPHETAMINE SACCHARATE, AMPHETAMINE ASPARTATE, DEXTROAMPHETAMINE SULFATE AND AMPHETAMINE SULFATE 2.5; 2.5; 2.5; 2.5 MG/1; MG/1; MG/1; MG/1
10 TABLET ORAL DAILY
Qty: 30 TABLET | Refills: 0 | Status: SHIPPED | OUTPATIENT
Start: 2022-01-13 | End: 2022-03-23

## 2021-12-20 RX ORDER — DEXTROAMPHETAMINE SACCHARATE, AMPHETAMINE ASPARTATE MONOHYDRATE, DEXTROAMPHETAMINE SULFATE AND AMPHETAMINE SULFATE 7.5; 7.5; 7.5; 7.5 MG/1; MG/1; MG/1; MG/1
30 CAPSULE, EXTENDED RELEASE ORAL DAILY
Qty: 30 CAPSULE | Refills: 0 | Status: SHIPPED | OUTPATIENT
Start: 2022-02-12 | End: 2022-03-23

## 2021-12-20 NOTE — PROGRESS NOTES
"Ren is a 26 year old who is being evaluated via a billable video visit.      How would you like to obtain your AVS? MyChart  If the video visit is dropped, the invitation should be resent by: Text to cell phone: [phone]  Will anyone else be joining your video visit? No     Video Start Time:1348  Assessment & Plan     Attention deficit hyperactivity disorder (ADHD), combined type:  Reviewed that controlled substance can be prescribed 2 days prior to the date, will change pharmacies so able to have more options regarding times to  meds.  - amphetamine-dextroamphetamine (ADDERALL XR) 30 MG 24 hr capsule  Dispense: 30 capsule; Refill: 0  - amphetamine-dextroamphetamine (ADDERALL XR) 30 MG 24 hr capsule  Dispense: 30 capsule; Refill: 0  - amphetamine-dextroamphetamine (ADDERALL XR) 30 MG 24 hr capsule  Dispense: 30 capsule; Refill: 0  - amphetamine-dextroamphetamine (ADDERALL) 10 MG tablet  Dispense: 30 tablet; Refill: 0  - amphetamine-dextroamphetamine (ADDERALL) 10 MG tablet  Dispense: 30 tablet; Refill: 0  - amphetamine-dextroamphetamine (ADDERALL) 10 MG tablet  Dispense: 30 tablet; Refill: 0    FUTURE APPOINTMENTS:       - Follow-up visit in 6 months    Susan Haase, APRN [facility] [place]   Ren is a 26 year old who presents for the following health issues  HPI     Medication Followup of Adderal    Taking Medication as prescribed: yes    Side Effects:  None    Medication Helping Symptoms:  Yes    Pt states he doesn't want to wait till the day of the pickup is prescription.     Review of Systems   CONSTITUTIONAL: NEGATIVE for fever, chills, change in weight  PSYCHIATRIC: NEGATIVE for changes in mood or affect      Objective    Vitals - Patient Reported  Weight (Patient Reported): 95.3 kg (210 lb)  Height (Patient Reported): 180.3 cm (5' 11\")  BMI (Based on Pt Reported Ht/Wt): 29.29      Vitals:  No vitals were obtained today due to virtual visit.    Physical " Exam   GENERAL: Healthy, alert and no distress  PSYCH: Mentation appears normal, affect normal/bright, judgement and insight intact, normal speech and appearance well-groomed.            Video-Visit Details    Type of service:  Video Visit    Video End Time:1404  Originating Location (pt. Location): Home    Distant Location (provider location):  [facility]     Platform used for Video Visit: Accelera

## 2022-01-15 ENCOUNTER — HEALTH MAINTENANCE LETTER (OUTPATIENT)
Age: 27
End: 2022-01-15

## 2022-02-17 PROBLEM — F90.2 ATTENTION DEFICIT HYPERACTIVITY DISORDER (ADHD), COMBINED TYPE: Status: ACTIVE | Noted: 2017-09-08

## 2022-03-23 ENCOUNTER — TELEPHONE (OUTPATIENT)
Dept: FAMILY MEDICINE | Facility: CLINIC | Age: 27
End: 2022-03-23
Payer: COMMERCIAL

## 2022-03-23 DIAGNOSIS — F90.2 ATTENTION DEFICIT HYPERACTIVITY DISORDER (ADHD), COMBINED TYPE: ICD-10-CM

## 2022-03-23 RX ORDER — DEXTROAMPHETAMINE SACCHARATE, AMPHETAMINE ASPARTATE, DEXTROAMPHETAMINE SULFATE AND AMPHETAMINE SULFATE 2.5; 2.5; 2.5; 2.5 MG/1; MG/1; MG/1; MG/1
10 TABLET ORAL DAILY
Qty: 30 TABLET | Refills: 0 | Status: SHIPPED | OUTPATIENT
Start: 2022-06-12 | End: 2022-07-14

## 2022-03-23 RX ORDER — DEXTROAMPHETAMINE SACCHARATE, AMPHETAMINE ASPARTATE MONOHYDRATE, DEXTROAMPHETAMINE SULFATE AND AMPHETAMINE SULFATE 7.5; 7.5; 7.5; 7.5 MG/1; MG/1; MG/1; MG/1
30 CAPSULE, EXTENDED RELEASE ORAL DAILY
Qty: 30 CAPSULE | Refills: 0 | Status: SHIPPED | OUTPATIENT
Start: 2022-04-13 | End: 2022-12-30

## 2022-03-23 RX ORDER — DEXTROAMPHETAMINE SACCHARATE, AMPHETAMINE ASPARTATE MONOHYDRATE, DEXTROAMPHETAMINE SULFATE AND AMPHETAMINE SULFATE 7.5; 7.5; 7.5; 7.5 MG/1; MG/1; MG/1; MG/1
30 CAPSULE, EXTENDED RELEASE ORAL DAILY
Qty: 30 CAPSULE | Refills: 0 | Status: SHIPPED | OUTPATIENT
Start: 2022-06-12 | End: 2022-07-14

## 2022-03-23 RX ORDER — DEXTROAMPHETAMINE SACCHARATE, AMPHETAMINE ASPARTATE MONOHYDRATE, DEXTROAMPHETAMINE SULFATE AND AMPHETAMINE SULFATE 7.5; 7.5; 7.5; 7.5 MG/1; MG/1; MG/1; MG/1
30 CAPSULE, EXTENDED RELEASE ORAL DAILY
Qty: 30 CAPSULE | Refills: 0 | Status: SHIPPED | OUTPATIENT
Start: 2022-05-13 | End: 2022-12-30

## 2022-03-23 RX ORDER — DEXTROAMPHETAMINE SACCHARATE, AMPHETAMINE ASPARTATE, DEXTROAMPHETAMINE SULFATE AND AMPHETAMINE SULFATE 2.5; 2.5; 2.5; 2.5 MG/1; MG/1; MG/1; MG/1
10 TABLET ORAL DAILY
Qty: 30 TABLET | Refills: 0 | Status: SHIPPED | OUTPATIENT
Start: 2022-04-13 | End: 2022-12-30

## 2022-03-23 RX ORDER — DEXTROAMPHETAMINE SACCHARATE, AMPHETAMINE ASPARTATE, DEXTROAMPHETAMINE SULFATE AND AMPHETAMINE SULFATE 2.5; 2.5; 2.5; 2.5 MG/1; MG/1; MG/1; MG/1
10 TABLET ORAL DAILY
Qty: 30 TABLET | Refills: 0 | Status: SHIPPED | OUTPATIENT
Start: 2022-05-13 | End: 2022-12-30

## 2022-03-23 RX ORDER — DEXTROAMPHETAMINE SACCHARATE, AMPHETAMINE ASPARTATE, DEXTROAMPHETAMINE SULFATE AND AMPHETAMINE SULFATE 2.5; 2.5; 2.5; 2.5 MG/1; MG/1; MG/1; MG/1
10 TABLET ORAL DAILY
Qty: 30 TABLET | Refills: 0 | Status: SHIPPED | OUTPATIENT
Start: 2022-05-13 | End: 2022-03-23

## 2022-03-23 NOTE — TELEPHONE ENCOUNTER
Pt calls,   ~wants next 3 months sent now, agrees to make visit in June, route to , pt takes 2 doses, pt wants call back when sent, may LMOVM    FUTURE APPOINTMENTS:       - Follow-up visit in 6 months    Routing refill request to provider for review/approval because:  Drug not on the [facility] refill protocol        [facility]

## 2022-07-13 DIAGNOSIS — F90.2 ATTENTION DEFICIT HYPERACTIVITY DISORDER (ADHD), COMBINED TYPE: ICD-10-CM

## 2022-07-13 NOTE — TELEPHONE ENCOUNTER
Routing refill request to provider for review/approval because:  Drug not on the [facility] refill protocol   ** last visit 12/20/2021     Patient transferred to RN PAL voicemail incorrectly,  Non SB #3 patient     Ariella Allison, Registered Nurse  [facility]

## 2022-07-14 RX ORDER — DEXTROAMPHETAMINE SACCHARATE, AMPHETAMINE ASPARTATE MONOHYDRATE, DEXTROAMPHETAMINE SULFATE AND AMPHETAMINE SULFATE 7.5; 7.5; 7.5; 7.5 MG/1; MG/1; MG/1; MG/1
30 CAPSULE, EXTENDED RELEASE ORAL DAILY
Qty: 30 CAPSULE | Refills: 0 | Status: SHIPPED | OUTPATIENT
Start: 2022-07-14 | End: 2022-12-30

## 2022-07-14 RX ORDER — DEXTROAMPHETAMINE SACCHARATE, AMPHETAMINE ASPARTATE, DEXTROAMPHETAMINE SULFATE AND AMPHETAMINE SULFATE 2.5; 2.5; 2.5; 2.5 MG/1; MG/1; MG/1; MG/1
10 TABLET ORAL DAILY
Qty: 30 TABLET | Refills: 0 | Status: SHIPPED | OUTPATIENT
Start: 2022-07-14 | End: 2022-12-30

## 2022-07-14 NOTE — TELEPHONE ENCOUNTER
RN talked to patient     Reviewed message below     Scheduled with Kaylene Geller CNP to establish care     Ariella Allison Registered Nurse  [facility]

## 2022-08-16 ENCOUNTER — ALLIED HEALTH/NURSE VISIT (OUTPATIENT)
Dept: FAMILY MEDICINE | Facility: CLINIC | Age: 27
End: 2022-08-16
Payer: COMMERCIAL

## 2022-08-16 VITALS
DIASTOLIC BLOOD PRESSURE: 72 MMHG | HEART RATE: 65 BPM | OXYGEN SATURATION: 99 % | SYSTOLIC BLOOD PRESSURE: 146 MMHG | TEMPERATURE: 97.9 F

## 2022-08-16 DIAGNOSIS — F90.2 ATTENTION DEFICIT HYPERACTIVITY DISORDER (ADHD), COMBINED TYPE: ICD-10-CM

## 2022-08-16 DIAGNOSIS — F90.2 ATTENTION DEFICIT HYPERACTIVITY DISORDER (ADHD), COMBINED TYPE: Primary | ICD-10-CM

## 2022-08-16 PROCEDURE — 99207 PR NO CHARGE NURSE ONLY: CPT

## 2022-08-16 RX ORDER — DEXTROAMPHETAMINE SACCHARATE, AMPHETAMINE ASPARTATE MONOHYDRATE, DEXTROAMPHETAMINE SULFATE AND AMPHETAMINE SULFATE 7.5; 7.5; 7.5; 7.5 MG/1; MG/1; MG/1; MG/1
30 CAPSULE, EXTENDED RELEASE ORAL DAILY
Qty: 30 CAPSULE | Refills: 0 | Status: CANCELLED | OUTPATIENT
Start: 2022-08-16

## 2022-08-16 NOTE — PROGRESS NOTES
Patient missed appointment with ANIRUDH Elder CNP     BP Readings from Last 1 Encounters:   08/16/22 (!) 146/72     Reports he drank coffee at 0740  Rescheduled appointment for 9/2    Huddle with Primary Care Provider. Patient ok to leave clinic. Adderall prescription denied until patient is seen.    Patient was given an opportunity to ask questions, verbalized understanding of plan, and is agreeable.     Tiara Dubois RN

## 2022-08-30 NOTE — TELEPHONE ENCOUNTER
Patient was to get refill at appointment 8/16/22. He did not show for this appointment.     Rescheduled for 9/2/22. May request refill at the visit.    Tiara Dubois RN

## 2022-09-02 ENCOUNTER — OFFICE VISIT (OUTPATIENT)
Dept: FAMILY MEDICINE | Facility: CLINIC | Age: 27
End: 2022-09-02
Payer: COMMERCIAL

## 2022-09-02 VITALS
WEIGHT: 221 LBS | HEART RATE: 70 BPM | OXYGEN SATURATION: 100 % | BODY MASS INDEX: 30.82 KG/M2 | RESPIRATION RATE: 14 BRPM | TEMPERATURE: 98.4 F | SYSTOLIC BLOOD PRESSURE: 136 MMHG | DIASTOLIC BLOOD PRESSURE: 80 MMHG

## 2022-09-02 DIAGNOSIS — F90.2 ATTENTION DEFICIT HYPERACTIVITY DISORDER (ADHD), COMBINED TYPE: Primary | ICD-10-CM

## 2022-09-02 PROCEDURE — 90471 IMMUNIZATION ADMIN: CPT | Performed by: NURSE PRACTITIONER

## 2022-09-02 PROCEDURE — 99213 OFFICE O/P EST LOW 20 MIN: CPT | Mod: 25 | Performed by: NURSE PRACTITIONER

## 2022-09-02 PROCEDURE — 90677 PCV20 VACCINE IM: CPT | Performed by: NURSE PRACTITIONER

## 2022-09-02 RX ORDER — DEXTROAMPHETAMINE SACCHARATE, AMPHETAMINE ASPARTATE, DEXTROAMPHETAMINE SULFATE AND AMPHETAMINE SULFATE 2.5; 2.5; 2.5; 2.5 MG/1; MG/1; MG/1; MG/1
10 TABLET ORAL DAILY
Qty: 30 TABLET | Refills: 0 | Status: SHIPPED | OUTPATIENT
Start: 2022-09-02 | End: 2022-10-02

## 2022-09-02 RX ORDER — DEXTROAMPHETAMINE SACCHARATE, AMPHETAMINE ASPARTATE MONOHYDRATE, DEXTROAMPHETAMINE SULFATE AND AMPHETAMINE SULFATE 7.5; 7.5; 7.5; 7.5 MG/1; MG/1; MG/1; MG/1
30 CAPSULE, EXTENDED RELEASE ORAL DAILY
Qty: 30 CAPSULE | Refills: 0 | Status: SHIPPED | OUTPATIENT
Start: 2022-10-03 | End: 2022-11-02

## 2022-09-02 RX ORDER — DEXTROAMPHETAMINE SACCHARATE, AMPHETAMINE ASPARTATE MONOHYDRATE, DEXTROAMPHETAMINE SULFATE AND AMPHETAMINE SULFATE 7.5; 7.5; 7.5; 7.5 MG/1; MG/1; MG/1; MG/1
30 CAPSULE, EXTENDED RELEASE ORAL DAILY
Qty: 30 CAPSULE | Refills: 0 | Status: SHIPPED | OUTPATIENT
Start: 2022-09-02 | End: 2022-10-02

## 2022-09-02 RX ORDER — DEXTROAMPHETAMINE SACCHARATE, AMPHETAMINE ASPARTATE, DEXTROAMPHETAMINE SULFATE AND AMPHETAMINE SULFATE 2.5; 2.5; 2.5; 2.5 MG/1; MG/1; MG/1; MG/1
10 TABLET ORAL DAILY
Qty: 30 TABLET | Refills: 0 | Status: SHIPPED | OUTPATIENT
Start: 2022-10-03 | End: 2022-11-02

## 2022-09-02 RX ORDER — DEXTROAMPHETAMINE SACCHARATE, AMPHETAMINE ASPARTATE MONOHYDRATE, DEXTROAMPHETAMINE SULFATE AND AMPHETAMINE SULFATE 7.5; 7.5; 7.5; 7.5 MG/1; MG/1; MG/1; MG/1
30 CAPSULE, EXTENDED RELEASE ORAL DAILY
Qty: 30 CAPSULE | Refills: 0 | Status: SHIPPED | OUTPATIENT
Start: 2022-11-03 | End: 2022-12-03

## 2022-09-02 RX ORDER — DEXTROAMPHETAMINE SACCHARATE, AMPHETAMINE ASPARTATE, DEXTROAMPHETAMINE SULFATE AND AMPHETAMINE SULFATE 2.5; 2.5; 2.5; 2.5 MG/1; MG/1; MG/1; MG/1
10 TABLET ORAL DAILY
Qty: 30 TABLET | Refills: 0 | Status: SHIPPED | OUTPATIENT
Start: 2022-11-03 | End: 2022-12-03

## 2022-09-02 NOTE — PROGRESS NOTES
Assessment & Plan     Attention deficit hyperactivity disorder (ADHD), combined type  Stable. Does have some mild dips when medication is wearing off. Has used Vyvanse in past with good benefits however not covered by insurance. Will check as may have new insurance at this time.   Will update me if wanting to trial Vyvanse.   - amphetamine-dextroamphetamine (ADDERALL XR) 30 MG 24 hr capsule  Dispense: 30 capsule; Refill: 0  - amphetamine-dextroamphetamine (ADDERALL XR) 30 MG 24 hr capsule  Dispense: 30 capsule; Refill: 0  - amphetamine-dextroamphetamine (ADDERALL XR) 30 MG 24 hr capsule  Dispense: 30 capsule; Refill: 0  - amphetamine-dextroamphetamine (ADDERALL) 10 MG tablet  Dispense: 30 tablet; Refill: 0  - amphetamine-dextroamphetamine (ADDERALL) 10 MG tablet  Dispense: 30 tablet; Refill: 0  - amphetamine-dextroamphetamine (ADDERALL) 10 MG tablet  Dispense: 30 tablet; Refill: 0               Nicotine/Tobacco Cessation:  He reports that he has been smoking cigarettes. He has a 0.30 pack-year smoking history. He has never used smokeless tobacco.  Nicotine/Tobacco Cessation Plan:   Information offered: Patient not interested at this time          No follow-ups on file.    ANIRUDH Elder CNP  [facility] CANDIDA Mcclure is a 26 year old, presenting for the following health issues:  Recheck Medication and Establish Care      History of Present Illness       Reason for visit:  Med check    He eats 2-3 servings of fruits and vegetables daily.He consumes 1 sweetened beverage(s) daily.He exercises with enough effort to increase his heart rate 30 to 60 minutes per day.  He exercises with enough effort to increase his heart rate 5 days per week.   He is taking medications regularly.     ADHD Follow-Up (Adult)  Concerns: has not been on medication for last month, wondering about alternative medication  Changes since last visit: Stable  Taking controlled (daily) medications as  prescribed: Yes  Sleep: no problems  Adult ADHD Self-Reporting form given to patient?:  No  Currently in counseling:     Medication Benefits:   Controlled symptoms: Attention span and Distractability  Uncontrolled symptoms:  None    Medication Side Effects:  Reports:  none    ++++++++++++++++++++++++++++++++++++++++++++++++          Review of Systems   Constitutional, HEENT, cardiovascular, pulmonary, gi and gu systems are negative, except as otherwise noted.      Objective    /80 (BP Location: Right arm, Patient Position: Chair, Cuff Size: Adult Regular)   Pulse 70   Temp 98.4  F (36.9  C) (Oral)   Resp 14   Wt 100.2 kg (221 lb)   SpO2 100%   BMI 30.82 kg/m    Body mass index is 30.82 kg/m .  Physical Exam   GENERAL: healthy, alert and no distress  NECK: no adenopathy, no asymmetry, masses, or scars and thyroid normal to palpation  RESP: lungs clear to auscultation - no rales, rhonchi or wheezes  CV: regular rate and rhythm, normal S1 S2, no S3 or S4, no murmur, click or rub, no peripheral edema and peripheral pulses strong  ABDOMEN: soft, nontender, no hepatosplenomegaly, no masses and bowel sounds normal  MS: no gross musculoskeletal defects noted, no edema                    [unfilled]  [unfilled]

## 2022-11-30 DIAGNOSIS — F90.2 ATTENTION DEFICIT HYPERACTIVITY DISORDER (ADHD), COMBINED TYPE: ICD-10-CM

## 2022-11-30 RX ORDER — DEXTROAMPHETAMINE SACCHARATE, AMPHETAMINE ASPARTATE, DEXTROAMPHETAMINE SULFATE AND AMPHETAMINE SULFATE 2.5; 2.5; 2.5; 2.5 MG/1; MG/1; MG/1; MG/1
10 TABLET ORAL DAILY
Qty: 30 TABLET | Refills: 0 | Status: CANCELLED | OUTPATIENT
Start: 2022-11-30

## 2022-11-30 RX ORDER — DEXTROAMPHETAMINE SACCHARATE, AMPHETAMINE ASPARTATE MONOHYDRATE, DEXTROAMPHETAMINE SULFATE AND AMPHETAMINE SULFATE 7.5; 7.5; 7.5; 7.5 MG/1; MG/1; MG/1; MG/1
30 CAPSULE, EXTENDED RELEASE ORAL DAILY
Qty: 30 CAPSULE | Refills: 0 | Status: CANCELLED | OUTPATIENT
Start: 2022-11-30

## 2022-11-30 NOTE — TELEPHONE ENCOUNTER
Routing refill request to provider for review/approval because:  Drug not on the [facility] refill protocol   Last visit 9/2/2022 F2F     Ariella Allison, Registered Nurse  [facility]

## 2022-11-30 NOTE — TELEPHONE ENCOUNTER
Reason for Call:  Medication or medication refill:    Do you use a [facility]?  Name of the pharmacy and phone number for the current request:  [facility] [id number] [address] [facility] [phone]    Name of the medication requested: amphetamine-dextroamphetamine (ADDERALL XR) 30 MG 24 hr capsule and amphetamine-dextroamphetamine (ADDERALL) 10 MG tablet    Other request: Patient needs a refill on medications     Can we leave a detailed message on this number? YES    Phone number patient can be reached at: Cell number on file:    Telephone Information:   Mobile [phone]       Best Time: anytime after 9am     Call taken on 11/30/2022 at 2:51 PM by Dago Muniz

## 2022-12-01 RX ORDER — DEXTROAMPHETAMINE SACCHARATE, AMPHETAMINE ASPARTATE, DEXTROAMPHETAMINE SULFATE AND AMPHETAMINE SULFATE 2.5; 2.5; 2.5; 2.5 MG/1; MG/1; MG/1; MG/1
10 TABLET ORAL DAILY
Qty: 30 TABLET | Refills: 0 | Status: SHIPPED | OUTPATIENT
Start: 2022-12-01 | End: 2022-12-30

## 2022-12-01 RX ORDER — DEXTROAMPHETAMINE SACCHARATE, AMPHETAMINE ASPARTATE MONOHYDRATE, DEXTROAMPHETAMINE SULFATE AND AMPHETAMINE SULFATE 7.5; 7.5; 7.5; 7.5 MG/1; MG/1; MG/1; MG/1
30 CAPSULE, EXTENDED RELEASE ORAL DAILY
Qty: 30 CAPSULE | Refills: 0 | Status: SHIPPED | OUTPATIENT
Start: 2023-02-01 | End: 2022-12-30

## 2022-12-01 RX ORDER — DEXTROAMPHETAMINE SACCHARATE, AMPHETAMINE ASPARTATE, DEXTROAMPHETAMINE SULFATE AND AMPHETAMINE SULFATE 2.5; 2.5; 2.5; 2.5 MG/1; MG/1; MG/1; MG/1
10 TABLET ORAL DAILY
Qty: 30 TABLET | Refills: 0 | Status: SHIPPED | OUTPATIENT
Start: 2023-02-01 | End: 2022-12-30

## 2022-12-01 RX ORDER — DEXTROAMPHETAMINE SACCHARATE, AMPHETAMINE ASPARTATE, DEXTROAMPHETAMINE SULFATE AND AMPHETAMINE SULFATE 2.5; 2.5; 2.5; 2.5 MG/1; MG/1; MG/1; MG/1
10 TABLET ORAL DAILY
Qty: 30 TABLET | Refills: 0 | Status: SHIPPED | OUTPATIENT
Start: 2023-01-01 | End: 2022-12-30

## 2022-12-01 RX ORDER — DEXTROAMPHETAMINE SACCHARATE, AMPHETAMINE ASPARTATE MONOHYDRATE, DEXTROAMPHETAMINE SULFATE AND AMPHETAMINE SULFATE 7.5; 7.5; 7.5; 7.5 MG/1; MG/1; MG/1; MG/1
30 CAPSULE, EXTENDED RELEASE ORAL DAILY
Qty: 30 CAPSULE | Refills: 0 | Status: SHIPPED | OUTPATIENT
Start: 2022-12-01 | End: 2022-12-30

## 2022-12-01 RX ORDER — DEXTROAMPHETAMINE SACCHARATE, AMPHETAMINE ASPARTATE MONOHYDRATE, DEXTROAMPHETAMINE SULFATE AND AMPHETAMINE SULFATE 7.5; 7.5; 7.5; 7.5 MG/1; MG/1; MG/1; MG/1
30 CAPSULE, EXTENDED RELEASE ORAL DAILY
Qty: 30 CAPSULE | Refills: 0 | Status: SHIPPED | OUTPATIENT
Start: 2023-01-01 | End: 2022-12-30

## 2022-12-01 NOTE — TELEPHONE ENCOUNTER
Refilled. Visit UTD,    record reviewed and is without red flags for controlled substance activity.    ANIRUDH Elder CNP on 12/1/2022 at 12:56 PM

## 2022-12-30 ENCOUNTER — TELEPHONE (OUTPATIENT)
Dept: FAMILY MEDICINE | Facility: CLINIC | Age: 27
End: 2022-12-30

## 2022-12-30 DIAGNOSIS — F90.2 ATTENTION DEFICIT HYPERACTIVITY DISORDER (ADHD), COMBINED TYPE: ICD-10-CM

## 2022-12-30 RX ORDER — DEXTROAMPHETAMINE SACCHARATE, AMPHETAMINE ASPARTATE MONOHYDRATE, DEXTROAMPHETAMINE SULFATE AND AMPHETAMINE SULFATE 7.5; 7.5; 7.5; 7.5 MG/1; MG/1; MG/1; MG/1
30 CAPSULE, EXTENDED RELEASE ORAL DAILY
Qty: 30 CAPSULE | Refills: 0 | Status: SHIPPED | OUTPATIENT
Start: 2022-12-30 | End: 2023-01-27

## 2022-12-30 RX ORDER — DEXTROAMPHETAMINE SACCHARATE, AMPHETAMINE ASPARTATE, DEXTROAMPHETAMINE SULFATE AND AMPHETAMINE SULFATE 2.5; 2.5; 2.5; 2.5 MG/1; MG/1; MG/1; MG/1
10 TABLET ORAL DAILY
Qty: 30 TABLET | Refills: 0 | Status: SHIPPED | OUTPATIENT
Start: 2023-01-01 | End: 2023-01-27

## 2022-12-30 RX ORDER — DEXTROAMPHETAMINE SACCHARATE, AMPHETAMINE ASPARTATE, DEXTROAMPHETAMINE SULFATE AND AMPHETAMINE SULFATE 2.5; 2.5; 2.5; 2.5 MG/1; MG/1; MG/1; MG/1
10 TABLET ORAL DAILY
Qty: 30 TABLET | Refills: 0 | Status: SHIPPED | OUTPATIENT
Start: 2023-02-01 | End: 2022-12-30

## 2022-12-30 RX ORDER — DEXTROAMPHETAMINE SACCHARATE, AMPHETAMINE ASPARTATE, DEXTROAMPHETAMINE SULFATE AND AMPHETAMINE SULFATE 2.5; 2.5; 2.5; 2.5 MG/1; MG/1; MG/1; MG/1
10 TABLET ORAL DAILY
Qty: 30 TABLET | Refills: 0 | Status: CANCELLED | OUTPATIENT
Start: 2023-01-01

## 2022-12-30 NOTE — TELEPHONE ENCOUNTER
Pt calling, picked up 30mg XR at [facility] pharmacy. Pt informs adderall sent today for immediate release was sent with wrong start date (see rx below)    amphetamine-dextroamphetamine (ADDERALL) 10 MG tablet 30 tablet 0 2/1/2023  No   Sig - Route: Take 1 tablet (10 mg) by mouth daily - Oral     Pt is waiting at pharmacy in his car currently as pharmacy is far from his house.    Pt requests call back when rx has been resent with start date of 1/1/23    Routing to PCP to review and advise.  Karma Garcia, RN

## 2022-12-30 NOTE — TELEPHONE ENCOUNTER
Medication Question or Refill        What medication are you calling about (include dose and sig)?: Adderall 30mg XR    Controlled Substance Agreement on file:   CSA -- Patient Level:     [Media Unavailable] Controlled Substance Agreement - Non - Opioid - Scan on 11/5/2021  9:00 AM: NON-OPIOID CONTROLLED SUBSTANCE AGREEMENT       Who prescribed the medication?: Kaylene Banuelospal    Do you need a refill? Yes: 2.1.23    When did you use the medication last? 1/2023    Patient offered an appointment? No    Do you have any questions or concerns?  Yes: [place] [facility] he only has his 10mg Adderall set for 2/1/23 would like 30mg XR set for 2/1/23 also,    Preferred Pharmacy:   [facility] [address] [place] [address] [facility]  [address] [facility] [address]  Phone: [phone] Fax: [phone]    [address] [address]  Phone: [phone] Fax: [phone] [facility] Fax: [phone], [phone]      Could we send this information to you in [facility] [place] or would you prefer to receive a phone call?:   Patient would like to be contacted via [facility] [place]

## 2022-12-30 NOTE — TELEPHONE ENCOUNTER
[facility] calling about Adderall 10 mg.  Due for refill but script on file.  RX was sent to [facility] and they do not have med available.  Please resend to [facility].  Malathi Lowe RN

## 2022-12-30 NOTE — TELEPHONE ENCOUNTER
General Call      Reason for Call:  [facility] does not have Adderall  In stock 10mg or 30mg    What are your questions or concerns:  I need my Adderall RX's sent to a different Pharmacy.   [facility]acy please.  They have both the 10mg and 30mg Adderall in stock    Date of last appointment with provider: 9/2/22Anastasiia Geller    Could we send this information to you in [facility] or would you prefer to receive a phone call?:   Patient would prefer a phone call   Okay to leave a detailed message?: Yes at Cell number on file:    Telephone Information:   Mobile [phone]

## 2022-12-30 NOTE — TELEPHONE ENCOUNTER
Please call to cancel current prescriptions.  Refilled at requested pharmacy.   ANIRUDH Elder CNP on 12/30/2022 at 1:54 PM

## 2023-01-07 ENCOUNTER — HEALTH MAINTENANCE LETTER (OUTPATIENT)
Age: 28
End: 2023-01-07

## 2023-01-18 ENCOUNTER — TELEPHONE (OUTPATIENT)
Dept: FAMILY MEDICINE | Facility: CLINIC | Age: 28
End: 2023-01-18
Payer: COMMERCIAL

## 2023-01-18 NOTE — TELEPHONE ENCOUNTER
General Call      Reason for Call:  Adderall Medications    What are your questions or concerns:  Ren noticed on  [facility] his next refill for 2/23 for both his Adderall's was not on file for refill.    Date of last appointment with provider: 9/22/22 Kaylene Geller    Could we send this information to you in [employer] or would you prefer to receive a phone call?:   Patient would prefer a phone call   Okay to leave a detailed message?: Yes at Cell number on file:    Telephone Information:   Mobile [phone]

## 2023-01-18 NOTE — TELEPHONE ENCOUNTER
Sent MyChart for clarification of request, pt is not due for refill, refills sent on 12/30 and 1/1.

## 2023-01-23 NOTE — PATIENT INSTRUCTIONS
Patient Instructions  Pre-Operative:  -Surgery scheduled at [facility] for left L5-S1 Microdiscectomy  -Surgical risks: blood clots in the leg or lung, problems urinating, nerve damage, drainage from the incision, infection,stiffness  - Pre-operative physical with primary care physician within 30 days of surgical date.   - Likely same day procedure with discharge home day of surgery, may stay for 23 hour observation hospitalization for monitoring.     -Shower procedure: please shower with antimicrobial soap the night before surgery and morning of surgery. Please refer to showering instruction sheet in folder.  -Stop all solid foods 8 hours before surgery.  -Keep drinking clear liquids until 4 hours before surgery. Clear liquids include water, clear juice, black coffee, or clear tea without milk, Gatorade, clear soda.   - Discontinue Aspirin, NSAIDs (Advil/Ibuprofen, Naproxen,Nuprin,Relafen/Nabumetone, Diclofenac,Meloxicam, Aleve, Celebrex) x 7 days prior to surgical date.   - May try tylenol for pain 1000 mg three times per day for pain      Post-Operative:  -you may resume taking NSAIDs 7 days post op   - Post operative incisional pain x 1-2 weeks which will require pain medications and muscle relaxants. You will receive medication upon discharge.  -Do NOT drive while taking narcotic pain medication.  -Do not drink alcohol while using any pain medication  -Post operative incision care- Watch for signs of infection: redness, swelling, warmth, drainage, and fever of 101 degrees or higher. Notify clinic [phone].  -No submerging incision in water such as pools, hot tubs,baths for at least 8 weeks or until incision is healed. Showers are fine.   - Post operative activity limitations: 6-8 weeks, no lifting > 10 pounds, no bending, twisting, or overhead reaching.  - Post op follow up appointments:6 week post op follow up visit with Nurse practitioner.Please call to schedule follow up appointment at  [phone].  -If you are currently employed, you will need to be off work for 2-4 weeks for post op recovery and healing           35

## 2023-01-27 ENCOUNTER — ALLIED HEALTH/NURSE VISIT (OUTPATIENT)
Dept: FAMILY MEDICINE | Facility: CLINIC | Age: 28
End: 2023-01-27
Payer: COMMERCIAL

## 2023-01-27 DIAGNOSIS — F90.2 ATTENTION DEFICIT HYPERACTIVITY DISORDER (ADHD), COMBINED TYPE: ICD-10-CM

## 2023-01-27 DIAGNOSIS — F90.2 ATTENTION DEFICIT HYPERACTIVITY DISORDER (ADHD), COMBINED TYPE: Primary | ICD-10-CM

## 2023-01-27 PROCEDURE — 99207 PR NO CHARGE NURSE ONLY: CPT

## 2023-01-27 RX ORDER — DEXTROAMPHETAMINE SACCHARATE, AMPHETAMINE ASPARTATE, DEXTROAMPHETAMINE SULFATE AND AMPHETAMINE SULFATE 2.5; 2.5; 2.5; 2.5 MG/1; MG/1; MG/1; MG/1
10 TABLET ORAL DAILY
Qty: 30 TABLET | Refills: 0 | Status: SHIPPED | OUTPATIENT
Start: 2023-01-29 | End: 2023-01-27

## 2023-01-27 RX ORDER — DEXTROAMPHETAMINE SACCHARATE, AMPHETAMINE ASPARTATE MONOHYDRATE, DEXTROAMPHETAMINE SULFATE AND AMPHETAMINE SULFATE 7.5; 7.5; 7.5; 7.5 MG/1; MG/1; MG/1; MG/1
30 CAPSULE, EXTENDED RELEASE ORAL DAILY
Qty: 30 CAPSULE | Refills: 0 | Status: SHIPPED | OUTPATIENT
Start: 2023-01-29 | End: 2023-02-28

## 2023-01-27 RX ORDER — DEXTROAMPHETAMINE SACCHARATE, AMPHETAMINE ASPARTATE, DEXTROAMPHETAMINE SULFATE AND AMPHETAMINE SULFATE 2.5; 2.5; 2.5; 2.5 MG/1; MG/1; MG/1; MG/1
10 TABLET ORAL DAILY
Qty: 30 TABLET | Refills: 0 | Status: SHIPPED | OUTPATIENT
Start: 2023-01-29 | End: 2023-02-28

## 2023-01-27 RX ORDER — DEXTROAMPHETAMINE SACCHARATE, AMPHETAMINE ASPARTATE MONOHYDRATE, DEXTROAMPHETAMINE SULFATE AND AMPHETAMINE SULFATE 7.5; 7.5; 7.5; 7.5 MG/1; MG/1; MG/1; MG/1
30 CAPSULE, EXTENDED RELEASE ORAL DAILY
Qty: 30 CAPSULE | Refills: 0 | Status: SHIPPED | OUTPATIENT
Start: 2023-01-29 | End: 2023-01-27

## 2023-01-27 NOTE — PROGRESS NOTES
Pt walks in, has had ongoing issue getting Adderall scripts. Pt requests rx for 10mg tablets be okayed for early fill date for today (rx sent has fill date of 1/29/23, see below)    amphetamine-dextroamphetamine (ADDERALL) 10 MG tablet 30 tablet 0 1/29/2023  No   Sig - Route: Take 1 tablet (10 mg) by mouth daily - Oral     Pt also requests rx for 30mg XR be resent to [facility] as no 30mg XR currently in stock at [facility]. Pt is waiting in clinic lobby at present. Pt requests to be notified if early fill is approved or not.    Telephone encounter created and routed to PCP with pt requests.    Karma Garcia RN

## 2023-01-27 NOTE — TELEPHONE ENCOUNTER
Cancelled [place] prescription and sent to [facility] pharmacy due to stock issue.  ANIRUDH Elder CNP on 1/27/2023 at 1:27 PM

## 2023-02-28 ENCOUNTER — MYC REFILL (OUTPATIENT)
Dept: FAMILY MEDICINE | Facility: CLINIC | Age: 28
End: 2023-02-28
Payer: COMMERCIAL

## 2023-02-28 DIAGNOSIS — F90.2 ATTENTION DEFICIT HYPERACTIVITY DISORDER (ADHD), COMBINED TYPE: ICD-10-CM

## 2023-03-01 RX ORDER — DEXTROAMPHETAMINE SACCHARATE, AMPHETAMINE ASPARTATE MONOHYDRATE, DEXTROAMPHETAMINE SULFATE AND AMPHETAMINE SULFATE 7.5; 7.5; 7.5; 7.5 MG/1; MG/1; MG/1; MG/1
30 CAPSULE, EXTENDED RELEASE ORAL DAILY
Qty: 30 CAPSULE | Refills: 0 | Status: SHIPPED | OUTPATIENT
Start: 2023-03-01 | End: 2023-03-28

## 2023-03-01 RX ORDER — DEXTROAMPHETAMINE SACCHARATE, AMPHETAMINE ASPARTATE, DEXTROAMPHETAMINE SULFATE AND AMPHETAMINE SULFATE 2.5; 2.5; 2.5; 2.5 MG/1; MG/1; MG/1; MG/1
10 TABLET ORAL DAILY
Qty: 30 TABLET | Refills: 0 | Status: SHIPPED | OUTPATIENT
Start: 2023-03-01 | End: 2023-03-28

## 2023-03-01 NOTE — TELEPHONE ENCOUNTER
Refilled 1 month. No further refills until see for follow-up. OK for video follow-up.   Thank you  ANIRUDH Elder CNP on 3/1/2023 at 9:46 AM

## 2023-03-17 ENCOUNTER — OFFICE VISIT (OUTPATIENT)
Dept: FAMILY MEDICINE | Facility: CLINIC | Age: 28
End: 2023-03-17
Payer: COMMERCIAL

## 2023-03-17 VITALS
HEIGHT: 71 IN | RESPIRATION RATE: 13 BRPM | TEMPERATURE: 98.1 F | WEIGHT: 223 LBS | SYSTOLIC BLOOD PRESSURE: 128 MMHG | DIASTOLIC BLOOD PRESSURE: 79 MMHG | BODY MASS INDEX: 31.22 KG/M2 | HEART RATE: 82 BPM | OXYGEN SATURATION: 100 %

## 2023-03-17 DIAGNOSIS — F90.2 ATTENTION DEFICIT HYPERACTIVITY DISORDER (ADHD), COMBINED TYPE: Primary | ICD-10-CM

## 2023-03-17 DIAGNOSIS — Z79.899 CONTROLLED SUBSTANCE AGREEMENT SIGNED: ICD-10-CM

## 2023-03-17 PROCEDURE — 90471 IMMUNIZATION ADMIN: CPT | Performed by: NURSE PRACTITIONER

## 2023-03-17 PROCEDURE — 99213 OFFICE O/P EST LOW 20 MIN: CPT | Mod: 25 | Performed by: NURSE PRACTITIONER

## 2023-03-17 PROCEDURE — 80307 DRUG TEST PRSMV CHEM ANLYZR: CPT | Performed by: NURSE PRACTITIONER

## 2023-03-17 PROCEDURE — 90651 9VHPV VACCINE 2/3 DOSE IM: CPT | Performed by: NURSE PRACTITIONER

## 2023-03-17 NOTE — PROGRESS NOTES
"  Assessment & Plan     Attention deficit hyperactivity disorder (ADHD), combined type  Good control with Adderall XR 30 mg and as needed Adderall 10 mg.   Recent fill and will continue with current dosing at this time.       Controlled substance agreement signed  Signed and updated urine tox today.   - Compliance Drug Analysis Urine  - Compliance Drug Analysis Urine               BMI:   Estimated body mass index is 31.1 kg/m  as calculated from the following:    Height as of this encounter: 1.803 m (5' 11\").    Weight as of this encounter: 101.2 kg (223 lb).           No follow-ups on file.    ANIRUDH Elder [facility] CANDIDA Mcclure is a 27 year old presenting for the following health issues:  A.D.H.D      History of Present Illness       Reason for visit:  Medication check    He eats 2-3 servings of fruits and vegetables daily.He consumes 2 sweetened beverage(s) daily.He exercises with enough effort to increase his heart rate 30 to 60 minutes per day.  He exercises with enough effort to increase his heart rate 5 days per week.   He is taking medications regularly.       ADHD Follow-Up (Adult)  Concerns: none  Changes since last visit: None  Taking controlled (daily) medications as prescribed: Yes  Sleep: no problems  Adult ADHD Self-Reporting form given to patient?:  No  Currently in counseling: No    Medication Benefits:   Controlled symptoms: Focus and more present without drifting off into my own thoughts. Stay on task  Uncontrolled symptoms:  None    Medication Side Effects:  Reports:  Sometimes will have stomach issues but maybe cause it is taken with coffee  Sleep Problems? no  ++++++++++++++++++++++++++++++++++++++++++++++++      Review of Systems   Constitutional, HEENT, cardiovascular, pulmonary, gi and gu systems are negative, except as otherwise noted.      Objective    /79 (BP Location: Right arm, Patient Position: Sitting, Cuff Size: Adult Regular) " "  Pulse 82   Temp 98.1  F (36.7  C) (Oral)   Resp 13   Ht 1.803 m (5' 11\")   Wt 101.2 kg (223 lb)   SpO2 100%   BMI 31.10 kg/m    Body mass index is 31.1 kg/m .  Physical Exam   GENERAL: healthy, alert and no distress  RESP: lungs clear to auscultation - no rales, rhonchi or wheezes  CV: regular rate and rhythm, normal S1 S2, no S3 or S4, no murmur, click or rub, no peripheral edema and peripheral pulses strong                    "

## 2023-03-17 NOTE — LETTER
[facility]  03/17/23  Patient: Ren Lawton  YOB: 1995  Medical Record Number: [id number]                                                                                  Non-Opioid Controlled Substance Agreement    This is an agreement between you and your provider regarding safe and appropriate use of controlled substances prescribed by your care team. Controlled substances are?medicines that can cause physical and mental dependence (abuse).     There are strict laws about having and using these medicines. We here at [facility] are  committed to working with you in your efforts to get better. To support you in this work, we'll help you schedule regular office appointments for medicine refills. If we must cancel or change your appointment for any reason, we'll make sure you have enough medicine to last until your next appointment.     As a Provider, I will:     Listen carefully to your concerns while treating you with respect.     Recommend a treatment plan that I believe is in your best interest and may involve therapies other than medicine.      Talk with you often about the possible benefits and the risk of harm of any medicine that we prescribe for you.    Assess the safety of this medicine and check how well it works.      Provide a plan on how to taper (discontinue or go off) using this medicine if the decision is made to stop its use.      ::  As a Patient, I understand controlled substances:       Are prescribed by my care provider to help me function or work and manage my condition(s).?    Are strong medicines and can cause serious side effects.       Need to be taken exactly as prescribed.?Combining controlled substances with certain medicines or chemicals (such as illegal drugs, alcohol, sedatives, sleeping pills, and benzodiazepines) can be dangerous or even fatal.? If I stop taking my medicines suddenly, I may have severe withdrawal symptoms.     The  risks, benefits, and side effects of these medicine(s) were explained to me. I agree that:    1. I will take part in other treatments as advised by my care team. This may be psychiatry or counseling, physical therapy, behavioral therapy, group treatment or a referral to specialist.    2. I will keep all my appointments and understand this is part of the monitoring of controlled substances.?My care team may require an office visit for EVERY controlled substance refill. If I miss appointments or don t follow instructions, my care team may stop my medicine    3. I will take my medicines as prescribed. I will not change the dose or schedule unless my care team tells me to. There will be no refills if I run out early.      4. I may be asked to come to the clinic and complete a urine drug test or complete a pill count. If I don t give a urine sample or participate in a pill count, the care team may stop my medicine.    5. I will only receive controlled substance prescriptions from this clinic. If I am treated by another provider, I will tell them that I am taking controlled substances and that I have a treatment agreement with this provider. I will inform my [facility] care team within one business day if I am given a prescription for any controlled substance by another healthcare provider. My [facility] care team can contact other providers and pharmacists about my use of any medicines.    6. It is up to me to make sure that I don't run out of my medicines on weekends or holidays.?If my care team is willing to refill my prescription without a visit, I must request refills only during office hours. Refills may take up to 3 business days to process. I will use one pharmacy to fill all my controlled substance prescriptions. I will notify the clinic about any changes to my insurance or medicine availability.    7. I am responsible for my prescriptions. If the medicine/prescription is lost, stolen or destroyed,  it will not be replaced.?I also agree not to share controlled substance medicines with anyone.     8. I am aware I should not use any illegal or recreational drugs. I agree not to drink alcohol unless my care team says I can.     9. If I enroll in the [place] Medical Cannabis program, I will tell my care team before my next refill.    10. I will tell my care team right away if I become pregnant, have a new medical problem treated outside of my regular clinic, or have a change in my medicines.     11. I understand that this medicine can affect my thinking, judgment and reaction time.? Alcohol and drugs affect the brain and body, which can affect the safety of my driving. Being under the influence of alcohol or drugs can affect my decision-making, behaviors, personal safety and the safety of others. Driving while impaired (DWI) can occur if a person is driving, operating or in physical control of a car, motorcycle, boat, snowmobile, ATV, motorbike, off-road vehicle or any other motor vehicle ([place] Statute 169A.20). I understand the risk if I choose to drive or operate any vehicle or machinery.    I understand that if I do not follow any of the conditions above, my prescriptions or treatment may be stopped or changed.   I agree that my provider, clinic care team and pharmacy may work with any city, state or federal law enforcement agency that investigates the misuse, sale or other diversion of my controlled medicine. I will allow my provider to discuss my care with, or share a copy of, this agreement with any other treating provider, pharmacy or emergency room where I receive care.     I have read this agreement and have asked questions about anything I did not understand.    ________________________________________________________  Patient Signature - Ren Lawton     ___________________                   Date     ________________________________________________________  Provider Signature - ANIRUDH Elder  CNP       ___________________                   Date     ________________________________________________________  Witness Signature (required if provider not present while patient signing)          ___________________                   Date

## 2023-03-25 LAB — DRUGS UR: NORMAL

## 2023-03-28 ENCOUNTER — MYC REFILL (OUTPATIENT)
Dept: FAMILY MEDICINE | Facility: CLINIC | Age: 28
End: 2023-03-28
Payer: COMMERCIAL

## 2023-03-28 DIAGNOSIS — F90.2 ATTENTION DEFICIT HYPERACTIVITY DISORDER (ADHD), COMBINED TYPE: ICD-10-CM

## 2023-03-28 RX ORDER — DEXTROAMPHETAMINE SACCHARATE, AMPHETAMINE ASPARTATE, DEXTROAMPHETAMINE SULFATE AND AMPHETAMINE SULFATE 2.5; 2.5; 2.5; 2.5 MG/1; MG/1; MG/1; MG/1
10 TABLET ORAL DAILY
Qty: 30 TABLET | Refills: 0 | Status: SHIPPED | OUTPATIENT
Start: 2023-03-31 | End: 2023-03-31

## 2023-03-28 RX ORDER — DEXTROAMPHETAMINE SACCHARATE, AMPHETAMINE ASPARTATE MONOHYDRATE, DEXTROAMPHETAMINE SULFATE AND AMPHETAMINE SULFATE 7.5; 7.5; 7.5; 7.5 MG/1; MG/1; MG/1; MG/1
30 CAPSULE, EXTENDED RELEASE ORAL DAILY
Qty: 30 CAPSULE | Refills: 0 | Status: SHIPPED | OUTPATIENT
Start: 2023-03-31 | End: 2023-04-27

## 2023-03-30 ENCOUNTER — TELEPHONE (OUTPATIENT)
Dept: FAMILY MEDICINE | Facility: CLINIC | Age: 28
End: 2023-03-30
Payer: COMMERCIAL

## 2023-03-30 DIAGNOSIS — F90.2 ATTENTION DEFICIT HYPERACTIVITY DISORDER (ADHD), COMBINED TYPE: ICD-10-CM

## 2023-03-30 NOTE — TELEPHONE ENCOUNTER
Pt in clinic. Would like his prescriptions sent to [facility]. The [facility] it was sent to does not have the dosages he needs. They told him that [facility] has Adderall 5 mg and 15 mg. Please call him with questions.

## 2023-03-31 DIAGNOSIS — F90.2 ATTENTION DEFICIT HYPERACTIVITY DISORDER (ADHD), COMBINED TYPE: ICD-10-CM

## 2023-03-31 RX ORDER — DEXTROAMPHETAMINE SACCHARATE, AMPHETAMINE ASPARTATE, DEXTROAMPHETAMINE SULFATE AND AMPHETAMINE SULFATE 1.25; 1.25; 1.25; 1.25 MG/1; MG/1; MG/1; MG/1
5-10 TABLET ORAL DAILY
Qty: 60 TABLET | Refills: 0 | Status: CANCELLED | OUTPATIENT
Start: 2023-03-31

## 2023-03-31 RX ORDER — DEXTROAMPHETAMINE SACCHARATE, AMPHETAMINE ASPARTATE, DEXTROAMPHETAMINE SULFATE AND AMPHETAMINE SULFATE 1.25; 1.25; 1.25; 1.25 MG/1; MG/1; MG/1; MG/1
5-10 TABLET ORAL DAILY
Qty: 60 TABLET | Refills: 0 | Status: SHIPPED | OUTPATIENT
Start: 2023-03-31 | End: 2023-03-31

## 2023-03-31 RX ORDER — DEXTROAMPHETAMINE SACCHARATE, AMPHETAMINE ASPARTATE, DEXTROAMPHETAMINE SULFATE AND AMPHETAMINE SULFATE 1.25; 1.25; 1.25; 1.25 MG/1; MG/1; MG/1; MG/1
5-10 TABLET ORAL DAILY
Qty: 60 TABLET | Refills: 0 | Status: SHIPPED | OUTPATIENT
Start: 2023-03-31 | End: 2023-06-21

## 2023-03-31 NOTE — TELEPHONE ENCOUNTER
Kaylene- see below message is requesting different doses than on med list.  If more than 1 tab usually insurance issue.  Please advise.  Malathi Lowe RN

## 2023-03-31 NOTE — TELEPHONE ENCOUNTER
Sent to [place] pharmacy as 5 mg tablets. Take 5-10 mg daily.   Thank you  ANIRUDH Elder CNP on 3/31/2023 at 8:16 AM

## 2023-03-31 NOTE — TELEPHONE ENCOUNTER
Pt walked into AV clinic stating that [facility] pharmacy does not have his Adderall prescription     RN called [facility] pharmacy and spoke with Bony joseph reports:  *30mg XR tablets - No stock  *10mg tabs - 25 tabs left  *5mg tabs - in stock  -Pt is now requesting prescriptions be sent back to [facility] pharmacy as they have stock    RN called [facility] pharmacy and spoke with pharmacistAbilio reports:  *15mg XR tablets - in stock  *10mg tabs - in stock  *5mg tabs - in stock  -RN pended medications for ANIRUDH Elder CNP to review and sign, changed to 60 tablets of 15mg XR    Routing to HAZEL BIRD RN  Patient Advocate Liaison - PAL RN  [facility]  [phone]

## 2023-04-03 RX ORDER — DEXTROAMPHETAMINE SACCHARATE, AMPHETAMINE ASPARTATE MONOHYDRATE, DEXTROAMPHETAMINE SULFATE AND AMPHETAMINE SULFATE 3.75; 3.75; 3.75; 3.75 MG/1; MG/1; MG/1; MG/1
30 CAPSULE, EXTENDED RELEASE ORAL DAILY
Qty: 60 CAPSULE | Refills: 0 | Status: SHIPPED | OUTPATIENT
Start: 2023-04-03 | End: 2023-06-21

## 2023-04-03 RX ORDER — DEXTROAMPHETAMINE SACCHARATE, AMPHETAMINE ASPARTATE, DEXTROAMPHETAMINE SULFATE AND AMPHETAMINE SULFATE 2.5; 2.5; 2.5; 2.5 MG/1; MG/1; MG/1; MG/1
10 TABLET ORAL DAILY
Qty: 30 TABLET | Refills: 0 | Status: SHIPPED | OUTPATIENT
Start: 2023-04-03 | End: 2023-04-27

## 2023-04-03 NOTE — TELEPHONE ENCOUNTER
Please call pharmacy to confirm that none of those scripts have been picked up at [facility]. If not, please cancel scripts with them and then I can resend to [facility].    Armand Dunlap PA-C on 4/3/2023 at 7:23 AM (covering for Kaylene Geller)

## 2023-04-03 NOTE — TELEPHONE ENCOUNTER
Called [facility] pharmacy, informs pt picked up adderall 5 mg and confirmed did not  other 2 adderall prescriptions, will need 15 and 10 mg prescriptions sent, removed 5 MG rx, routed to [facility], route to inform pt when sent  Rolanda Tripp, RN, BSN  [facility]

## 2023-04-04 NOTE — TELEPHONE ENCOUNTER
His last fill was 11/12 so he can have it filled now.  Susan Haase, CNP   Progress Notes  Akanksha Ferrera CCC-SLP (Speech and Language Pathologist)   Speech Pathology     Progress Notes  Akanksha Ferrera CCC-SLP (Speech and Language Pathologist)   Speech Pathology     Outpatient Pediatric Speech Therapy Treatment Note     Date: 4/4/2023    Patient Name: Marlee Pope  MRN: [id number]  Therapy Diagnosis: Receptive and Expressive language delay  Physician: Kaden Spear MD   Physician Orders: Evaluate and treat   Medical Diagnosis: Speech Delay   Age: 6 y.o. 3 m.o.     Visit # / Visits Authorized: 34/40   Date of Evaluation: 6/16/22   Plan of Care Expiration Date: 6/20/2023  Authorization Date: 6/10/22-6/10/23         Time In: 3:35 p.m.  Time Out: 4:05 p.m.  Total Billable Time: 30  Precautions: standard      Subjective:   Pt Parent reports: Mom reports no complaints. Mom reported that patient was doing well in school.  No mention of home exercise program.    Response to previous treatment: tolerated previous session without difficulty  Mom brought Marlee to therapy today.  Pain: Marlee was unable to rate pain on a numeric scale, but no pain behaviors were noted in today's session.  Objective:      Procedure Min.   Speech- Language- Voice Therapy    30      Charges Billed/# of units: 02085/1     Long term goal:  To increase her receptive and expressive language skills to a level more commensurate to patient's chronological age or max potential for functional communication.     Short Term Goals:  Current Progress:   1.  Follow simple 1 part commands with 80% accuracy Independently.  Progressing/ Not Met 4/4/2023  25% acc Independently and 30% accuracy after cues/imitation      2.  Receptively identify basic body parts with 80% accuracy Independently.  Progressing/ Not Met 4/4/2023  NT   3.  Participate in activities/tasks for 3-4 minutes each.  Progressing/ Not Met 4/4/2023  Approximately 3-4 minutes with cues.    4.  Imitate CV combos then whole words with 80% accuracy.  Progressing/ Not Met  4/4//2023   Patient imitated 10 words today and sang wheels on the bus while playing with bus.   5.  Use vocalizations and gestures to request wants/needs with 60% accuracy Independently.  Progressing/ Not Met 4/4/2023   Patient vocalized/repeated words but did not make request. Pt repeated I+want+object.   6.  Increase patient's expressive vocabulary to 40 Independent consistent words.  Progressing/ Not Met 4/4/2023   Approximately 10 Independent words today                  Patient Education/Response:   Parent/guardian is compliant with HEP and POC. Parent/guardian verbalized understanding of ST goals and progression towards goals.     Written Home Exercises Provided: Patient instructed to cont prior HEP.  Strategies / Exercises were reviewed and Marlee was able to demonstrate them prior to the end of the session.  Marlee's parent/guardian demonstrated fair  understanding of the education provided.      See EMR under Patient Instructions for exercises provided prior visit  Assessment:   Marlee is slowly progressing toward her goals. Current goals remain appropriate. Patient sat in chair at table throughout the majority of session today. Patient participated well with cues. Goals will be added and re-assessed as needed.       Pt prognosis is Good. Pt will continue to benefit from skilled outpatient speech and language therapy to address the deficits listed in the problem list on initial evaluation, provide pt/family education and to maximize pt's level of independence in the home and community environment. D/C to HEP when max potential with goals is achieved.      Medical necessity is demonstrated by the following IMPAIRMENTS:  Receptive and Expressive language delay     Barriers to Therapy: Decreased attention to task     Pt's spiritual, cultural and educational needs considered and pt agreeable to plan of care and goals.  Plan:   Continue SPEECH THERAPY PLAN OF CARE for 1 x week for 12 weeks to increase patient's  receptive and expressive language skills for functional communication. Patient will be discharged to a HOME EXERCISE PROGRAM when max functional potential has been reached with goals.          Konstantin Ferrera M.S.,  CCC-SLP   4/4/2023

## 2023-04-22 ENCOUNTER — HEALTH MAINTENANCE LETTER (OUTPATIENT)
Age: 28
End: 2023-04-22

## 2023-04-27 DIAGNOSIS — F90.2 ATTENTION DEFICIT HYPERACTIVITY DISORDER (ADHD), COMBINED TYPE: ICD-10-CM

## 2023-04-27 RX ORDER — DEXTROAMPHETAMINE SACCHARATE, AMPHETAMINE ASPARTATE, DEXTROAMPHETAMINE SULFATE AND AMPHETAMINE SULFATE 2.5; 2.5; 2.5; 2.5 MG/1; MG/1; MG/1; MG/1
10 TABLET ORAL DAILY
Qty: 30 TABLET | Refills: 0 | Status: SHIPPED | OUTPATIENT
Start: 2023-04-30 | End: 2023-05-24

## 2023-04-27 RX ORDER — DEXTROAMPHETAMINE SACCHARATE, AMPHETAMINE ASPARTATE MONOHYDRATE, DEXTROAMPHETAMINE SULFATE AND AMPHETAMINE SULFATE 7.5; 7.5; 7.5; 7.5 MG/1; MG/1; MG/1; MG/1
30 CAPSULE, EXTENDED RELEASE ORAL DAILY
Qty: 30 CAPSULE | Refills: 0 | Status: SHIPPED | OUTPATIENT
Start: 2023-05-02 | End: 2023-05-24

## 2023-04-27 NOTE — TELEPHONE ENCOUNTER
Pt doesn't know if [facility] pharmacy has the medications available. Pt did not call, he just reports to send it to the pharmacy. Advised pt has no refills either and pt reiterates to nurse to just send it through.    Requested Prescriptions   Pending Prescriptions Disp Refills     amphetamine-dextroamphetamine (ADDERALL) 10 MG tablet 30 tablet 0     Sig: Take 1 tablet (10 mg) by mouth daily       There is no refill protocol information for this order        amphetamine-dextroamphetamine (ADDERALL XR) 30 MG 24 hr capsule 30 capsule 0     Sig: Take 1 capsule (30 mg) by mouth daily       There is no refill protocol information for this order          Routing refill request to provider for review/approval because:  Drug not on the [facility] refill protocol     Trina Lincoln RN on 4/27/2023 at 12:21 PM

## 2023-05-24 ENCOUNTER — MYC REFILL (OUTPATIENT)
Dept: FAMILY MEDICINE | Facility: CLINIC | Age: 28
End: 2023-05-24
Payer: COMMERCIAL

## 2023-05-24 DIAGNOSIS — F90.2 ATTENTION DEFICIT HYPERACTIVITY DISORDER (ADHD), COMBINED TYPE: ICD-10-CM

## 2023-05-25 RX ORDER — DEXTROAMPHETAMINE SACCHARATE, AMPHETAMINE ASPARTATE MONOHYDRATE, DEXTROAMPHETAMINE SULFATE AND AMPHETAMINE SULFATE 7.5; 7.5; 7.5; 7.5 MG/1; MG/1; MG/1; MG/1
30 CAPSULE, EXTENDED RELEASE ORAL DAILY
Qty: 30 CAPSULE | Refills: 0 | Status: SHIPPED | OUTPATIENT
Start: 2023-05-31 | End: 2023-06-21

## 2023-05-25 RX ORDER — DEXTROAMPHETAMINE SACCHARATE, AMPHETAMINE ASPARTATE, DEXTROAMPHETAMINE SULFATE AND AMPHETAMINE SULFATE 2.5; 2.5; 2.5; 2.5 MG/1; MG/1; MG/1; MG/1
10 TABLET ORAL DAILY
Qty: 30 TABLET | Refills: 0 | Status: SHIPPED | OUTPATIENT
Start: 2023-05-31 | End: 2023-06-21

## 2023-05-25 NOTE — TELEPHONE ENCOUNTER
reviewed. Last filled on 5/1/23 so refilled with start date of 5/31/23.    Armand Dunlap PA-C on 5/25/2023 at 11:23 AM (covering for Kaylene Geller)

## 2023-06-21 ENCOUNTER — VIRTUAL VISIT (OUTPATIENT)
Dept: FAMILY MEDICINE | Facility: CLINIC | Age: 28
End: 2023-06-21
Payer: COMMERCIAL

## 2023-06-21 DIAGNOSIS — F12.10 TETRAHYDROCANNABINOL (THC) USE DISORDER, MILD, ABUSE: ICD-10-CM

## 2023-06-21 DIAGNOSIS — F90.2 ATTENTION DEFICIT HYPERACTIVITY DISORDER (ADHD), COMBINED TYPE: Primary | ICD-10-CM

## 2023-06-21 PROCEDURE — 99213 OFFICE O/P EST LOW 20 MIN: CPT | Mod: 93 | Performed by: NURSE PRACTITIONER

## 2023-06-21 RX ORDER — DEXTROAMPHETAMINE SACCHARATE, AMPHETAMINE ASPARTATE, DEXTROAMPHETAMINE SULFATE AND AMPHETAMINE SULFATE 2.5; 2.5; 2.5; 2.5 MG/1; MG/1; MG/1; MG/1
10 TABLET ORAL DAILY
Qty: 30 TABLET | Refills: 0 | Status: SHIPPED | OUTPATIENT
Start: 2023-08-28 | End: 2024-01-17

## 2023-06-21 RX ORDER — DEXTROAMPHETAMINE SACCHARATE, AMPHETAMINE ASPARTATE MONOHYDRATE, DEXTROAMPHETAMINE SULFATE AND AMPHETAMINE SULFATE 7.5; 7.5; 7.5; 7.5 MG/1; MG/1; MG/1; MG/1
30 CAPSULE, EXTENDED RELEASE ORAL DAILY
Qty: 30 CAPSULE | Refills: 0 | Status: SHIPPED | OUTPATIENT
Start: 2023-06-30 | End: 2024-01-17

## 2023-06-21 RX ORDER — DEXTROAMPHETAMINE SACCHARATE, AMPHETAMINE ASPARTATE MONOHYDRATE, DEXTROAMPHETAMINE SULFATE AND AMPHETAMINE SULFATE 7.5; 7.5; 7.5; 7.5 MG/1; MG/1; MG/1; MG/1
30 CAPSULE, EXTENDED RELEASE ORAL DAILY
Qty: 30 CAPSULE | Refills: 0 | Status: SHIPPED | OUTPATIENT
Start: 2023-08-28 | End: 2024-01-17

## 2023-06-21 RX ORDER — DEXTROAMPHETAMINE SACCHARATE, AMPHETAMINE ASPARTATE, DEXTROAMPHETAMINE SULFATE AND AMPHETAMINE SULFATE 2.5; 2.5; 2.5; 2.5 MG/1; MG/1; MG/1; MG/1
10 TABLET ORAL DAILY
Qty: 30 TABLET | Refills: 0 | Status: SHIPPED | OUTPATIENT
Start: 2023-07-29 | End: 2024-01-17

## 2023-06-21 RX ORDER — DEXTROAMPHETAMINE SACCHARATE, AMPHETAMINE ASPARTATE MONOHYDRATE, DEXTROAMPHETAMINE SULFATE AND AMPHETAMINE SULFATE 7.5; 7.5; 7.5; 7.5 MG/1; MG/1; MG/1; MG/1
30 CAPSULE, EXTENDED RELEASE ORAL DAILY
Qty: 30 CAPSULE | Refills: 0 | Status: SHIPPED | OUTPATIENT
Start: 2023-07-29 | End: 2024-01-17

## 2023-06-21 RX ORDER — DEXTROAMPHETAMINE SACCHARATE, AMPHETAMINE ASPARTATE, DEXTROAMPHETAMINE SULFATE AND AMPHETAMINE SULFATE 2.5; 2.5; 2.5; 2.5 MG/1; MG/1; MG/1; MG/1
10 TABLET ORAL DAILY
Qty: 30 TABLET | Refills: 0 | Status: SHIPPED | OUTPATIENT
Start: 2023-06-30 | End: 2024-01-17

## 2023-06-21 NOTE — PROGRESS NOTES
"Ren is a 27 year old who is being evaluated via a billable telephone visit.      What phone number would you like to be contacted at? [phone]  How would you like to obtain your AVS? Storm    Distant Location (provider location):  Off-site    Assessment & Plan     Attention deficit hyperactivity disorder (ADHD), combined type  Controlled with use of Adderall XR 30 mg and as needed Adderall 10 mg in afternoon.   Early fills on Adderalls, discussed.   Follow-up 6 month for F2F visit.   - amphetamine-dextroamphetamine (ADDERALL) 10 MG tablet  Dispense: 30 tablet; Refill: 0  - amphetamine-dextroamphetamine (ADDERALL XR) 30 MG 24 hr capsule  Dispense: 30 capsule; Refill: 0  - amphetamine-dextroamphetamine (ADDERALL XR) 30 MG 24 hr capsule  Dispense: 30 capsule; Refill: 0  - amphetamine-dextroamphetamine (ADDERALL XR) 30 MG 24 hr capsule  Dispense: 30 capsule; Refill: 0  - amphetamine-dextroamphetamine (ADDERALL) 10 MG tablet  Dispense: 30 tablet; Refill: 0  - amphetamine-dextroamphetamine (ADDERALL) 10 MG tablet  Dispense: 30 tablet; Refill: 0    Tetrahydrocannabinol (THC) use disorder, mild, abuse  Found on urine tox screen. Denies marijuana use, use of [place] 9 with a friend. Will stop use to continue ADHD management.                BMI:   Estimated body mass index is 31.1 kg/m  as calculated from the following:    Height as of 3/17/23: 1.803 m (5' 11\").    Weight as of 3/17/23: 101.2 kg (223 lb).           ANIRUDH Elder CNP  [facility]   Ren is a 27 year old, presenting for the following health issues:  Recheck Medication    History of Present Illness       Reason for visit:  Medication check    He eats 0-1 servings of fruits and vegetables daily.He consumes 1 sweetened beverage(s) daily.He exercises with enough effort to increase his heart rate 60 or more minutes per day.  He exercises with enough effort to increase his heart rate 4 days per week.   He is " "taking medications regularly.   ADHD Follow-Up (Adult)  Concerns: none  Changes since last visit: Stable  Taking controlled (daily) medications as prescribed: Yes  Sleep: no problems  Adult ADHD Self-Reporting form given to patient?:  No  Currently in counseling: No    Medication Benefits:   Controlled symptoms: Hyperactivity - motor restlessness, Attention span, Distractability, Finishing tasks and Impulse control  Uncontrolled symptoms:  None    Medication Side Effects:  Reports:  none    ++++++++++++++++++++++++++++++++++++++++++++++++          Review of Systems   Constitutional, HEENT, cardiovascular, pulmonary, gi and gu systems are negative, except as otherwise noted.      Objective    Vitals - Patient Reported  Weight (Patient Reported): 93 kg (205 lb)  Height (Patient Reported): 180.3 cm (5' 11\")  BMI (Based on Pt Reported Ht/Wt): 28.59      Vitals:  No vitals were obtained today due to virtual visit.    Physical Exam   healthy, alert and no distress  PSYCH: Alert and oriented times 3; coherent speech, normal   rate and volume, able to articulate logical thoughts, able   to abstract reason, no tangential thoughts, no hallucinations   or delusions  His affect is normal  RESP: No cough, no audible wheezing, able to talk in full sentences  Remainder of exam unable to be completed due to telephone visits                Phone call duration: 11 minutes    "

## 2023-06-29 ENCOUNTER — TELEPHONE (OUTPATIENT)
Dept: FAMILY MEDICINE | Facility: CLINIC | Age: 28
End: 2023-06-29
Payer: COMMERCIAL

## 2023-06-29 NOTE — TELEPHONE ENCOUNTER
Unable to fill early due to previous early fills on record.   Thank you  ANIRUDH Elder CNP on 6/29/2023 at 12:54 PM

## 2023-06-29 NOTE — TELEPHONE ENCOUNTER
Nilesh calling from [facility].  Patient called to see if can fill both Adderall RX's one day early as he is leaving [facility].  Please advise.  Malathi Lowe RN

## 2023-09-21 ENCOUNTER — OFFICE VISIT (OUTPATIENT)
Dept: FAMILY MEDICINE | Facility: CLINIC | Age: 28
End: 2023-09-21
Payer: COMMERCIAL

## 2023-09-21 VITALS
SYSTOLIC BLOOD PRESSURE: 124 MMHG | HEIGHT: 71 IN | BODY MASS INDEX: 31.72 KG/M2 | HEART RATE: 67 BPM | WEIGHT: 226.6 LBS | OXYGEN SATURATION: 100 % | TEMPERATURE: 97.5 F | RESPIRATION RATE: 16 BRPM | DIASTOLIC BLOOD PRESSURE: 72 MMHG

## 2023-09-21 DIAGNOSIS — F12.10 TETRAHYDROCANNABINOL (THC) USE DISORDER, MILD, ABUSE: ICD-10-CM

## 2023-09-21 DIAGNOSIS — F90.2 ATTENTION DEFICIT HYPERACTIVITY DISORDER (ADHD), COMBINED TYPE: Primary | ICD-10-CM

## 2023-09-21 LAB
CANNABINOIDS UR QL SCN: ABNORMAL
CREAT UR-MCNC: 8 MG/DL

## 2023-09-21 PROCEDURE — G0480 DRUG TEST DEF 1-7 CLASSES: HCPCS | Mod: 59 | Performed by: NURSE PRACTITIONER

## 2023-09-21 PROCEDURE — 99214 OFFICE O/P EST MOD 30 MIN: CPT | Performed by: NURSE PRACTITIONER

## 2023-09-21 PROCEDURE — 80307 DRUG TEST PRSMV CHEM ANLYZR: CPT | Performed by: NURSE PRACTITIONER

## 2023-09-21 RX ORDER — DEXTROAMPHETAMINE SACCHARATE, AMPHETAMINE ASPARTATE MONOHYDRATE, DEXTROAMPHETAMINE SULFATE AND AMPHETAMINE SULFATE 7.5; 7.5; 7.5; 7.5 MG/1; MG/1; MG/1; MG/1
30 CAPSULE, EXTENDED RELEASE ORAL DAILY
Qty: 30 CAPSULE | Refills: 0 | Status: SHIPPED | OUTPATIENT
Start: 2023-09-27 | End: 2023-10-23

## 2023-09-21 RX ORDER — DEXTROAMPHETAMINE SACCHARATE, AMPHETAMINE ASPARTATE, DEXTROAMPHETAMINE SULFATE AND AMPHETAMINE SULFATE 2.5; 2.5; 2.5; 2.5 MG/1; MG/1; MG/1; MG/1
10 TABLET ORAL DAILY
Qty: 30 TABLET | Refills: 0 | Status: SHIPPED | OUTPATIENT
Start: 2023-09-27 | End: 2023-10-23

## 2023-09-21 NOTE — PROGRESS NOTES
"  Assessment & Plan     Attention deficit hyperactivity disorder (ADHD), combined type  Controlled. Will continue at current dosing.   - amphetamine-dextroamphetamine (ADDERALL XR) 30 MG 24 hr capsule  Dispense: 30 capsule; Refill: 0  - amphetamine-dextroamphetamine (ADDERALL) 10 MG tablet  Dispense: 30 tablet; Refill: 0  - Drug Confirmation Panel Urine with Creat - lab collect  - Drug Confirmation Panel Urine with Creat - lab collect    Tetrahydrocannabinol (THC) use disorder, mild, abuse  THC on previous urine screening. Previously discussed cessation of use and patient admits to stopping use.   Will update urine today.     Patient wishes to establish in [facility] as current PCP will no longer be available.                BMI:   Estimated body mass index is 31.6 kg/m  as calculated from the following:    Height as of this encounter: 1.803 m (5' 11\").    Weight as of this encounter: 102.8 kg (226 lb 9.6 oz).           ANIRUDH Elder [facility]    Pilo Mcclure is a 27 year old, presenting for the following health issues:  A.D.H.D        9/21/2023    10:45 AM   Additional Questions   Roomed by Maria Elena English       History of Present Illness       Reason for visit:  Med check    He eats 2-3 servings of fruits and vegetables daily.He consumes 2 sweetened beverage(s) daily.He exercises with enough effort to increase his heart rate 30 to 60 minutes per day.  He exercises with enough effort to increase his heart rate 4 days per week. He is missing 1 dose(s) of medications per week.     ADHD Follow-Up (Adult)  Concerns: NONE  Changes since last visit: None  Taking controlled (daily) medications as prescribed: Yes  Sleep: no problems  Adult ADHD Self-Reporting form given to patient?:  No  Currently in counseling: No    Medication Benefits:   Controlled symptoms: staying focus and not drifting off  Uncontrolled symptoms:  None    Medication Side Effects:  Reports:  states " "same as last time  Sleep Problems? no  ++++++++++++++++++++++++++++++++++++++++++++++++    Adderall XR 30 mg daily, sometimes holds on weekends.   Adderall IR 10 mg as above.   Good control of symptoms.       Review of Systems   Constitutional, HEENT, cardiovascular, pulmonary, gi and gu systems are negative, except as otherwise noted.      Objective    /72 (BP Location: Right arm, Patient Position: Sitting, Cuff Size: Adult Regular)   Pulse 67   Temp 97.5  F (36.4  C) (Oral)   Resp 16   Ht 1.803 m (5' 11\")   Wt 102.8 kg (226 lb 9.6 oz)   SpO2 100%   BMI 31.60 kg/m    Body mass index is 31.6 kg/m .  Physical Exam                         "

## 2023-10-23 ENCOUNTER — MYC REFILL (OUTPATIENT)
Dept: FAMILY MEDICINE | Facility: CLINIC | Age: 28
End: 2023-10-23
Payer: COMMERCIAL

## 2023-10-23 DIAGNOSIS — F90.2 ATTENTION DEFICIT HYPERACTIVITY DISORDER (ADHD), COMBINED TYPE: ICD-10-CM

## 2023-10-24 RX ORDER — DEXTROAMPHETAMINE SACCHARATE, AMPHETAMINE ASPARTATE MONOHYDRATE, DEXTROAMPHETAMINE SULFATE AND AMPHETAMINE SULFATE 7.5; 7.5; 7.5; 7.5 MG/1; MG/1; MG/1; MG/1
30 CAPSULE, EXTENDED RELEASE ORAL DAILY
Qty: 30 CAPSULE | Refills: 0 | Status: SHIPPED | OUTPATIENT
Start: 2023-10-27 | End: 2023-11-27

## 2023-10-24 RX ORDER — DEXTROAMPHETAMINE SACCHARATE, AMPHETAMINE ASPARTATE, DEXTROAMPHETAMINE SULFATE AND AMPHETAMINE SULFATE 2.5; 2.5; 2.5; 2.5 MG/1; MG/1; MG/1; MG/1
10 TABLET ORAL DAILY
Qty: 30 TABLET | Refills: 0 | Status: SHIPPED | OUTPATIENT
Start: 2023-10-27 | End: 2023-11-27

## 2023-10-24 NOTE — TELEPHONE ENCOUNTER
reviewed. Refilled.    Armand Dunlap PA-C on 10/24/2023 at 12:50 PM (covering for Kaylene Geller)

## 2023-10-24 NOTE — TELEPHONE ENCOUNTER
I have never seen this patient.  Refills need to be managed by PCP or PCP's clinic.    Frances Spicer CNP

## 2023-10-27 ENCOUNTER — MYC REFILL (OUTPATIENT)
Dept: FAMILY MEDICINE | Facility: CLINIC | Age: 28
End: 2023-10-27
Payer: COMMERCIAL

## 2023-10-27 DIAGNOSIS — F90.2 ATTENTION DEFICIT HYPERACTIVITY DISORDER (ADHD), COMBINED TYPE: ICD-10-CM

## 2023-10-27 RX ORDER — DEXTROAMPHETAMINE SACCHARATE, AMPHETAMINE ASPARTATE, DEXTROAMPHETAMINE SULFATE AND AMPHETAMINE SULFATE 2.5; 2.5; 2.5; 2.5 MG/1; MG/1; MG/1; MG/1
10 TABLET ORAL DAILY
Qty: 30 TABLET | Refills: 0 | Status: CANCELLED | OUTPATIENT
Start: 2023-10-27

## 2023-10-27 RX ORDER — DEXTROAMPHETAMINE SACCHARATE, AMPHETAMINE ASPARTATE MONOHYDRATE, DEXTROAMPHETAMINE SULFATE AND AMPHETAMINE SULFATE 7.5; 7.5; 7.5; 7.5 MG/1; MG/1; MG/1; MG/1
30 CAPSULE, EXTENDED RELEASE ORAL DAILY
Qty: 30 CAPSULE | Refills: 0 | Status: CANCELLED | OUTPATIENT
Start: 2023-10-27

## 2023-10-31 NOTE — LETTER
January 6, 2018      To Whom It May Concern:      Ren Lawton was seen in our Emergency Department today, 01/06/18.  I expect his condition to improve over the next 1-2 days.  He may return to work when improved.    Sincerely,        Meeta Gracia PA-C         Medical decision making Medical decision making

## 2023-11-27 ENCOUNTER — MYC REFILL (OUTPATIENT)
Dept: FAMILY MEDICINE | Facility: CLINIC | Age: 28
End: 2023-11-27
Payer: COMMERCIAL

## 2023-11-27 DIAGNOSIS — F90.2 ATTENTION DEFICIT HYPERACTIVITY DISORDER (ADHD), COMBINED TYPE: ICD-10-CM

## 2023-11-27 RX ORDER — DEXTROAMPHETAMINE SACCHARATE, AMPHETAMINE ASPARTATE, DEXTROAMPHETAMINE SULFATE AND AMPHETAMINE SULFATE 2.5; 2.5; 2.5; 2.5 MG/1; MG/1; MG/1; MG/1
10 TABLET ORAL DAILY
Qty: 30 TABLET | Refills: 0 | OUTPATIENT
Start: 2023-11-27

## 2023-11-27 RX ORDER — DEXTROAMPHETAMINE SACCHARATE, AMPHETAMINE ASPARTATE, DEXTROAMPHETAMINE SULFATE AND AMPHETAMINE SULFATE 2.5; 2.5; 2.5; 2.5 MG/1; MG/1; MG/1; MG/1
10 TABLET ORAL DAILY
Qty: 30 TABLET | Refills: 0 | Status: SHIPPED | OUTPATIENT
Start: 2023-11-27 | End: 2023-12-26

## 2023-11-27 RX ORDER — DEXTROAMPHETAMINE SACCHARATE, AMPHETAMINE ASPARTATE MONOHYDRATE, DEXTROAMPHETAMINE SULFATE AND AMPHETAMINE SULFATE 7.5; 7.5; 7.5; 7.5 MG/1; MG/1; MG/1; MG/1
30 CAPSULE, EXTENDED RELEASE ORAL DAILY
Qty: 30 CAPSULE | Refills: 0 | Status: SHIPPED | OUTPATIENT
Start: 2023-11-27 | End: 2023-12-26

## 2023-11-27 RX ORDER — DEXTROAMPHETAMINE SACCHARATE, AMPHETAMINE ASPARTATE MONOHYDRATE, DEXTROAMPHETAMINE SULFATE AND AMPHETAMINE SULFATE 7.5; 7.5; 7.5; 7.5 MG/1; MG/1; MG/1; MG/1
30 CAPSULE, EXTENDED RELEASE ORAL DAILY
Qty: 30 CAPSULE | Refills: 0 | OUTPATIENT
Start: 2023-11-27

## 2023-11-27 NOTE — TELEPHONE ENCOUNTER
Pt calls, informed rx refilled, pt has appointment scheduled   Rolanda Tripp, RN, BSN  [facility] [place]     1.96

## 2023-11-27 NOTE — TELEPHONE ENCOUNTER
Last OV 9/21/23 with Kaylene Geller      Appointments in Next Year      Feb 02, 2024  2:30 PM  (Arrive by 2:10 PM)  Adult Preventative Visit with ANIRUDH Cano CNP  [facility] ([facility] ) [phone]

## 2023-12-26 DIAGNOSIS — F90.2 ATTENTION DEFICIT HYPERACTIVITY DISORDER (ADHD), COMBINED TYPE: ICD-10-CM

## 2023-12-26 RX ORDER — DEXTROAMPHETAMINE SACCHARATE, AMPHETAMINE ASPARTATE, DEXTROAMPHETAMINE SULFATE AND AMPHETAMINE SULFATE 2.5; 2.5; 2.5; 2.5 MG/1; MG/1; MG/1; MG/1
10 TABLET ORAL DAILY
Qty: 30 TABLET | Refills: 0 | Status: SHIPPED | OUTPATIENT
Start: 2023-12-26 | End: 2024-01-17

## 2023-12-26 RX ORDER — DEXTROAMPHETAMINE SACCHARATE, AMPHETAMINE ASPARTATE MONOHYDRATE, DEXTROAMPHETAMINE SULFATE AND AMPHETAMINE SULFATE 7.5; 7.5; 7.5; 7.5 MG/1; MG/1; MG/1; MG/1
30 CAPSULE, EXTENDED RELEASE ORAL DAILY
Qty: 30 CAPSULE | Refills: 0 | Status: SHIPPED | OUTPATIENT
Start: 2023-12-26 | End: 2024-01-17

## 2023-12-26 NOTE — TELEPHONE ENCOUNTER
reviewed. Visit due in March and has one schedule with Talya Spicer in February. Refilled.    Armand Dunlap PA-C on 12/26/2023 at 11:45 AM (covering for Kaylene Geller)

## 2023-12-26 NOTE — TELEPHONE ENCOUNTER
Appointments in Next Year      Feb 02, 2024  2:30 PM  (Arrive by 2:10 PM)  Adult Preventative Visit with ANIRUDH Cano CNP  [facility] ([facility] ) [phone]

## 2024-01-17 DIAGNOSIS — F90.2 ATTENTION DEFICIT HYPERACTIVITY DISORDER (ADHD), COMBINED TYPE: ICD-10-CM

## 2024-01-17 RX ORDER — DEXTROAMPHETAMINE SACCHARATE, AMPHETAMINE ASPARTATE MONOHYDRATE, DEXTROAMPHETAMINE SULFATE AND AMPHETAMINE SULFATE 7.5; 7.5; 7.5; 7.5 MG/1; MG/1; MG/1; MG/1
30 CAPSULE, EXTENDED RELEASE ORAL DAILY
Qty: 30 CAPSULE | Refills: 0 | Status: SHIPPED | OUTPATIENT
Start: 2024-02-17 | End: 2024-03-18

## 2024-01-17 RX ORDER — DEXTROAMPHETAMINE SACCHARATE, AMPHETAMINE ASPARTATE MONOHYDRATE, DEXTROAMPHETAMINE SULFATE AND AMPHETAMINE SULFATE 7.5; 7.5; 7.5; 7.5 MG/1; MG/1; MG/1; MG/1
30 CAPSULE, EXTENDED RELEASE ORAL DAILY
Qty: 30 CAPSULE | Refills: 0 | Status: SHIPPED | OUTPATIENT
Start: 2024-03-19 | End: 2024-04-10

## 2024-01-17 RX ORDER — DEXTROAMPHETAMINE SACCHARATE, AMPHETAMINE ASPARTATE MONOHYDRATE, DEXTROAMPHETAMINE SULFATE AND AMPHETAMINE SULFATE 7.5; 7.5; 7.5; 7.5 MG/1; MG/1; MG/1; MG/1
30 CAPSULE, EXTENDED RELEASE ORAL DAILY
Qty: 30 CAPSULE | Refills: 0 | Status: SHIPPED | OUTPATIENT
Start: 2024-01-17 | End: 2024-02-16

## 2024-01-17 RX ORDER — DEXTROAMPHETAMINE SACCHARATE, AMPHETAMINE ASPARTATE MONOHYDRATE, DEXTROAMPHETAMINE SULFATE AND AMPHETAMINE SULFATE 7.5; 7.5; 7.5; 7.5 MG/1; MG/1; MG/1; MG/1
30 CAPSULE, EXTENDED RELEASE ORAL DAILY
Qty: 30 CAPSULE | Refills: 0 | Status: CANCELLED | OUTPATIENT
Start: 2024-01-17

## 2024-01-18 ENCOUNTER — TELEPHONE (OUTPATIENT)
Dept: FAMILY MEDICINE | Facility: CLINIC | Age: 29
End: 2024-01-18

## 2024-01-18 DIAGNOSIS — F90.2 ATTENTION DEFICIT HYPERACTIVITY DISORDER (ADHD), COMBINED TYPE: Primary | ICD-10-CM

## 2024-01-18 RX ORDER — DEXTROAMPHETAMINE SACCHARATE, AMPHETAMINE ASPARTATE, DEXTROAMPHETAMINE SULFATE AND AMPHETAMINE SULFATE 2.5; 2.5; 2.5; 2.5 MG/1; MG/1; MG/1; MG/1
10 TABLET ORAL DAILY
Qty: 30 TABLET | Refills: 0 | Status: SHIPPED | OUTPATIENT
Start: 2024-01-18 | End: 2024-02-17

## 2024-01-18 RX ORDER — DEXTROAMPHETAMINE SACCHARATE, AMPHETAMINE ASPARTATE, DEXTROAMPHETAMINE SULFATE AND AMPHETAMINE SULFATE 2.5; 2.5; 2.5; 2.5 MG/1; MG/1; MG/1; MG/1
10 TABLET ORAL DAILY
Qty: 30 TABLET | Refills: 0 | Status: SHIPPED | OUTPATIENT
Start: 2024-03-20 | End: 2024-04-10

## 2024-01-18 RX ORDER — DEXTROAMPHETAMINE SACCHARATE, AMPHETAMINE ASPARTATE, DEXTROAMPHETAMINE SULFATE AND AMPHETAMINE SULFATE 2.5; 2.5; 2.5; 2.5 MG/1; MG/1; MG/1; MG/1
10 TABLET ORAL DAILY
Qty: 30 TABLET | Refills: 0 | Status: SHIPPED | OUTPATIENT
Start: 2024-02-18 | End: 2024-03-19

## 2024-01-18 NOTE — TELEPHONE ENCOUNTER
Patient calling and states his Adderall 10 mg did not get sent and only the Adderall 30 mg XR got sent.  I see it as discontinued therapy complete upon review.  Please advise.  Malathi Lowe RN    01/17/24 2205 Discontinue Enio Ge MD Reason:Therapy completed (No AVS)     Outpatient Medication Detail     Disp Refills Start End MARTIN   amphetamine-dextroamphetamine (ADDERALL) 10 MG tablet (Discontinued) 30 tablet 0 12/26/2023 1/17/2024 No   Sig - Route: Take 1 tablet (10 mg) by mouth daily - Oral   Sent to pharmacy as: Amphetamine-Dextroamphetamine 10 MG Oral Tablet (Adderall)   Class: E-Prescribe   Earliest Fill Date: 12/26/2023   Reason for Discontinue: Therapy completed (No AVS)   Order: [id number]   E-Prescribing Status: Receipt confirmed by pharmacy (12/26/2023 11:44 AM CST)   E-Cancel Status: Request sent; waiting for response (1/17/2024 10:05 PM CST)         Printout Tracking    External Result Report     Pharmacy    [facility] [id number] [facility]     This Order Has Been Discontinued    Order Status Reason By On   Discontinued Therapy completed (No AVS) Enio Ge MD 1/17/24 2205

## 2024-04-07 ENCOUNTER — MYC REFILL (OUTPATIENT)
Dept: FAMILY MEDICINE | Facility: CLINIC | Age: 29
End: 2024-04-07

## 2024-04-07 DIAGNOSIS — F90.2 ATTENTION DEFICIT HYPERACTIVITY DISORDER (ADHD), COMBINED TYPE: ICD-10-CM

## 2024-04-08 RX ORDER — DEXTROAMPHETAMINE SACCHARATE, AMPHETAMINE ASPARTATE MONOHYDRATE, DEXTROAMPHETAMINE SULFATE AND AMPHETAMINE SULFATE 7.5; 7.5; 7.5; 7.5 MG/1; MG/1; MG/1; MG/1
30 CAPSULE, EXTENDED RELEASE ORAL DAILY
Qty: 30 CAPSULE | Refills: 0 | OUTPATIENT
Start: 2024-04-08

## 2024-04-08 RX ORDER — DEXTROAMPHETAMINE SACCHARATE, AMPHETAMINE ASPARTATE, DEXTROAMPHETAMINE SULFATE AND AMPHETAMINE SULFATE 2.5; 2.5; 2.5; 2.5 MG/1; MG/1; MG/1; MG/1
10 TABLET ORAL DAILY
Qty: 30 TABLET | Refills: 0 | OUTPATIENT
Start: 2024-04-08

## 2024-04-08 NOTE — TELEPHONE ENCOUNTER
Please call patient. Refills denied. He is overdue for a visit and no showed his last appointment. Needs visit to establish care and can get refills at that visit.    Armand Dunlap PA-C on 4/8/2024 at 8:59 AM (covering for Kaylene Geller)

## 2024-04-10 NOTE — TELEPHONE ENCOUNTER
Pt came into clinic to get scheduled. Pt is now scheduled at soonest available appt for 5/8/2024 to [facility] care with Dr Tamera Pepe. Pt is requesting enough meds to get to that appt as he is out and understands the situation of no showing previous appts with the impact on medication and the need to keep this appt. Pt is requesting this as he is struggling without it.    Routing to [facility] as previous provider was located there.    Maggi Oliva

## 2024-04-11 RX ORDER — DEXTROAMPHETAMINE SACCHARATE, AMPHETAMINE ASPARTATE MONOHYDRATE, DEXTROAMPHETAMINE SULFATE AND AMPHETAMINE SULFATE 7.5; 7.5; 7.5; 7.5 MG/1; MG/1; MG/1; MG/1
30 CAPSULE, EXTENDED RELEASE ORAL DAILY
Qty: 28 CAPSULE | Refills: 0 | Status: SHIPPED | OUTPATIENT
Start: 2024-04-12 | End: 2024-08-20

## 2024-04-11 RX ORDER — DEXTROAMPHETAMINE SACCHARATE, AMPHETAMINE ASPARTATE, DEXTROAMPHETAMINE SULFATE AND AMPHETAMINE SULFATE 2.5; 2.5; 2.5; 2.5 MG/1; MG/1; MG/1; MG/1
10 TABLET ORAL DAILY
Qty: 21 TABLET | Refills: 0 | Status: SHIPPED | OUTPATIENT
Start: 2024-04-17 | End: 2024-08-20

## 2024-04-11 NOTE — TELEPHONE ENCOUNTER
Sent limited fill to get to upcoming appointment.    Armand Dunlap PA-C on 4/11/2024 at 9:50 AM (covering for Kaylene Geller)

## 2024-05-08 ENCOUNTER — OFFICE VISIT (OUTPATIENT)
Dept: FAMILY MEDICINE | Facility: CLINIC | Age: 29
End: 2024-05-08

## 2024-05-08 VITALS
HEART RATE: 63 BPM | OXYGEN SATURATION: 99 % | BODY MASS INDEX: 31.22 KG/M2 | TEMPERATURE: 97.9 F | HEIGHT: 71 IN | SYSTOLIC BLOOD PRESSURE: 131 MMHG | DIASTOLIC BLOOD PRESSURE: 78 MMHG | RESPIRATION RATE: 16 BRPM | WEIGHT: 223 LBS

## 2024-05-08 DIAGNOSIS — F90.2 ATTENTION DEFICIT HYPERACTIVITY DISORDER (ADHD), COMBINED TYPE: Primary | ICD-10-CM

## 2024-05-08 PROCEDURE — 99213 OFFICE O/P EST LOW 20 MIN: CPT | Performed by: GENERAL PRACTICE

## 2024-05-08 RX ORDER — DEXTROAMPHETAMINE SACCHARATE, AMPHETAMINE ASPARTATE MONOHYDRATE, DEXTROAMPHETAMINE SULFATE AND AMPHETAMINE SULFATE 7.5; 7.5; 7.5; 7.5 MG/1; MG/1; MG/1; MG/1
30 CAPSULE, EXTENDED RELEASE ORAL EVERY MORNING
Qty: 30 CAPSULE | Refills: 0 | Status: SHIPPED | OUTPATIENT
Start: 2024-07-09 | End: 2024-07-26

## 2024-05-08 RX ORDER — DEXTROAMPHETAMINE SACCHARATE, AMPHETAMINE ASPARTATE, DEXTROAMPHETAMINE SULFATE AND AMPHETAMINE SULFATE 2.5; 2.5; 2.5; 2.5 MG/1; MG/1; MG/1; MG/1
10 TABLET ORAL DAILY
Qty: 30 TABLET | Refills: 0 | Status: CANCELLED | OUTPATIENT
Start: 2024-05-08

## 2024-05-08 RX ORDER — DEXTROAMPHETAMINE SACCHARATE, AMPHETAMINE ASPARTATE MONOHYDRATE, DEXTROAMPHETAMINE SULFATE AND AMPHETAMINE SULFATE 7.5; 7.5; 7.5; 7.5 MG/1; MG/1; MG/1; MG/1
30 CAPSULE, EXTENDED RELEASE ORAL EVERY MORNING
Qty: 30 CAPSULE | Refills: 0 | Status: SHIPPED | OUTPATIENT
Start: 2024-05-08 | End: 2024-06-07

## 2024-05-08 RX ORDER — DEXTROAMPHETAMINE SACCHARATE, AMPHETAMINE ASPARTATE, DEXTROAMPHETAMINE SULFATE AND AMPHETAMINE SULFATE 2.5; 2.5; 2.5; 2.5 MG/1; MG/1; MG/1; MG/1
10 TABLET ORAL DAILY
Qty: 30 TABLET | Refills: 0 | Status: SHIPPED | OUTPATIENT
Start: 2024-06-08 | End: 2024-07-08

## 2024-05-08 RX ORDER — DEXTROAMPHETAMINE SACCHARATE, AMPHETAMINE ASPARTATE, DEXTROAMPHETAMINE SULFATE AND AMPHETAMINE SULFATE 2.5; 2.5; 2.5; 2.5 MG/1; MG/1; MG/1; MG/1
10 TABLET ORAL DAILY
Qty: 30 TABLET | Refills: 0 | Status: SHIPPED | OUTPATIENT
Start: 2024-07-09 | End: 2024-07-26

## 2024-05-08 RX ORDER — DEXTROAMPHETAMINE SACCHARATE, AMPHETAMINE ASPARTATE MONOHYDRATE, DEXTROAMPHETAMINE SULFATE AND AMPHETAMINE SULFATE 7.5; 7.5; 7.5; 7.5 MG/1; MG/1; MG/1; MG/1
30 CAPSULE, EXTENDED RELEASE ORAL EVERY MORNING
Qty: 30 CAPSULE | Refills: 0 | Status: CANCELLED | OUTPATIENT
Start: 2024-05-08

## 2024-05-08 RX ORDER — DEXTROAMPHETAMINE SACCHARATE, AMPHETAMINE ASPARTATE, DEXTROAMPHETAMINE SULFATE AND AMPHETAMINE SULFATE 2.5; 2.5; 2.5; 2.5 MG/1; MG/1; MG/1; MG/1
10 TABLET ORAL DAILY
Qty: 30 TABLET | Refills: 0 | Status: SHIPPED | OUTPATIENT
Start: 2024-05-08 | End: 2024-06-07

## 2024-05-08 RX ORDER — DEXTROAMPHETAMINE SACCHARATE, AMPHETAMINE ASPARTATE MONOHYDRATE, DEXTROAMPHETAMINE SULFATE AND AMPHETAMINE SULFATE 7.5; 7.5; 7.5; 7.5 MG/1; MG/1; MG/1; MG/1
30 CAPSULE, EXTENDED RELEASE ORAL EVERY MORNING
Qty: 30 CAPSULE | Refills: 0 | Status: SHIPPED | OUTPATIENT
Start: 2024-06-08 | End: 2024-07-08

## 2024-05-08 ASSESSMENT — PAIN SCALES - GENERAL: PAINLEVEL: NO PAIN (0)

## 2024-05-08 NOTE — PROGRESS NOTES
"  Assessment & Plan     Attention deficit hyperactivity disorder (ADHD), combined type  Stable  Virtual follow-up in 6 months  - Urine Drug Screen Clinic; Future  - amphetamine-dextroamphetamine (ADDERALL XR) 30 MG 24 hr capsule; Take 1 capsule (30 mg) by mouth every morning for 30 days  - amphetamine-dextroamphetamine (ADDERALL XR) 30 MG 24 hr capsule; Take 1 capsule (30 mg) by mouth every morning for 30 days  - amphetamine-dextroamphetamine (ADDERALL XR) 30 MG 24 hr capsule; Take 1 capsule (30 mg) by mouth every morning for 30 days  - amphetamine-dextroamphetamine (ADDERALL) 10 MG tablet; Take 1 tablet (10 mg) by mouth daily for 30 days  - amphetamine-dextroamphetamine (ADDERALL) 10 MG tablet; Take 1 tablet (10 mg) by mouth daily for 30 days  - amphetamine-dextroamphetamine (ADDERALL) 10 MG tablet; Take 1 tablet (10 mg) by mouth daily for 30 days            BMI  Estimated body mass index is 31.1 kg/m  as calculated from the following:    Height as of this encounter: 1.803 m (5' 11\").    Weight as of this encounter: 101.2 kg (223 lb).   Weight management plan: Discussed healthy diet and exercise guidelines          Pilo Mcclure is a 28 year old, presenting for the following health issues:  Establish Care, Recheck Medication, and Attention Deficit Disorder        5/8/2024     7:12 AM   Additional Questions   Roomed by Julia         5/8/2024     7:12 AM   Patient Reported Additional Medications   Patient reports taking the following new medications none       Establish Care    Has been on Adderall since 2017, did have a lower dose before and did not work.     Refill    History of Present Illness       Reason for visit:  Meet my new doctor, med check and general check up    He eats 2-3 servings of fruits and vegetables daily.He consumes 2 sweetened beverage(s) daily.He exercises with enough effort to increase his heart rate 30 to 60 minutes per day.  He exercises with enough effort to increase his heart rate " "4 days per week.   He is taking medications regularly.         Medication Followup of Adderall  Taking Medication as prescribed: yes  Side Effects:  none  Medication Helping Symptoms:  yes          Review of Systems  Constitutional, HEENT, cardiovascular, pulmonary, gi and gu systems are negative, except as otherwise noted.      Objective    /78 (BP Location: Right arm, Patient Position: Sitting, Cuff Size: Adult Regular)   Pulse 63   Temp 97.9  F (36.6  C) (Oral)   Resp 16   Ht 1.803 m (5' 11\")   Wt 101.2 kg (223 lb)   SpO2 99%   BMI 31.10 kg/m    Body mass index is 31.1 kg/m .  Physical Exam  Constitutional:       Appearance: Normal appearance.   Eyes:      Extraocular Movements: Extraocular movements intact.   Cardiovascular:      Rate and Rhythm: Normal rate and regular rhythm.   Pulmonary:      Effort: Pulmonary effort is normal.      Breath sounds: Normal breath sounds.   Abdominal:      Palpations: Abdomen is soft.   Musculoskeletal:         General: Normal range of motion.      Cervical back: Normal range of motion.   Skin:     General: Skin is warm.   Neurological:      General: No focal deficit present.      Mental Status: He is alert and oriented to person, place, and time. Mental status is at baseline.   Psychiatric:         Mood and Affect: Mood normal.         Behavior: Behavior normal.         Thought Content: Thought content normal.         Judgment: Judgment normal.                    Signed Electronically by: Tamera Pepe MD    "

## 2024-06-27 ENCOUNTER — TELEPHONE (OUTPATIENT)
Dept: FAMILY MEDICINE | Facility: CLINIC | Age: 29
End: 2024-06-27

## 2024-06-27 NOTE — TELEPHONE ENCOUNTER
Called [facility], spoke to Pharmacist Ankur, advised of early refill approval below from Dr Tamera Pepe.  Ankur is not sure if insurance will cover it, he will run it through and let pt know.  If insurance not covering it, discussed option of sending new script to where pt will be in July ( and then cancelling July 9 script).    Drea Hdez RN, BSN  [facility]

## 2024-06-27 NOTE — TELEPHONE ENCOUNTER
Medication Question or Refill    Contacts       Contact Date/Time Type Contact Phone/Fax    06/27/2024 08:30 AM CDT In Person (Incoming) Ren Lawton (Self)             What medication are you calling about (include dose and sig)?:   amphetamine-dextroamphetamine (ADDERALL) 10 MG tablet   amphetamine-dextroamphetamine (ADDERALL XR) 30 MG 24 hr capsule        Preferred Pharmacy:   [facility] [address] [place] [address] [facility]  [address] [facility] [address]  Phone: [phone] Fax: [phone]        Controlled Substance Agreement on file:   CSA -- Patient Level:     [Media Unavailable] Controlled Substance Agreement - Non - Opioid - Scan on 11/5/2021  9:00 AM: NON-OPIOID CONTROLLED SUBSTANCE AGREEMENT       Who prescribed the medication?: Dr. Philip     Do you need a refill? Yes    Could we send this information to you in [facility] or would you prefer to receive a phone call?:   Patient would prefer a phone call   Okay to leave a detailed message?: Yes at Cell number on file:    Telephone Information:   Mobile [phone]       *Patient is leaving to go out of town tomorrow 6/28/24 and will be out of town until 7/12/24. Patient is requesting early refill due to being out of town at time of scheduled refill.*    Routing to provider to advise.     Brandy Kulkarni  Lead   [facility]

## 2024-07-26 ENCOUNTER — MYC REFILL (OUTPATIENT)
Dept: FAMILY MEDICINE | Facility: CLINIC | Age: 29
End: 2024-07-26

## 2024-07-26 DIAGNOSIS — F90.2 ATTENTION DEFICIT HYPERACTIVITY DISORDER (ADHD), COMBINED TYPE: ICD-10-CM

## 2024-07-26 RX ORDER — DEXTROAMPHETAMINE SACCHARATE, AMPHETAMINE ASPARTATE, DEXTROAMPHETAMINE SULFATE AND AMPHETAMINE SULFATE 7.5; 7.5; 7.5; 7.5 MG/1; MG/1; MG/1; MG/1
30 TABLET ORAL DAILY
Qty: 30 TABLET | Refills: 0 | Status: SHIPPED | OUTPATIENT
Start: 2024-10-04 | End: 2024-08-20

## 2024-07-26 RX ORDER — DEXTROAMPHETAMINE SACCHARATE, AMPHETAMINE ASPARTATE, DEXTROAMPHETAMINE SULFATE AND AMPHETAMINE SULFATE 7.5; 7.5; 7.5; 7.5 MG/1; MG/1; MG/1; MG/1
30 TABLET ORAL DAILY
Qty: 30 TABLET | Refills: 0 | Status: SHIPPED | OUTPATIENT
Start: 2024-09-06 | End: 2024-08-20

## 2024-07-26 RX ORDER — DEXTROAMPHETAMINE SACCHARATE, AMPHETAMINE ASPARTATE, DEXTROAMPHETAMINE SULFATE AND AMPHETAMINE SULFATE 2.5; 2.5; 2.5; 2.5 MG/1; MG/1; MG/1; MG/1
10 TABLET ORAL DAILY
Qty: 30 TABLET | Refills: 0 | Status: SHIPPED | OUTPATIENT
Start: 2024-10-04 | End: 2024-08-20

## 2024-07-26 RX ORDER — DEXTROAMPHETAMINE SACCHARATE, AMPHETAMINE ASPARTATE, DEXTROAMPHETAMINE SULFATE AND AMPHETAMINE SULFATE 2.5; 2.5; 2.5; 2.5 MG/1; MG/1; MG/1; MG/1
10 TABLET ORAL DAILY
Qty: 30 TABLET | Refills: 0 | Status: SHIPPED | OUTPATIENT
Start: 2024-09-06 | End: 2024-08-20

## 2024-07-26 RX ORDER — DEXTROAMPHETAMINE SACCHARATE, AMPHETAMINE ASPARTATE MONOHYDRATE, DEXTROAMPHETAMINE SULFATE AND AMPHETAMINE SULFATE 7.5; 7.5; 7.5; 7.5 MG/1; MG/1; MG/1; MG/1
30 CAPSULE, EXTENDED RELEASE ORAL EVERY MORNING
Qty: 30 CAPSULE | Refills: 0 | Status: SHIPPED | OUTPATIENT
Start: 2024-07-26 | End: 2024-08-20

## 2024-07-26 RX ORDER — DEXTROAMPHETAMINE SACCHARATE, AMPHETAMINE ASPARTATE, DEXTROAMPHETAMINE SULFATE AND AMPHETAMINE SULFATE 2.5; 2.5; 2.5; 2.5 MG/1; MG/1; MG/1; MG/1
10 TABLET ORAL DAILY
Qty: 30 TABLET | Refills: 0 | Status: SHIPPED | OUTPATIENT
Start: 2024-07-26 | End: 2024-08-20

## 2024-08-20 ENCOUNTER — TELEPHONE (OUTPATIENT)
Dept: FAMILY MEDICINE | Facility: CLINIC | Age: 29
End: 2024-08-20

## 2024-08-20 ENCOUNTER — MYC REFILL (OUTPATIENT)
Dept: FAMILY MEDICINE | Facility: CLINIC | Age: 29
End: 2024-08-20

## 2024-08-20 DIAGNOSIS — F90.2 ATTENTION DEFICIT HYPERACTIVITY DISORDER (ADHD), COMBINED TYPE: ICD-10-CM

## 2024-08-20 RX ORDER — DEXTROAMPHETAMINE SACCHARATE, AMPHETAMINE ASPARTATE MONOHYDRATE, DEXTROAMPHETAMINE SULFATE AND AMPHETAMINE SULFATE 7.5; 7.5; 7.5; 7.5 MG/1; MG/1; MG/1; MG/1
30 CAPSULE, EXTENDED RELEASE ORAL EVERY MORNING
Qty: 30 CAPSULE | Refills: 0 | Status: CANCELLED | OUTPATIENT
Start: 2024-08-20

## 2024-08-20 RX ORDER — DEXTROAMPHETAMINE SACCHARATE, AMPHETAMINE ASPARTATE, DEXTROAMPHETAMINE SULFATE AND AMPHETAMINE SULFATE 2.5; 2.5; 2.5; 2.5 MG/1; MG/1; MG/1; MG/1
10 TABLET ORAL DAILY
Qty: 30 TABLET | Refills: 0 | Status: SHIPPED | OUTPATIENT
Start: 2024-09-26

## 2024-08-20 RX ORDER — DEXTROAMPHETAMINE SACCHARATE, AMPHETAMINE ASPARTATE MONOHYDRATE, DEXTROAMPHETAMINE SULFATE AND AMPHETAMINE SULFATE 7.5; 7.5; 7.5; 7.5 MG/1; MG/1; MG/1; MG/1
30 CAPSULE, EXTENDED RELEASE ORAL EVERY MORNING
Qty: 30 CAPSULE | Refills: 0 | Status: SHIPPED | OUTPATIENT
Start: 2024-09-26

## 2024-08-20 RX ORDER — DEXTROAMPHETAMINE SACCHARATE, AMPHETAMINE ASPARTATE, DEXTROAMPHETAMINE SULFATE AND AMPHETAMINE SULFATE 2.5; 2.5; 2.5; 2.5 MG/1; MG/1; MG/1; MG/1
10 TABLET ORAL DAILY
Qty: 30 TABLET | Refills: 0 | Status: SHIPPED | OUTPATIENT
Start: 2024-10-25

## 2024-08-20 RX ORDER — DEXTROAMPHETAMINE SACCHARATE, AMPHETAMINE ASPARTATE MONOHYDRATE, DEXTROAMPHETAMINE SULFATE AND AMPHETAMINE SULFATE 7.5; 7.5; 7.5; 7.5 MG/1; MG/1; MG/1; MG/1
30 CAPSULE, EXTENDED RELEASE ORAL DAILY
Qty: 30 CAPSULE | Refills: 0 | Status: SHIPPED | OUTPATIENT
Start: 2024-08-26 | End: 2024-09-24

## 2024-08-20 RX ORDER — DEXTROAMPHETAMINE SACCHARATE, AMPHETAMINE ASPARTATE MONOHYDRATE, DEXTROAMPHETAMINE SULFATE AND AMPHETAMINE SULFATE 7.5; 7.5; 7.5; 7.5 MG/1; MG/1; MG/1; MG/1
30 CAPSULE, EXTENDED RELEASE ORAL EVERY MORNING
Qty: 30 CAPSULE | Refills: 0 | OUTPATIENT
Start: 2024-08-20

## 2024-08-20 RX ORDER — DEXTROAMPHETAMINE SACCHARATE, AMPHETAMINE ASPARTATE, DEXTROAMPHETAMINE SULFATE AND AMPHETAMINE SULFATE 2.5; 2.5; 2.5; 2.5 MG/1; MG/1; MG/1; MG/1
10 TABLET ORAL DAILY
Qty: 30 TABLET | Refills: 0 | Status: SHIPPED | OUTPATIENT
Start: 2024-08-26 | End: 2024-09-24

## 2024-08-20 RX ORDER — DEXTROAMPHETAMINE SACCHARATE, AMPHETAMINE ASPARTATE, DEXTROAMPHETAMINE SULFATE AND AMPHETAMINE SULFATE 7.5; 7.5; 7.5; 7.5 MG/1; MG/1; MG/1; MG/1
30 TABLET ORAL DAILY
Qty: 30 TABLET | Refills: 0 | Status: SHIPPED | OUTPATIENT
Start: 2024-10-25

## 2024-08-20 RX ORDER — DEXTROAMPHETAMINE SACCHARATE, AMPHETAMINE ASPARTATE, DEXTROAMPHETAMINE SULFATE AND AMPHETAMINE SULFATE 2.5; 2.5; 2.5; 2.5 MG/1; MG/1; MG/1; MG/1
10 TABLET ORAL DAILY
Qty: 30 TABLET | Refills: 0 | Status: CANCELLED | OUTPATIENT
Start: 2024-08-20

## 2024-08-20 RX ORDER — DEXTROAMPHETAMINE SACCHARATE, AMPHETAMINE ASPARTATE, DEXTROAMPHETAMINE SULFATE AND AMPHETAMINE SULFATE 2.5; 2.5; 2.5; 2.5 MG/1; MG/1; MG/1; MG/1
10 TABLET ORAL DAILY
Qty: 21 TABLET | Refills: 0 | OUTPATIENT
Start: 2024-08-20

## 2024-08-20 NOTE — TELEPHONE ENCOUNTER
- Patient stops by [facility] stating the wrong Rx for amphetamine-dextroamphetamine was sent to his pharmacy along with the dates of fill being incorrect.      - Patient will be due for a refill of his adderall soon but there is no active August refill on file.  Pharmacy says the scripts sent to them are for September and October with fill dates being the beginning of the month when they actually need to be filled around the 26th of each month.    - Patient needs both Rx's resent to his pharmacy with corrections.     Pharmacy    [id number] - [place], [place] - [address] [address]       Rx sent for July is correct- see below:  amphetamine-dextroamphetamine (ADDERALL XR) 30 MG 24 hr capsule 30 capsule 0 7/26/2024 -- No   Sig - Route: Take 1 capsule (30 mg) by mouth every morning - Oral     amphetamine-dextroamphetamine (ADDERALL) 10 MG tablet 30 tablet 0 7/26/2024 -- No   Sig - Route: Take 1 tablet (10 mg) by mouth daily - Oral       Incorrect refill:  amphetamine-dextroamphetamine (ADDERALL) 30 MG tablet 30 tablet 0 9/6/2024 -- No   Sig - Route: Take 1 tablet (30 mg) by mouth daily - Oral     amphetamine-dextroamphetamine (ADDERALL) 10 MG tablet 30 tablet 0 9/6/2024 -- No   Sig - Route: Take 1 tablet (10 mg) by mouth daily - Oral           Genesis MARIA, - [facility]  [facility]

## 2024-08-23 NOTE — TELEPHONE ENCOUNTER
Pt stating Prescriptions are on  hold and the start date is 8/26/2024. That's when they will release them. He says he is going out of town today and he needs these refilled ASAP.       Irma Schroeder Patient Representative

## 2024-08-23 NOTE — TELEPHONE ENCOUNTER
Called pharmacy and informed ok to fill today per provider.    Megan Cabrera RN on 8/23/2024 at 2:44 PM

## 2024-09-23 ENCOUNTER — TELEPHONE (OUTPATIENT)
Dept: FAMILY MEDICINE | Facility: CLINIC | Age: 29
End: 2024-09-23

## 2024-09-23 NOTE — TELEPHONE ENCOUNTER
PT came to clinic, cannot fill Amhetamine-dextroamphetamine, 30mg and 10mg prior to 9/26/2024(the day he is supposed to start it) PT is requesting the refill on 9/25/2024.  PT is asking that all additional refills be available on a couple days earlier of each month.  Any questions please reach out to PT.    Nhi Womack Patient Rep.

## 2024-09-24 ENCOUNTER — MYC REFILL (OUTPATIENT)
Dept: FAMILY MEDICINE | Facility: CLINIC | Age: 29
End: 2024-09-24

## 2024-09-24 DIAGNOSIS — F90.2 ATTENTION DEFICIT HYPERACTIVITY DISORDER (ADHD), COMBINED TYPE: ICD-10-CM

## 2024-09-24 RX ORDER — DEXTROAMPHETAMINE SACCHARATE, AMPHETAMINE ASPARTATE MONOHYDRATE, DEXTROAMPHETAMINE SULFATE AND AMPHETAMINE SULFATE 7.5; 7.5; 7.5; 7.5 MG/1; MG/1; MG/1; MG/1
30 CAPSULE, EXTENDED RELEASE ORAL DAILY
Qty: 30 CAPSULE | Refills: 0 | Status: CANCELLED | OUTPATIENT
Start: 2024-09-24

## 2024-09-24 RX ORDER — DEXTROAMPHETAMINE SACCHARATE, AMPHETAMINE ASPARTATE, DEXTROAMPHETAMINE SULFATE AND AMPHETAMINE SULFATE 2.5; 2.5; 2.5; 2.5 MG/1; MG/1; MG/1; MG/1
10 TABLET ORAL DAILY
Qty: 30 TABLET | Refills: 0 | Status: SHIPPED | OUTPATIENT
Start: 2024-10-25

## 2024-09-24 RX ORDER — DEXTROAMPHETAMINE SACCHARATE, AMPHETAMINE ASPARTATE MONOHYDRATE, DEXTROAMPHETAMINE SULFATE AND AMPHETAMINE SULFATE 7.5; 7.5; 7.5; 7.5 MG/1; MG/1; MG/1; MG/1
30 CAPSULE, EXTENDED RELEASE ORAL DAILY
Qty: 30 CAPSULE | Refills: 0 | Status: SHIPPED | OUTPATIENT
Start: 2024-09-25

## 2024-09-24 RX ORDER — DEXTROAMPHETAMINE SACCHARATE, AMPHETAMINE ASPARTATE, DEXTROAMPHETAMINE SULFATE AND AMPHETAMINE SULFATE 2.5; 2.5; 2.5; 2.5 MG/1; MG/1; MG/1; MG/1
10 TABLET ORAL DAILY
Qty: 30 TABLET | Refills: 0 | Status: CANCELLED | OUTPATIENT
Start: 2024-09-24

## 2024-09-24 NOTE — TELEPHONE ENCOUNTER
Patient is sending [email or web address] message, requesting early refill for adderall medication, please review and advise.     James BOB RN 9/24/2024 at 12:48 PM

## 2024-09-24 NOTE — TELEPHONE ENCOUNTER
Pharmacy is asking if they can get a verbal to fill Adderall 10mg tomorrow instead of 9/26/24. Pt is to start medication on 9/26 so is asking to  tomorrow.    Megan Lantigua RN on 9/24/2024 at 2:50 PM

## 2024-09-24 NOTE — TELEPHONE ENCOUNTER
Routing to . Please see Nhi's note. Is it possible to switch the start date of Amhetamine-dextroamphetamine 30 mg and 10mg to the day before so pt can receive a day early?     ALEX CarlisleN, RN     [facility]    09/24/2024 at 10:30 AM

## 2024-10-24 ENCOUNTER — TELEPHONE (OUTPATIENT)
Dept: FAMILY MEDICINE | Facility: CLINIC | Age: 29
End: 2024-10-24

## 2024-10-24 DIAGNOSIS — F90.2 ATTENTION DEFICIT HYPERACTIVITY DISORDER (ADHD), COMBINED TYPE: ICD-10-CM

## 2024-10-24 RX ORDER — DEXTROAMPHETAMINE SACCHARATE, AMPHETAMINE ASPARTATE MONOHYDRATE, DEXTROAMPHETAMINE SULFATE AND AMPHETAMINE SULFATE 7.5; 7.5; 7.5; 7.5 MG/1; MG/1; MG/1; MG/1
30 CAPSULE, EXTENDED RELEASE ORAL DAILY
Qty: 30 CAPSULE | Refills: 0 | Status: SHIPPED | OUTPATIENT
Start: 2024-10-24

## 2024-10-24 NOTE — TELEPHONE ENCOUNTER
Amphetamine-dextroamphetamine 30 mg 24 hr     Pt is stating he needs capsules NOT tablets    Please send new script, pt will be picking up meds tomorrow.     Irma Schroeder Patient Representative

## 2024-11-25 ENCOUNTER — MYC REFILL (OUTPATIENT)
Dept: FAMILY MEDICINE | Facility: CLINIC | Age: 29
End: 2024-11-25

## 2024-11-25 DIAGNOSIS — F90.2 ATTENTION DEFICIT HYPERACTIVITY DISORDER (ADHD), COMBINED TYPE: ICD-10-CM

## 2024-11-25 RX ORDER — DEXTROAMPHETAMINE SACCHARATE, AMPHETAMINE ASPARTATE, DEXTROAMPHETAMINE SULFATE AND AMPHETAMINE SULFATE 2.5; 2.5; 2.5; 2.5 MG/1; MG/1; MG/1; MG/1
10 TABLET ORAL DAILY
Qty: 30 TABLET | Refills: 0 | Status: SHIPPED | OUTPATIENT
Start: 2024-11-25

## 2024-11-25 RX ORDER — DEXTROAMPHETAMINE SACCHARATE, AMPHETAMINE ASPARTATE MONOHYDRATE, DEXTROAMPHETAMINE SULFATE AND AMPHETAMINE SULFATE 7.5; 7.5; 7.5; 7.5 MG/1; MG/1; MG/1; MG/1
30 CAPSULE, EXTENDED RELEASE ORAL DAILY
Qty: 30 CAPSULE | Refills: 0 | Status: SHIPPED | OUTPATIENT
Start: 2024-11-25

## 2024-12-23 ENCOUNTER — MYC REFILL (OUTPATIENT)
Dept: FAMILY MEDICINE | Facility: CLINIC | Age: 29
End: 2024-12-23

## 2024-12-23 DIAGNOSIS — F90.2 ATTENTION DEFICIT HYPERACTIVITY DISORDER (ADHD), COMBINED TYPE: ICD-10-CM

## 2024-12-24 RX ORDER — DEXTROAMPHETAMINE SACCHARATE, AMPHETAMINE ASPARTATE, DEXTROAMPHETAMINE SULFATE AND AMPHETAMINE SULFATE 2.5; 2.5; 2.5; 2.5 MG/1; MG/1; MG/1; MG/1
10 TABLET ORAL DAILY
Qty: 30 TABLET | Refills: 0 | Status: SHIPPED | OUTPATIENT
Start: 2024-12-24

## 2024-12-24 RX ORDER — DEXTROAMPHETAMINE SACCHARATE, AMPHETAMINE ASPARTATE MONOHYDRATE, DEXTROAMPHETAMINE SULFATE AND AMPHETAMINE SULFATE 7.5; 7.5; 7.5; 7.5 MG/1; MG/1; MG/1; MG/1
30 CAPSULE, EXTENDED RELEASE ORAL DAILY
Qty: 30 CAPSULE | Refills: 0 | Status: SHIPPED | OUTPATIENT
Start: 2024-12-24

## 2024-12-26 NOTE — TELEPHONE ENCOUNTER
[facility] message requesting a call back for an appt. Two more attempts will be made.     Kaylene [facility]   [facility]

## 2025-01-22 ENCOUNTER — MYC REFILL (OUTPATIENT)
Dept: FAMILY MEDICINE | Facility: CLINIC | Age: 30
End: 2025-01-22

## 2025-01-22 DIAGNOSIS — F90.2 ATTENTION DEFICIT HYPERACTIVITY DISORDER (ADHD), COMBINED TYPE: ICD-10-CM

## 2025-01-22 RX ORDER — DEXTROAMPHETAMINE SACCHARATE, AMPHETAMINE ASPARTATE MONOHYDRATE, DEXTROAMPHETAMINE SULFATE AND AMPHETAMINE SULFATE 7.5; 7.5; 7.5; 7.5 MG/1; MG/1; MG/1; MG/1
30 CAPSULE, EXTENDED RELEASE ORAL DAILY
Qty: 30 CAPSULE | Refills: 0 | Status: SHIPPED | OUTPATIENT
Start: 2025-01-22

## 2025-01-22 RX ORDER — DEXTROAMPHETAMINE SACCHARATE, AMPHETAMINE ASPARTATE, DEXTROAMPHETAMINE SULFATE AND AMPHETAMINE SULFATE 2.5; 2.5; 2.5; 2.5 MG/1; MG/1; MG/1; MG/1
10 TABLET ORAL DAILY
Qty: 30 TABLET | Refills: 0 | Status: SHIPPED | OUTPATIENT
Start: 2025-01-22

## 2025-04-08 ENCOUNTER — MYC REFILL (OUTPATIENT)
Dept: FAMILY MEDICINE | Facility: CLINIC | Age: 30
End: 2025-04-08

## 2025-04-08 DIAGNOSIS — F90.2 ATTENTION DEFICIT HYPERACTIVITY DISORDER (ADHD), COMBINED TYPE: ICD-10-CM

## 2025-04-08 RX ORDER — DEXTROAMPHETAMINE SACCHARATE, AMPHETAMINE ASPARTATE MONOHYDRATE, DEXTROAMPHETAMINE SULFATE AND AMPHETAMINE SULFATE 7.5; 7.5; 7.5; 7.5 MG/1; MG/1; MG/1; MG/1
30 CAPSULE, EXTENDED RELEASE ORAL DAILY
Qty: 30 CAPSULE | Refills: 0 | Status: SHIPPED | OUTPATIENT
Start: 2025-04-08

## 2025-04-08 RX ORDER — DEXTROAMPHETAMINE SACCHARATE, AMPHETAMINE ASPARTATE, DEXTROAMPHETAMINE SULFATE AND AMPHETAMINE SULFATE 2.5; 2.5; 2.5; 2.5 MG/1; MG/1; MG/1; MG/1
10 TABLET ORAL DAILY
Qty: 30 TABLET | Refills: 0 | Status: SHIPPED | OUTPATIENT
Start: 2025-04-08

## (undated) DEVICE — GLOVE PROTEXIS W/NEU-THERA 8.5  2D73TE85

## (undated) DEVICE — SPONGE SURGIFOAM 100 1974

## (undated) DEVICE — DRSG KERLIX FLUFFS X5

## (undated) DEVICE — DECANTER BAG 2002S

## (undated) DEVICE — GLOVE PROTEXIS W/NEU-THERA 7.5  2D73TE75

## (undated) DEVICE — LINEN ORTHO ACL PACK 5447

## (undated) DEVICE — SU VICRYL 0 CT-2 CR 8X18" J727D

## (undated) DEVICE — SPONGE COTTONOID 1/2X1/2" 80-1400

## (undated) DEVICE — SYR 10ML LL W/O NDL 302995

## (undated) DEVICE — GOWN XXL 9575

## (undated) DEVICE — DECANTER VIAL 2006S

## (undated) DEVICE — ESU CLEANER TIP 31142717

## (undated) DEVICE — PEN MARKING SKIN W/LABELS 31145884

## (undated) DEVICE — PACK SMALL SPINE RIDGES

## (undated) DEVICE — MIDAS REX DISSECTING TOOL  14MH30

## (undated) DEVICE — SUCTION FRAZIER 12FR W/OBTURATOR 33120

## (undated) DEVICE — DRAPE MICROSCOPE OPMI ZEISS 48X118" 306071-0000-000

## (undated) DEVICE — CUSHION INSERT LG PRONE VIEW JACKSON TABLE

## (undated) DEVICE — TUBING SUCTION MEDI-VAC SOFT 3/16"X20' N520A

## (undated) DEVICE — SU VICRYL 2-0 CT-2 CR 8X18" J726D

## (undated) DEVICE — ESU GROUND PAD ADULT W/CORD E7507

## (undated) DEVICE — RX SURGIFLO HEMOSTATIC MATRIX 8ML 2991

## (undated) DEVICE — LINEN POUCH DBL 5427

## (undated) DEVICE — SU WND CLOSURE VLOC 180 ABS 3-0 12" P-14 VLOCL0114

## (undated) DEVICE — GLOVE PROTEXIS BLUE W/NEU-THERA 8.5  2D73EB85

## (undated) RX ORDER — PROPOFOL 10 MG/ML
INJECTION, EMULSION INTRAVENOUS
Status: DISPENSED
Start: 2018-05-14

## (undated) RX ORDER — METOPROLOL TARTRATE 1 MG/ML
INJECTION, SOLUTION INTRAVENOUS
Status: DISPENSED
Start: 2018-05-14

## (undated) RX ORDER — ONDANSETRON 2 MG/ML
INJECTION INTRAMUSCULAR; INTRAVENOUS
Status: DISPENSED
Start: 2018-05-14

## (undated) RX ORDER — LIDOCAINE HYDROCHLORIDE 10 MG/ML
INJECTION, SOLUTION EPIDURAL; INFILTRATION; INTRACAUDAL; PERINEURAL
Status: DISPENSED
Start: 2018-05-14

## (undated) RX ORDER — KETOROLAC TROMETHAMINE 30 MG/ML
INJECTION, SOLUTION INTRAMUSCULAR; INTRAVENOUS
Status: DISPENSED
Start: 2018-05-14

## (undated) RX ORDER — DEXAMETHASONE SODIUM PHOSPHATE 4 MG/ML
INJECTION, SOLUTION INTRA-ARTICULAR; INTRALESIONAL; INTRAMUSCULAR; INTRAVENOUS; SOFT TISSUE
Status: DISPENSED
Start: 2018-05-14

## (undated) RX ORDER — FENTANYL CITRATE 50 UG/ML
INJECTION, SOLUTION INTRAMUSCULAR; INTRAVENOUS
Status: DISPENSED
Start: 2018-05-14

## (undated) RX ORDER — GLYCOPYRROLATE 0.2 MG/ML
INJECTION INTRAMUSCULAR; INTRAVENOUS
Status: DISPENSED
Start: 2018-05-14

## (undated) RX ORDER — ACETAMINOPHEN 500 MG
TABLET ORAL
Status: DISPENSED
Start: 2018-05-14

## (undated) RX ORDER — BUPIVACAINE HYDROCHLORIDE AND EPINEPHRINE 5; 5 MG/ML; UG/ML
INJECTION, SOLUTION EPIDURAL; INTRACAUDAL; PERINEURAL
Status: DISPENSED
Start: 2018-05-14

## (undated) RX ORDER — HYDROMORPHONE HYDROCHLORIDE 2 MG/1
TABLET ORAL
Status: DISPENSED
Start: 2018-05-14

## (undated) RX ORDER — NEOSTIGMINE METHYLSULFATE 1 MG/ML
VIAL (ML) INJECTION
Status: DISPENSED
Start: 2018-05-14

## (undated) RX ORDER — CEFAZOLIN SODIUM 2 G/100ML
INJECTION, SOLUTION INTRAVENOUS
Status: DISPENSED
Start: 2018-05-14